# Patient Record
Sex: FEMALE | Race: WHITE | Employment: OTHER | URBAN - METROPOLITAN AREA
[De-identification: names, ages, dates, MRNs, and addresses within clinical notes are randomized per-mention and may not be internally consistent; named-entity substitution may affect disease eponyms.]

---

## 2017-02-14 ENCOUNTER — OFFICE VISIT (OUTPATIENT)
Dept: INTERNAL MEDICINE CLINIC | Facility: CLINIC | Age: 42
End: 2017-02-14

## 2017-02-14 VITALS — WEIGHT: 198 LBS | BODY MASS INDEX: 33.8 KG/M2 | HEIGHT: 64 IN

## 2017-02-14 DIAGNOSIS — M54.50 MIDLINE LOW BACK PAIN WITHOUT SCIATICA, UNSPECIFIED CHRONICITY: ICD-10-CM

## 2017-02-14 DIAGNOSIS — Z00.00 PHYSICAL EXAM, ANNUAL: Primary | ICD-10-CM

## 2017-02-14 DIAGNOSIS — R06.83 SNORING: ICD-10-CM

## 2017-02-14 PROCEDURE — 99396 PREV VISIT EST AGE 40-64: CPT | Performed by: INTERNAL MEDICINE

## 2017-02-14 RX ORDER — TRAMADOL HYDROCHLORIDE 50 MG/1
50 TABLET ORAL EVERY 6 HOURS PRN
Qty: 30 TABLET | Refills: 1 | Status: SHIPPED | OUTPATIENT
Start: 2017-02-14 | End: 2018-07-18

## 2017-02-14 NOTE — PROGRESS NOTES
HPI:   Herlinda Sage is a 42year old female who presents for a complete physical exam. Symptoms: periods are regular. Patient complains of back pain. There is no immunization history on file for this patient.   Wt Readings from Last 6 Encounters:  02 History          Comment  X 3, , ?, 2009    CHOLECYSTECTOMY      Comment laser cholecystectomy    TONSILLECTOMY         &     OTHER SURGICAL HISTORY      Comment gallbladder removed      Family History   Problem Re lesions  HEENT: atraumatic, normocephalic,ears and throat are clear  EYES:PERRLA, EOMI, normal optic disk,conjunctiva are clear  NECK: supple,no adenopathy,no bruits  CHEST: no chest tenderness  BREAST: no dominant or suspicious mass  LUNGS: clear to auscu office if the back pain persists or gets worse. The patient indicates understanding of these issues and agrees to the plan. The patient is asked to return for CPX in 1 year.

## 2017-02-25 ENCOUNTER — TELEPHONE (OUTPATIENT)
Dept: INTERNAL MEDICINE CLINIC | Facility: CLINIC | Age: 42
End: 2017-02-25

## 2017-02-25 DIAGNOSIS — M54.50 ACUTE MIDLINE LOW BACK PAIN WITHOUT SCIATICA: Primary | ICD-10-CM

## 2017-07-06 ENCOUNTER — OFFICE VISIT (OUTPATIENT)
Dept: NEUROLOGY | Facility: CLINIC | Age: 42
End: 2017-07-06

## 2017-07-06 VITALS
HEART RATE: 86 BPM | SYSTOLIC BLOOD PRESSURE: 110 MMHG | OXYGEN SATURATION: 97 % | BODY MASS INDEX: 30.73 KG/M2 | DIASTOLIC BLOOD PRESSURE: 62 MMHG | WEIGHT: 180 LBS | HEIGHT: 64 IN

## 2017-07-06 DIAGNOSIS — M54.16 LUMBAR RADICULOPATHY: ICD-10-CM

## 2017-07-06 DIAGNOSIS — M51.9 LUMBAR DISC DISEASE: ICD-10-CM

## 2017-07-06 DIAGNOSIS — G89.29 CHRONIC MIDLINE LOW BACK PAIN WITHOUT SCIATICA: Primary | ICD-10-CM

## 2017-07-06 DIAGNOSIS — M48.061 LUMBAR FORAMINAL STENOSIS: ICD-10-CM

## 2017-07-06 DIAGNOSIS — M54.50 CHRONIC MIDLINE LOW BACK PAIN WITHOUT SCIATICA: Primary | ICD-10-CM

## 2017-07-06 DIAGNOSIS — M43.10 RETROLISTHESIS: ICD-10-CM

## 2017-07-06 PROCEDURE — 99244 OFF/OP CNSLTJ NEW/EST MOD 40: CPT | Performed by: PHYSICAL MEDICINE & REHABILITATION

## 2017-07-06 NOTE — PROGRESS NOTES
Low Back Pain H & P    Chief Complaint:  Patient presents with:  Low Back Pain: Dr. Abigail Duong, pt c/o chronic lower back pain, states she has severe sharp pains that go into buttocks and happens with sudden movement and happens on and off, pain is constant Diagnosis Date   • Artificial insemination    • Cholecystitis 2011    Laser cholecystectomy 2011   • Decorative tattoo    • Degenerative lumbar disc 2005    Degenerative L2 disc, Therapy   • H/O pregnancy 1988, 1989, 2003, 2009    EAB x 2-1988, 1989;  Vasile History of tanning Yes    Breast feeding No    Reaction to local anesthetic No     Social History Narrative   None on file       Review of Systems  Constitutional:   Negative for chills, fatigue, fever, but positive night sweats.   No weight gain or weight degrees Painless     Lumbar Spine Palpation:    Spinous Processes: Tender at L5 and S1   Z-joints: Tender at  left L5-S1   SIJ: Non-tender for bilateral SIJ   Piriformis Muscle: Non-tender bilateral Piriformis muscles   Greater Trochanteric Bursa: Non-tend foraminal bulging disc    3. L5-S1 grade 1 Retrolisthesis with S1 lumbarized    4. L5-S1 left mild foraminal stenosis    5. left L5-S1 radiculopathy      Plan  She will start PT on her lumbar spine at [facility].     The patient does not need any injections at [facility]

## 2017-07-06 NOTE — PATIENT INSTRUCTIONS
As of October 6th 2014, the Drug Enforcement Agency [facility]) is reclassifying all hydrocodone combination medications from Schedule III to Schedule II. This includes medications such as Norco, Vicodin, Lortab, Zohydro, and Vicoprofen.     What this means for y chart.      Plan  She will start PT on her lumbar spine at [facility]. The patient does not need any injections at this time. She will take Advil 800 mg 2 times a day for the pain. She will call me after she has done one month of the PT.   If she is not d

## 2017-10-11 ENCOUNTER — PATIENT MESSAGE (OUTPATIENT)
Dept: INTERNAL MEDICINE CLINIC | Facility: CLINIC | Age: 42
End: 2017-10-11

## 2017-10-11 NOTE — TELEPHONE ENCOUNTER
From: Blaire Solorio  To: Tamie Bourgeois MD  Sent: 10/11/2017 8:52 AM CDT  Subject: Other    Good morning Dr. Khalida Chapin,    We received a letter that you are no longer accepting our insurance :( Is there anyone you can refer within the group that patel

## 2018-03-20 ENCOUNTER — NURSE TRIAGE (OUTPATIENT)
Dept: OTHER | Age: 43
End: 2018-03-20

## 2018-03-20 ENCOUNTER — OFFICE VISIT (OUTPATIENT)
Dept: INTERNAL MEDICINE CLINIC | Facility: CLINIC | Age: 43
End: 2018-03-20

## 2018-03-20 VITALS
HEART RATE: 76 BPM | RESPIRATION RATE: 16 BRPM | TEMPERATURE: 99 F | DIASTOLIC BLOOD PRESSURE: 74 MMHG | SYSTOLIC BLOOD PRESSURE: 134 MMHG

## 2018-03-20 DIAGNOSIS — E78.5 HYPERLIPIDEMIA, UNSPECIFIED HYPERLIPIDEMIA TYPE: ICD-10-CM

## 2018-03-20 DIAGNOSIS — R05.9 COUGH: Primary | ICD-10-CM

## 2018-03-20 PROCEDURE — 99212 OFFICE O/P EST SF 10 MIN: CPT | Performed by: INTERNAL MEDICINE

## 2018-03-20 PROCEDURE — 99214 OFFICE O/P EST MOD 30 MIN: CPT | Performed by: INTERNAL MEDICINE

## 2018-03-20 RX ORDER — METHYLPREDNISOLONE 4 MG/1
TABLET ORAL
Qty: 1 KIT | Refills: 0 | Status: SHIPPED | OUTPATIENT
Start: 2018-03-20 | End: 2018-07-18

## 2018-03-20 RX ORDER — AZITHROMYCIN 250 MG/1
TABLET, FILM COATED ORAL
Qty: 6 TABLET | Refills: 0 | Status: SHIPPED | OUTPATIENT
Start: 2018-03-20 | End: 2018-07-18

## 2018-03-20 NOTE — PROGRESS NOTES
Dillon Marks is a 36year old female. HPI:   1. Cough    Has a cough the last few days that is getting worse. Cough keeps her up at night. Low grade temp with chills but no headache.     2. Pure hypercholesterolemia    Patient has been following low chol exertion  CARDIOVASCULAR: denies chest pain on exertion  GI: denies abdominal pain and denies heartburn  NEURO: denies headaches    EXAM:   /74 (BP Location: Right arm, Patient Position: Sitting, Cuff Size: adult)   Pulse 76   Temp 99 °F (37.2 °C)

## 2018-03-20 NOTE — TELEPHONE ENCOUNTER
Action Requested: Summary for Provider     []  Critical Lab, Recommendations Needed  [] Need Additional Advice  [x]   FYI    []   Need Orders  [] Need Medications Sent to Pharmacy  []  Other     SUMMARY: Pt advised to go to IC or ER.  Advised to call insura

## 2018-03-22 ENCOUNTER — NURSE TRIAGE (OUTPATIENT)
Dept: OTHER | Age: 43
End: 2018-03-22

## 2018-03-22 RX ORDER — ALBUTEROL SULFATE 90 UG/1
2 AEROSOL, METERED RESPIRATORY (INHALATION) EVERY 6 HOURS PRN
Qty: 1 INHALER | Refills: 0 | Status: SHIPPED | OUTPATIENT
Start: 2018-03-22 | End: 2018-07-18

## 2018-03-22 NOTE — TELEPHONE ENCOUNTER
Action Requested: Summary for Provider     []  Critical Lab, Recommendations Needed  [x] Need Additional Advice  []   FYI    []   Need Orders  [x] Need Medications Sent to Pharmacy  []  Other     SUMMARY: Patient calling with complaint of worsened cough, c

## 2018-04-05 ENCOUNTER — TELEPHONE (OUTPATIENT)
Dept: INTERNAL MEDICINE CLINIC | Facility: CLINIC | Age: 43
End: 2018-04-05

## 2018-04-05 NOTE — TELEPHONE ENCOUNTER
Pt was scheduled on 5/7 for a px. Per pt she was coming in for a px and 2 mo chol check-up. She states DR advised her that he would do the f/u and px in that visit. Pt wants to confirm if that's ok? If so, next availability for a px would be in July. Pt wants to be seen sooner. pls advise.  Pt can only come in on Monday's after 2:30 pm and any other day after 4 pm. Thank you    Please reply to pool: ASHISH Vazquez

## 2018-04-19 RX ORDER — ATORVASTATIN CALCIUM 40 MG/1
20 TABLET, FILM COATED ORAL DAILY
Qty: 30 TABLET | Refills: 2 | Status: SHIPPED | OUTPATIENT
Start: 2018-04-19 | End: 2018-08-20

## 2018-07-18 ENCOUNTER — OFFICE VISIT (OUTPATIENT)
Dept: OBGYN CLINIC | Facility: CLINIC | Age: 43
End: 2018-07-18

## 2018-07-18 VITALS
BODY MASS INDEX: 34 KG/M2 | DIASTOLIC BLOOD PRESSURE: 86 MMHG | HEART RATE: 82 BPM | WEIGHT: 195.19 LBS | SYSTOLIC BLOOD PRESSURE: 122 MMHG

## 2018-07-18 DIAGNOSIS — Z01.419 ENCOUNTER FOR GYNECOLOGICAL EXAMINATION WITHOUT ABNORMAL FINDING: Primary | ICD-10-CM

## 2018-07-18 DIAGNOSIS — Z12.4 SCREENING FOR MALIGNANT NEOPLASM OF CERVIX: ICD-10-CM

## 2018-07-18 DIAGNOSIS — N89.8 VAGINAL DISCHARGE: ICD-10-CM

## 2018-07-18 DIAGNOSIS — Z12.31 SCREENING MAMMOGRAM, ENCOUNTER FOR: ICD-10-CM

## 2018-07-18 PROCEDURE — 99396 PREV VISIT EST AGE 40-64: CPT | Performed by: OBSTETRICS & GYNECOLOGY

## 2018-07-18 NOTE — PROGRESS NOTES
HPI:    Patient ID: Keely Ortiz is a 39year old female. HPI    with menses q 1-2 months prior to 2018. She has no menses since April. She is experiencing hot flashes. [de-identified] year old is in [employer].  Family history significant f guarding. Genitourinary: Uterus normal. No breast discharge. There is no rash or lesion on the right labia. There is no rash or lesion on the left labia. Uterus is not enlarged and not tender. Cervix exhibits no motion tenderness and no discharge.  Right

## 2018-07-19 LAB — HPV I/H RISK 1 DNA SPEC QL NAA+PROBE: NEGATIVE

## 2018-07-20 LAB
GENITAL VAGINOSIS SCREEN: NEGATIVE
TRICHOMONAS SCREEN: NEGATIVE

## 2018-08-17 ENCOUNTER — TELEPHONE (OUTPATIENT)
Dept: INTERNAL MEDICINE CLINIC | Facility: CLINIC | Age: 43
End: 2018-08-17

## 2018-08-17 NOTE — TELEPHONE ENCOUNTER
Received fax from [facility] stating pt does not want to cut Atorvastatin 40mg tabs in 1/2. She wants Atorvastatin 20mg tabs instead of 40mg tabs.     Current Outpatient Prescriptions:   •  ATORVASTATIN 40 MG Oral Tab, TAKE 0.5 TABLETS (20 MG TOTAL) BY MOUTH IKE

## 2018-08-20 ENCOUNTER — HOSPITAL ENCOUNTER (OUTPATIENT)
Dept: MAMMOGRAPHY | Age: 43
Discharge: HOME | End: 2018-08-20
Attending: OBSTETRICS & GYNECOLOGY
Payer: COMMERCIAL

## 2018-08-20 DIAGNOSIS — Z12.31 SCREENING MAMMOGRAM, ENCOUNTER FOR: ICD-10-CM

## 2018-08-20 PROCEDURE — 77067 SCR MAMMO BI INCL CAD: CPT | Performed by: OBSTETRICS & GYNECOLOGY

## 2018-08-20 RX ORDER — ATORVASTATIN CALCIUM 20 MG/1
20 TABLET, FILM COATED ORAL NIGHTLY
Qty: 90 TABLET | Refills: 0 | Status: SHIPPED | OUTPATIENT
Start: 2018-08-20 | End: 2020-01-31

## 2018-12-07 ENCOUNTER — OFFICE VISIT (OUTPATIENT)
Dept: INTERNAL MEDICINE CLINIC | Facility: CLINIC | Age: 43
End: 2018-12-07

## 2018-12-07 VITALS
HEIGHT: 64 IN | SYSTOLIC BLOOD PRESSURE: 127 MMHG | HEART RATE: 100 BPM | WEIGHT: 193 LBS | TEMPERATURE: 101 F | DIASTOLIC BLOOD PRESSURE: 87 MMHG | RESPIRATION RATE: 16 BRPM | BODY MASS INDEX: 32.95 KG/M2

## 2018-12-07 DIAGNOSIS — J06.9 UPPER RESPIRATORY INFECTION, ACUTE: Primary | ICD-10-CM

## 2018-12-07 DIAGNOSIS — E78.00 PURE HYPERCHOLESTEROLEMIA: ICD-10-CM

## 2018-12-07 PROCEDURE — 99214 OFFICE O/P EST MOD 30 MIN: CPT | Performed by: INTERNAL MEDICINE

## 2018-12-07 PROCEDURE — 99212 OFFICE O/P EST SF 10 MIN: CPT | Performed by: INTERNAL MEDICINE

## 2018-12-07 RX ORDER — AMOXICILLIN 400 MG/5ML
400 POWDER, FOR SUSPENSION ORAL 2 TIMES DAILY
Qty: 100 ML | Refills: 0 | Status: SHIPPED | OUTPATIENT
Start: 2018-12-07 | End: 2018-12-17

## 2018-12-12 NOTE — PROGRESS NOTES
Chana Dubon is a 39year old female. HPI:   1. Upper Respirory Infection    Has a cough the last few days that is getting worse. Cough keeps her up at night. Low grade temp with chills but no headache.  Had 2 children diagnosed with strept throat 10 day headaches    EXAM:   /87   Pulse 100   Temp (!) 100.9 °F (38.3 °C)   Resp 16   Ht 5' 4\" (1.626 m)   Wt 193 lb (87.5 kg)   BMI 33.13 kg/m²   GENERAL: well developed, obese, in no apparent distress  SKIN: no rashes,no suspicious lesions  HEENT: atraum

## 2019-03-06 ENCOUNTER — LAB ENCOUNTER (OUTPATIENT)
Dept: LAB | Facility: HOSPITAL | Age: 44
End: 2019-03-06
Attending: INTERNAL MEDICINE
Payer: COMMERCIAL

## 2019-03-06 ENCOUNTER — OFFICE VISIT (OUTPATIENT)
Dept: INTERNAL MEDICINE CLINIC | Facility: CLINIC | Age: 44
End: 2019-03-06

## 2019-03-06 VITALS
BODY MASS INDEX: 33 KG/M2 | RESPIRATION RATE: 16 BRPM | HEART RATE: 100 BPM | DIASTOLIC BLOOD PRESSURE: 85 MMHG | WEIGHT: 195 LBS | SYSTOLIC BLOOD PRESSURE: 135 MMHG

## 2019-03-06 DIAGNOSIS — R10.30 LOWER ABDOMINAL PAIN: Primary | ICD-10-CM

## 2019-03-06 DIAGNOSIS — R10.30 LOWER ABDOMINAL PAIN: ICD-10-CM

## 2019-03-06 LAB
ALBUMIN SERPL-MCNC: 3.8 G/DL (ref 3.4–5)
ALBUMIN/GLOB SERPL: 1.1 {RATIO} (ref 1–2)
ALP LIVER SERPL-CCNC: 60 U/L (ref 37–98)
ALT SERPL-CCNC: 34 U/L (ref 13–56)
ANION GAP SERPL CALC-SCNC: 6 MMOL/L (ref 0–18)
AST SERPL-CCNC: 19 U/L (ref 15–37)
BASOPHILS # BLD AUTO: 0.05 X10(3) UL (ref 0–0.2)
BASOPHILS NFR BLD AUTO: 0.6 %
BILIRUB SERPL-MCNC: 0.6 MG/DL (ref 0.1–2)
BILIRUB UR QL: NEGATIVE
BUN BLD-MCNC: 11 MG/DL (ref 7–18)
BUN/CREAT SERPL: 17.5 (ref 10–20)
CALCIUM BLD-MCNC: 8.5 MG/DL (ref 8.5–10.1)
CHLORIDE SERPL-SCNC: 108 MMOL/L (ref 98–107)
CLARITY UR: CLEAR
CO2 SERPL-SCNC: 26 MMOL/L (ref 21–32)
COLOR UR: YELLOW
CREAT BLD-MCNC: 0.63 MG/DL (ref 0.55–1.02)
DEPRECATED RDW RBC AUTO: 40 FL (ref 35.1–46.3)
EOSINOPHIL # BLD AUTO: 0.12 X10(3) UL (ref 0–0.7)
EOSINOPHIL NFR BLD AUTO: 1.5 %
ERYTHROCYTE [DISTWIDTH] IN BLOOD BY AUTOMATED COUNT: 12.9 % (ref 11–15)
GLOBULIN PLAS-MCNC: 3.6 G/DL (ref 2.8–4.4)
GLUCOSE BLD-MCNC: 111 MG/DL (ref 70–99)
GLUCOSE UR-MCNC: NEGATIVE MG/DL
HCT VFR BLD AUTO: 41.8 % (ref 35–48)
HGB BLD-MCNC: 13.3 G/DL (ref 12–16)
HGB UR QL STRIP.AUTO: NEGATIVE
IMM GRANULOCYTES # BLD AUTO: 0.01 X10(3) UL (ref 0–1)
IMM GRANULOCYTES NFR BLD: 0.1 %
KETONES UR-MCNC: NEGATIVE MG/DL
LEUKOCYTE ESTERASE UR QL STRIP.AUTO: NEGATIVE
LYMPHOCYTES # BLD AUTO: 3.48 X10(3) UL (ref 1–4)
LYMPHOCYTES NFR BLD AUTO: 42.7 %
M PROTEIN MFR SERPL ELPH: 7.4 G/DL (ref 6.4–8.2)
MCH RBC QN AUTO: 27.4 PG (ref 26–34)
MCHC RBC AUTO-ENTMCNC: 31.8 G/DL (ref 31–37)
MCV RBC AUTO: 86.2 FL (ref 80–100)
MONOCYTES # BLD AUTO: 0.81 X10(3) UL (ref 0.1–1)
MONOCYTES NFR BLD AUTO: 9.9 %
NEUTROPHILS # BLD AUTO: 3.68 X10 (3) UL (ref 1.5–7.7)
NEUTROPHILS # BLD AUTO: 3.68 X10(3) UL (ref 1.5–7.7)
NEUTROPHILS NFR BLD AUTO: 45.2 %
NITRITE UR QL STRIP.AUTO: NEGATIVE
OSMOLALITY SERPL CALC.SUM OF ELEC: 290 MOSM/KG (ref 275–295)
PH UR: 6 [PH] (ref 5–8)
PLATELET # BLD AUTO: 352 10(3)UL (ref 150–450)
POTASSIUM SERPL-SCNC: 4.2 MMOL/L (ref 3.5–5.1)
PROT UR-MCNC: NEGATIVE MG/DL
RBC # BLD AUTO: 4.85 X10(6)UL (ref 3.8–5.3)
SODIUM SERPL-SCNC: 140 MMOL/L (ref 136–145)
SP GR UR STRIP: 1.01 (ref 1–1.03)
UROBILINOGEN UR STRIP-ACNC: <2
VIT C UR-MCNC: NEGATIVE MG/DL
WBC # BLD AUTO: 8.2 X10(3) UL (ref 4–11)

## 2019-03-06 PROCEDURE — 99212 OFFICE O/P EST SF 10 MIN: CPT | Performed by: INTERNAL MEDICINE

## 2019-03-06 PROCEDURE — 80053 COMPREHEN METABOLIC PANEL: CPT

## 2019-03-06 PROCEDURE — 85025 COMPLETE CBC W/AUTO DIFF WBC: CPT

## 2019-03-06 PROCEDURE — 36415 COLL VENOUS BLD VENIPUNCTURE: CPT

## 2019-03-06 PROCEDURE — 99214 OFFICE O/P EST MOD 30 MIN: CPT | Performed by: INTERNAL MEDICINE

## 2019-03-06 PROCEDURE — 81003 URINALYSIS AUTO W/O SCOPE: CPT | Performed by: INTERNAL MEDICINE

## 2019-03-07 ENCOUNTER — TELEPHONE (OUTPATIENT)
Dept: GASTROENTEROLOGY | Facility: CLINIC | Age: 44
End: 2019-03-07

## 2019-03-07 NOTE — TELEPHONE ENCOUNTER
Spoke to pt. Per pt seen by Dr. Marcio Hurtado yesterday for lower abd pain and rectal bleeding x 4days. States has not had any rectal bleeding today. Appt offered and scheduled for 3/21/19 at 1000 at [facility]. Pt agreed to appt scheduled.  Mychart mess

## 2019-03-07 NOTE — TELEPHONE ENCOUNTER
Pt states that she has been having lower abdominal pain and had some rectal bleeding that started on Monday morning. Pt saw Dr Carmel Rodriguez yesterday and was told to call to schedule w/ GI. Pt saw Dr. Devora Mayes in 2016.   She is requesting an appt sooner than fi

## 2019-03-14 NOTE — PROGRESS NOTES
Larry Das is a 39year old female. HPI:   1. Abdominal pain    Had sex on Sunday and has had some abdominal pain since then with some rectal blood coming out in rectal area. Comes with even urinating and not moving bowel.        Current Outpatient Med edema    ASSESSMENT AND PLAN:     1. Lower abdominal pain    Having some lower abdominal pain with some rectal blood seen. Not sure if related to having sex a few days ago. Appetite seems ok and having regular urination and BMs.  Will check blood work and U

## 2019-03-21 ENCOUNTER — OFFICE VISIT (OUTPATIENT)
Dept: GASTROENTEROLOGY | Facility: CLINIC | Age: 44
End: 2019-03-21

## 2019-03-21 ENCOUNTER — TELEPHONE (OUTPATIENT)
Dept: GASTROENTEROLOGY | Facility: CLINIC | Age: 44
End: 2019-03-21

## 2019-03-21 VITALS
DIASTOLIC BLOOD PRESSURE: 85 MMHG | HEIGHT: 64 IN | SYSTOLIC BLOOD PRESSURE: 122 MMHG | BODY MASS INDEX: 32.61 KG/M2 | WEIGHT: 191 LBS | HEART RATE: 78 BPM

## 2019-03-21 DIAGNOSIS — L29.0 ANAL PRURITUS: ICD-10-CM

## 2019-03-21 DIAGNOSIS — K62.5 RECTAL BLEEDING: Primary | ICD-10-CM

## 2019-03-21 DIAGNOSIS — R10.9 ABDOMINAL PAIN, UNSPECIFIED ABDOMINAL LOCATION: ICD-10-CM

## 2019-03-21 DIAGNOSIS — R10.30 LOWER ABDOMINAL PAIN: ICD-10-CM

## 2019-03-21 PROBLEM — R10.10 PAIN OF UPPER ABDOMEN: Status: RESOLVED | Noted: 2019-03-21 | Resolved: 2019-03-21

## 2019-03-21 PROBLEM — R10.10 PAIN OF UPPER ABDOMEN: Status: ACTIVE | Noted: 2019-03-21

## 2019-03-21 PROCEDURE — 99212 OFFICE O/P EST SF 10 MIN: CPT | Performed by: INTERNAL MEDICINE

## 2019-03-21 PROCEDURE — 99215 OFFICE O/P EST HI 40 MIN: CPT | Performed by: INTERNAL MEDICINE

## 2019-03-21 RX ORDER — POLYETHYLENE GLYCOL 3350, SODIUM CHLORIDE, SODIUM BICARBONATE, POTASSIUM CHLORIDE 420; 11.2; 5.72; 1.48 G/4L; G/4L; G/4L; G/4L
POWDER, FOR SOLUTION ORAL
Qty: 1 BOTTLE | Refills: 0 | Status: SHIPPED | OUTPATIENT
Start: 2019-03-21 | End: 2021-05-17

## 2019-03-21 NOTE — TELEPHONE ENCOUNTER
Scheduled for:  Colonoscopy 97529  Provider Name: Dr Cardenas Room  Date:   Mon 4/01/19  Location:  [facility]  Sedation:  MAC  Time:  1:45 pm, pt aware [facility] will call with arrival time Fri before procedure  Prep: split dose suprep/trilyte  Meds/Allergies Reconciled?:  la

## 2019-03-21 NOTE — PATIENT INSTRUCTIONS
1. Schedule colonoscopy with [facility] [Diagnosis: rectal bleeding, abdominal pain]    2.  bowel prep from pharmacy (split suprep vs trilyte)    3. Continue all medications for procedure    4. Read all bowel prep instructions carefully    5.  AVOID se

## 2019-03-21 NOTE — PROGRESS NOTES
[facility] Follow-up Visit    Jamia the last 2 weeks symptoms have been getting better and yesterday she says that she no longer has the symptoms. She still gets occasional burping and epigastric pressure that resolves with burping.   But overall she feels much better than she did when she h Grandmother         Cancer-lung, age 43 (cause of death)   • Diabetes Maternal Grandfather    • Other (No hyperlipidemia) Other       Social History: Social History    Tobacco Use      Smoking status: Former Smoker        Quit date: 4/7/1993        Years s guarding, non-distended, no abnormal bowel sounds noted, no masses are palpated  Skin- No jaundice  Ext: no cyanosis, clubbing or edema is evident.    Neuro- Alert and oriented x4, and patient is having movement of all 4 extremities     Labs/Imaging:     Pa Prescriptions     Signed Prescriptions Disp Refills   • Na Sulfate-K Sulfate-Mg Sulf (SUPREP BOWEL PREP KIT) 17.5-3.13-1.6 GM/177ML Oral Solution 1 Bottle 0     Sig: Take as directed   • PEG 3350-KCl-Na Bicarb-NaCl (TRILYTE) 420 g Oral Recon Soln 1 Bottle

## 2019-03-29 ENCOUNTER — TELEPHONE (OUTPATIENT)
Dept: GASTROENTEROLOGY | Facility: CLINIC | Age: 44
End: 2019-03-29

## 2019-03-29 NOTE — TELEPHONE ENCOUNTER
Cele, out pt surgery ctr calling regarding referral which list the wrong loc and open status. Pt is iliana for cln 4/1/19, pls call at:[phone],thanks.

## 2019-04-01 ENCOUNTER — LAB REQUISITION (OUTPATIENT)
Dept: LAB | Facility: HOSPITAL | Age: 44
End: 2019-04-01
Payer: COMMERCIAL

## 2019-04-01 DIAGNOSIS — Z01.89 ENCOUNTER FOR OTHER SPECIFIED SPECIAL EXAMINATIONS: ICD-10-CM

## 2019-04-01 PROCEDURE — 88305 TISSUE EXAM BY PATHOLOGIST: CPT | Performed by: INTERNAL MEDICINE

## 2019-04-11 ENCOUNTER — TELEPHONE (OUTPATIENT)
Dept: GASTROENTEROLOGY | Facility: CLINIC | Age: 44
End: 2019-04-11

## 2019-04-11 NOTE — TELEPHONE ENCOUNTER
Entered into Epic:Recall colon in 10 years per Dr. Wang Kelley. Last Colon done 4/1/19, next due 4/1/29. Snapshot updated.

## 2019-07-31 ENCOUNTER — HOSPITAL ENCOUNTER (OUTPATIENT)
Age: 44
Discharge: HOME | End: 2019-07-31
Attending: EMERGENCY MEDICINE
Payer: COMMERCIAL

## 2019-07-31 VITALS
OXYGEN SATURATION: 100 % | HEART RATE: 87 BPM | RESPIRATION RATE: 18 BRPM | BODY MASS INDEX: 30.12 KG/M2 | DIASTOLIC BLOOD PRESSURE: 88 MMHG | SYSTOLIC BLOOD PRESSURE: 130 MMHG | HEIGHT: 63 IN | TEMPERATURE: 99 F | WEIGHT: 170 LBS

## 2019-07-31 DIAGNOSIS — J02.9 ACUTE VIRAL PHARYNGITIS: Primary | ICD-10-CM

## 2019-07-31 LAB — S PYO AG THROAT QL: NEGATIVE

## 2019-07-31 PROCEDURE — 99203 OFFICE O/P NEW LOW 30 MIN: CPT

## 2019-07-31 PROCEDURE — 87430 STREP A AG IA: CPT

## 2019-07-31 PROCEDURE — 99212 OFFICE O/P EST SF 10 MIN: CPT

## 2019-07-31 RX ORDER — NAPROXEN 500 MG/1
500 TABLET ORAL 2 TIMES DAILY WITH MEALS
Qty: 20 TABLET | Refills: 0 | Status: SHIPPED | OUTPATIENT
Start: 2019-07-31 | End: 2019-08-07

## 2019-07-31 RX ORDER — FLUTICASONE PROPIONATE 50 MCG
2 SPRAY, SUSPENSION (ML) NASAL DAILY
Qty: 16 G | Refills: 0 | Status: SHIPPED | OUTPATIENT
Start: 2019-07-31 | End: 2019-08-30

## 2019-07-31 NOTE — ED INITIAL ASSESSMENT (HPI)
Patient reports she woke up this morning and her neck and throat was sore. Patient reports she is achy and it's hard to swallow.

## 2019-07-31 NOTE — ED PROVIDER NOTES
Patient Seen in: [facility] In [place]    History   Patient presents with:  Sore Throat    Stated Complaint: sore throat    HPI    Patient here complaining of sore throat for 1 days.   Notes pain is described as SORE and rates it as Relation Age of Onset   • Diabetes Father 72   • Obesity Father    • Hypertension Mother    • High Cholesterol Mother    • Diabetes Mother         Bord DM   • Cancer Mother 54        Cancer-breast, Mother, Emily Spann, diagnosed 2010   • Heart Disorder Mo Regular without murmur  EXTREMITIES: no cyanosis, clubbing or edema  GI: soft, non-tender, normal bowel sounds    DDX: pharyngitis viral vs. Strep vs. laryngitis    ED Course     Labs Reviewed   EMH POCT RAPID STREP - Normal       MDM         Disposition a

## 2019-11-07 ENCOUNTER — TELEPHONE (OUTPATIENT)
Dept: OBGYN CLINIC | Facility: CLINIC | Age: 44
End: 2019-11-07

## 2019-11-07 NOTE — TELEPHONE ENCOUNTER
PER PATIENT STATE SHE HAS A BUMP IN HER VAGINAL AREA / PAINFUL / PT WANT TO GET IN SOONER THE THE 18TH OF NOV TO SEE A DR. / PLEASE ADVISE

## 2019-11-07 NOTE — TELEPHONE ENCOUNTER
Pt reports lump on labia size of a pea. Pt states the lump is tender and \"sensitive and feels raw\". Pt rates pain 4-5/10. Pt requesting appt. offered pt first available appt for today with MAF, pt declined due to schedule.  Assisted pt with scheduling bo

## 2019-11-11 ENCOUNTER — OFFICE VISIT (OUTPATIENT)
Dept: OBGYN CLINIC | Facility: CLINIC | Age: 44
End: 2019-11-11

## 2019-11-11 VITALS
HEART RATE: 80 BPM | BODY MASS INDEX: 36 KG/M2 | WEIGHT: 200.63 LBS | DIASTOLIC BLOOD PRESSURE: 82 MMHG | SYSTOLIC BLOOD PRESSURE: 123 MMHG

## 2019-11-11 DIAGNOSIS — N89.8 VAGINAL IRRITATION: ICD-10-CM

## 2019-11-11 DIAGNOSIS — N90.7 LABIAL CYST: Primary | ICD-10-CM

## 2019-11-11 PROCEDURE — 99213 OFFICE O/P EST LOW 20 MIN: CPT | Performed by: CLINICAL NURSE SPECIALIST

## 2019-11-11 NOTE — PROGRESS NOTES
Xiang Yoo is a 38year old female [id number] Patient's last menstrual period was 10/14/2019. Patient presents with:  Gyn Problem: vaginal lump was painful on Friday no pain today per pt  Here with .  Noticed a bump on her inner vaginal area on [employer] service: Not on file        Active member of club or organization: Not on file        Attends meetings of clubs or organizations: Not on file        Relationship status: Not on file      Intimate partner violence:        Fear of current or ex par incontinence, abnormal vaginal discharge, + vaginal itching, + resolving vaginal bump        PHYSICAL EXAM:   Constitutional: well developed, well nourished  Head/Face: normocephalic  Psychiatric:  Oriented to time, place, person and situation.  Appropriate

## 2020-01-31 ENCOUNTER — OFFICE VISIT (OUTPATIENT)
Dept: INTERNAL MEDICINE CLINIC | Facility: CLINIC | Age: 45
End: 2020-01-31

## 2020-01-31 ENCOUNTER — LAB ENCOUNTER (OUTPATIENT)
Dept: LAB | Age: 45
End: 2020-01-31
Attending: INTERNAL MEDICINE
Payer: COMMERCIAL

## 2020-01-31 VITALS
SYSTOLIC BLOOD PRESSURE: 128 MMHG | BODY MASS INDEX: 35.61 KG/M2 | OXYGEN SATURATION: 98 % | HEART RATE: 83 BPM | TEMPERATURE: 98 F | WEIGHT: 201 LBS | HEIGHT: 63 IN | DIASTOLIC BLOOD PRESSURE: 84 MMHG | RESPIRATION RATE: 18 BRPM

## 2020-01-31 DIAGNOSIS — Z00.00 PHYSICAL EXAM, ANNUAL: ICD-10-CM

## 2020-01-31 DIAGNOSIS — Z00.00 PHYSICAL EXAM, ANNUAL: Primary | ICD-10-CM

## 2020-01-31 DIAGNOSIS — E78.2 MIXED HYPERLIPIDEMIA: ICD-10-CM

## 2020-01-31 LAB
25(OH)D3 SERPL-MCNC: 25.4 NG/ML (ref 30–100)
ALBUMIN SERPL-MCNC: 4.1 G/DL (ref 3.4–5)
ALBUMIN/GLOB SERPL: 1.1 {RATIO} (ref 1–2)
ALP LIVER SERPL-CCNC: 64 U/L (ref 37–98)
ALT SERPL-CCNC: 58 U/L (ref 13–56)
ANION GAP SERPL CALC-SCNC: 1 MMOL/L (ref 0–18)
AST SERPL-CCNC: 37 U/L (ref 15–37)
BASOPHILS # BLD AUTO: 0.05 X10(3) UL (ref 0–0.2)
BASOPHILS NFR BLD AUTO: 0.7 %
BILIRUB SERPL-MCNC: 0.9 MG/DL (ref 0.1–2)
BILIRUB UR QL: NEGATIVE
BUN BLD-MCNC: 13 MG/DL (ref 7–18)
BUN/CREAT SERPL: 15.5 (ref 10–20)
CALCIUM BLD-MCNC: 9.1 MG/DL (ref 8.5–10.1)
CHLORIDE SERPL-SCNC: 109 MMOL/L (ref 98–112)
CHOLEST SMN-MCNC: 276 MG/DL (ref ?–200)
CO2 SERPL-SCNC: 30 MMOL/L (ref 21–32)
COLOR UR: YELLOW
CREAT BLD-MCNC: 0.84 MG/DL (ref 0.55–1.02)
DEPRECATED RDW RBC AUTO: 40.8 FL (ref 35.1–46.3)
EOSINOPHIL # BLD AUTO: 0.16 X10(3) UL (ref 0–0.7)
EOSINOPHIL NFR BLD AUTO: 2.1 %
ERYTHROCYTE [DISTWIDTH] IN BLOOD BY AUTOMATED COUNT: 13.1 % (ref 11–15)
GLOBULIN PLAS-MCNC: 3.8 G/DL (ref 2.8–4.4)
GLUCOSE BLD-MCNC: 95 MG/DL (ref 70–99)
GLUCOSE UR-MCNC: NEGATIVE MG/DL
HCT VFR BLD AUTO: 45.8 % (ref 35–48)
HDLC SERPL-MCNC: 52 MG/DL (ref 40–59)
HGB BLD-MCNC: 14.8 G/DL (ref 12–16)
HGB UR QL STRIP.AUTO: NEGATIVE
IMM GRANULOCYTES # BLD AUTO: 0.01 X10(3) UL (ref 0–1)
IMM GRANULOCYTES NFR BLD: 0.1 %
KETONES UR-MCNC: NEGATIVE MG/DL
LDLC SERPL CALC-MCNC: 195 MG/DL (ref ?–100)
LEUKOCYTE ESTERASE UR QL STRIP.AUTO: NEGATIVE
LYMPHOCYTES # BLD AUTO: 2.88 X10(3) UL (ref 1–4)
LYMPHOCYTES NFR BLD AUTO: 37.8 %
M PROTEIN MFR SERPL ELPH: 7.9 G/DL (ref 6.4–8.2)
MCH RBC QN AUTO: 27.6 PG (ref 26–34)
MCHC RBC AUTO-ENTMCNC: 32.3 G/DL (ref 31–37)
MCV RBC AUTO: 85.4 FL (ref 80–100)
MONOCYTES # BLD AUTO: 0.7 X10(3) UL (ref 0.1–1)
MONOCYTES NFR BLD AUTO: 9.2 %
NEUTROPHILS # BLD AUTO: 3.81 X10 (3) UL (ref 1.5–7.7)
NEUTROPHILS # BLD AUTO: 3.81 X10(3) UL (ref 1.5–7.7)
NEUTROPHILS NFR BLD AUTO: 50.1 %
NITRITE UR QL STRIP.AUTO: NEGATIVE
NONHDLC SERPL-MCNC: 224 MG/DL (ref ?–130)
OSMOLALITY SERPL CALC.SUM OF ELEC: 290 MOSM/KG (ref 275–295)
PATIENT FASTING Y/N/NP: YES
PATIENT FASTING Y/N/NP: YES
PH UR: 5 [PH] (ref 5–8)
PLATELET # BLD AUTO: 365 10(3)UL (ref 150–450)
POTASSIUM SERPL-SCNC: 4.5 MMOL/L (ref 3.5–5.1)
PROT UR-MCNC: NEGATIVE MG/DL
RBC # BLD AUTO: 5.36 X10(6)UL (ref 3.8–5.3)
RBC #/AREA URNS AUTO: 1 /HPF
SODIUM SERPL-SCNC: 140 MMOL/L (ref 136–145)
SP GR UR STRIP: 1.02 (ref 1–1.03)
TRIGL SERPL-MCNC: 144 MG/DL (ref 30–149)
TSI SER-ACNC: 2.23 MIU/ML (ref 0.36–3.74)
UROBILINOGEN UR STRIP-ACNC: <2
VLDLC SERPL CALC-MCNC: 29 MG/DL (ref 0–30)
WBC # BLD AUTO: 7.6 X10(3) UL (ref 4–11)
WBC #/AREA URNS AUTO: 2 /HPF

## 2020-01-31 PROCEDURE — G0438 PPPS, INITIAL VISIT: HCPCS | Performed by: INTERNAL MEDICINE

## 2020-01-31 PROCEDURE — 36415 COLL VENOUS BLD VENIPUNCTURE: CPT

## 2020-01-31 PROCEDURE — 84443 ASSAY THYROID STIM HORMONE: CPT

## 2020-01-31 PROCEDURE — 99396 PREV VISIT EST AGE 40-64: CPT | Performed by: INTERNAL MEDICINE

## 2020-01-31 PROCEDURE — 85025 COMPLETE CBC W/AUTO DIFF WBC: CPT

## 2020-01-31 PROCEDURE — 82306 VITAMIN D 25 HYDROXY: CPT

## 2020-01-31 PROCEDURE — 80061 LIPID PANEL: CPT

## 2020-01-31 PROCEDURE — 80053 COMPREHEN METABOLIC PANEL: CPT

## 2020-01-31 PROCEDURE — 81003 URINALYSIS AUTO W/O SCOPE: CPT

## 2020-01-31 NOTE — PROGRESS NOTES
REASON FOR VISIT:    Florencia Haq is a 38year old female who presents for an [address]. Patient Active Problem List:     Polycystic ovaries     Skin lesion of back     Obesity (BMI 30.0-34. 9)     Family history of breast cancer in moth 1965 No results found for: HCVAB   Tuberculosis Screen If high risk No components found for: PPDINDURAT       SPECIFIC DISEASE MONITORING Internal Lab or Procedure   No disease specific diagnoses    ALLERGIES:     Latex                       Comment:Other Hypertension Mother    • High Cholesterol Mother    • Diabetes Mother         Bord DM   • Cancer Mother 54        Cancer-breast, Mother, Nora Shepherd, diagnosed 2010   • Heart Disorder Mother         coronary stent   • Breast Cancer Mother 64   • Alcohol a clear  EYES:PERRLA, EOMI, conjunctiva are clear.  normal optic disc  NECK: supple, no adenopathy, no bruits  CHEST: no chest tenderness  BREAST: deferred   LUNGS: clear to auscultation  CARDIO: RRR without murmur  GI: good BS's, no masses, HSM or tenderness patient. HPV: There are no preventive care reminders to display for this patient. Tdap: There are no preventive care reminders to display for this patient.   Shingles:     Influenza Annually   Pneumococcal if high risk   Td/Tdap once then every 10 years

## 2020-02-05 ENCOUNTER — TELEPHONE (OUTPATIENT)
Dept: INTERNAL MEDICINE CLINIC | Facility: CLINIC | Age: 45
End: 2020-02-05

## 2020-02-05 DIAGNOSIS — E55.9 VITAMIN D DEFICIENCY: ICD-10-CM

## 2020-02-05 DIAGNOSIS — E78.2 MIXED HYPERLIPIDEMIA: Primary | ICD-10-CM

## 2020-02-05 RX ORDER — ROSUVASTATIN CALCIUM 20 MG/1
20 TABLET, COATED ORAL NIGHTLY
Qty: 90 TABLET | Refills: 1 | Status: SHIPPED | OUTPATIENT
Start: 2020-02-05 | End: 2020-08-05

## 2020-02-05 RX ORDER — ERGOCALCIFEROL 1.25 MG/1
CAPSULE ORAL
Qty: 12 CAPSULE | Refills: 0 | Status: SHIPPED | OUTPATIENT
Start: 2020-02-05 | End: 2021-05-17

## 2020-02-05 NOTE — TELEPHONE ENCOUNTER
Patient is calling back and was advised of notes below however she asked if the Crestor was sent to the pharmacy. After checking, it was not. Please advise what dose. Per your office visit note copied/ pasted here: Currently NOT taking atorvastatin.  Will s

## 2020-02-05 NOTE — TELEPHONE ENCOUNTER
Patient confirms has reviewed result notes, wanting to confirm if will be started on cholesterol med, had previously discussed starting Crestor, please advise. Order for Vit D weekly dose and labs already in system.     Viewed by Dwayne Jasmine on 2/5/2020

## 2020-02-05 NOTE — TELEPHONE ENCOUNTER
Advised patient of Dr Jaime Lunch note. Patient verbalized understanding and had no further questions.

## 2020-03-20 ENCOUNTER — TELEPHONE (OUTPATIENT)
Dept: OBGYN CLINIC | Facility: CLINIC | Age: 45
End: 2020-03-20

## 2020-04-20 ENCOUNTER — TELEPHONE (OUTPATIENT)
Dept: INTERNAL MEDICINE CLINIC | Facility: CLINIC | Age: 45
End: 2020-04-20

## 2020-04-20 NOTE — TELEPHONE ENCOUNTER
Patient states she just finished taking her vitamin D as well as cholesterol medication. Patient wants to know next plan of action since she finished taking medications. Please advise.

## 2020-04-20 NOTE — TELEPHONE ENCOUNTER
Spoke with the patient and informed her that there are pending lab orders for her to have her vitamin D level and lipids drawn and the expected date was for 5/5/20.  Patient is requesting to know if Dr. Casimiro Stapleton recommends she wait until the pandemic subsi

## 2020-04-21 NOTE — TELEPHONE ENCOUNTER
Dr. Juan R Chapman please advise if patient should take OTC vitamin D, if so what strength? Or will you be sending rx to pharmacy?

## 2020-04-21 NOTE — TELEPHONE ENCOUNTER
Definitely wait until this pandemic is over to draw those labs there is nothing urgent there just continue to take the vitamin D.

## 2020-04-21 NOTE — TELEPHONE ENCOUNTER
Spoke with patient and informed of instructions as noted below, verbalized understanding. No further action required.

## 2020-04-24 ENCOUNTER — NURSE TRIAGE (OUTPATIENT)
Dept: DERMATOLOGY CLINIC | Facility: CLINIC | Age: 45
End: 2020-04-24

## 2020-04-24 ENCOUNTER — TELEPHONE (OUTPATIENT)
Dept: INTERNAL MEDICINE CLINIC | Facility: CLINIC | Age: 45
End: 2020-04-24

## 2020-04-24 DIAGNOSIS — H57.11 ACUTE RIGHT EYE PAIN: Primary | ICD-10-CM

## 2020-04-24 NOTE — TELEPHONE ENCOUNTER
Please reply to pool: EM RN TRIAGE    Action Requested: Summary for Provider     []  Critical Lab, Recommendations Needed  [] Need Additional Advice  [x]   FYI    []   Need Orders  [] Need Medications Sent to Pharmacy  []  Other     SUMMARY: Warm transfe

## 2020-04-24 NOTE — TELEPHONE ENCOUNTER
Please reply to pool: EM RN TRIAGE    Action Requested: Summary for Provider     []  Critical Lab, Recommendations Needed  [] Need Additional Advice  [x]   FYI    []   Need Orders  [] Need Medications Sent to Pharmacy  []  Other     SUMMARY: Warm tra

## 2020-04-24 NOTE — TELEPHONE ENCOUNTER
Pt called; has appt with ophthalmology at 12:30 pm today (see other encounter from today).  Needs referral; referral pended for review/approval

## 2020-04-24 NOTE — TELEPHONE ENCOUNTER
Patient complains of tearing and some soreness in her right eye since taking her CL out on 4/23/20.   Appointment scheduled with Dr. Shanell Boyle today at 12:30pm

## 2020-06-09 ENCOUNTER — APPOINTMENT (OUTPATIENT)
Dept: LAB | Age: 45
End: 2020-06-09
Attending: INTERNAL MEDICINE
Payer: COMMERCIAL

## 2020-06-09 ENCOUNTER — OFFICE VISIT (OUTPATIENT)
Dept: OBGYN CLINIC | Facility: CLINIC | Age: 45
End: 2020-06-09

## 2020-06-09 VITALS
WEIGHT: 195 LBS | HEART RATE: 80 BPM | BODY MASS INDEX: 35 KG/M2 | DIASTOLIC BLOOD PRESSURE: 75 MMHG | SYSTOLIC BLOOD PRESSURE: 107 MMHG

## 2020-06-09 DIAGNOSIS — Z12.31 SCREENING MAMMOGRAM, ENCOUNTER FOR: ICD-10-CM

## 2020-06-09 DIAGNOSIS — E55.9 VITAMIN D DEFICIENCY: ICD-10-CM

## 2020-06-09 DIAGNOSIS — E78.2 MIXED HYPERLIPIDEMIA: ICD-10-CM

## 2020-06-09 DIAGNOSIS — Z01.419 ENCOUNTER FOR GYNECOLOGICAL EXAMINATION WITHOUT ABNORMAL FINDING: Primary | ICD-10-CM

## 2020-06-09 PROCEDURE — 36415 COLL VENOUS BLD VENIPUNCTURE: CPT

## 2020-06-09 PROCEDURE — 99396 PREV VISIT EST AGE 40-64: CPT | Performed by: OBSTETRICS & GYNECOLOGY

## 2020-06-09 PROCEDURE — 80061 LIPID PANEL: CPT

## 2020-06-09 PROCEDURE — 82306 VITAMIN D 25 HYDROXY: CPT

## 2020-06-09 RX ORDER — BIOTIN 1 MG
2 TABLET ORAL DAILY
Status: ON HOLD | COMMUNITY
End: 2021-12-10

## 2020-06-16 NOTE — PROGRESS NOTES
HPI:    Patient ID: Larry Das is a 38year old female. HPI  G4 [id number] with menses q 28d / 4d / normal and Vasectomy for [facility]. Francie Miller will be a senior and Rahat Roper is 6. Both are well.  No new family medical issues but she was placed on a statin and rash   PHYSICAL EXAM:   Physical Exam   Constitutional: She appears well-developed and well-nourished. No distress. Neck: Neck supple. No thyromegaly present. Cardiovascular: Normal rate, regular rhythm and normal heart sounds. No murmur heard.   Pulm

## 2020-08-05 RX ORDER — ROSUVASTATIN CALCIUM 20 MG/1
TABLET, COATED ORAL
Qty: 90 TABLET | Refills: 1 | Status: SHIPPED | OUTPATIENT
Start: 2020-08-05 | End: 2021-03-22

## 2021-03-22 RX ORDER — ROSUVASTATIN CALCIUM 20 MG/1
TABLET, COATED ORAL
Qty: 90 TABLET | Refills: 1 | Status: SHIPPED | OUTPATIENT
Start: 2021-03-22 | End: 2021-11-05

## 2021-05-17 ENCOUNTER — OFFICE VISIT (OUTPATIENT)
Dept: INTERNAL MEDICINE CLINIC | Facility: CLINIC | Age: 46
End: 2021-05-17

## 2021-05-17 VITALS
WEIGHT: 207 LBS | DIASTOLIC BLOOD PRESSURE: 78 MMHG | BODY MASS INDEX: 35.34 KG/M2 | HEART RATE: 83 BPM | SYSTOLIC BLOOD PRESSURE: 121 MMHG | HEIGHT: 64 IN | RESPIRATION RATE: 16 BRPM

## 2021-05-17 DIAGNOSIS — Z00.00 PHYSICAL EXAM, ANNUAL: Primary | ICD-10-CM

## 2021-05-17 PROCEDURE — 3008F BODY MASS INDEX DOCD: CPT | Performed by: INTERNAL MEDICINE

## 2021-05-17 PROCEDURE — G0438 PPPS, INITIAL VISIT: HCPCS | Performed by: INTERNAL MEDICINE

## 2021-05-17 PROCEDURE — 99396 PREV VISIT EST AGE 40-64: CPT | Performed by: INTERNAL MEDICINE

## 2021-05-17 PROCEDURE — 3078F DIAST BP <80 MM HG: CPT | Performed by: INTERNAL MEDICINE

## 2021-05-17 PROCEDURE — 3074F SYST BP LT 130 MM HG: CPT | Performed by: INTERNAL MEDICINE

## 2021-05-17 RX ORDER — BIOTIN 1 MG
1 TABLET ORAL DAILY
Status: ON HOLD | COMMUNITY
End: 2021-12-10 | Stop reason: ALTCHOICE

## 2021-05-17 NOTE — PROGRESS NOTES
REASON FOR VISIT:    Stella Collet is a 54year old female who presents for an [address]. Patient Active Problem List:     Mixed hyperlipidemia     Polycystic ovaries     Skin lesion of back     Obesity (BMI 30.0-34. 9)     Family history Syphilis Screening Screen if pregnant or high risk No results found for: RPR   Hepatitis C Screening Screen pts at high risk plus screen one time for adults born [address]  No results found for: HCVAB   Tuberculosis Screen If high risk No components fou Father 72   • Obesity Father    • Hypertension Mother    • High Cholesterol Mother    • Diabetes Mother         Bord DM   • Cancer Mother 54        Cancer-breast, Mother, Sagrario Wills, diagnosed 2010   • Heart Disorder Mother         coronary stent   • Alc supple, no adenopathy, no bruits  CHEST: no chest tenderness  BREAST: deferred   LUNGS: clear to auscultation  CARDIO: RRR without murmur  GI: good BS's, no masses, HSM or tenderness  :deferred  RECTAL: deferred  MUSCULOSKELETAL: back is not tender, FROM Varicella 2 doses if not immune   MMR 1-2 doses if born after 1957 and not immune

## 2021-06-07 ENCOUNTER — PATIENT MESSAGE (OUTPATIENT)
Dept: INTERNAL MEDICINE CLINIC | Facility: CLINIC | Age: 46
End: 2021-06-07

## 2021-06-07 DIAGNOSIS — R14.0 ABDOMINAL BLOATING: ICD-10-CM

## 2021-06-07 DIAGNOSIS — N95.1 MENOPAUSAL AND FEMALE CLIMACTERIC STATES: Primary | ICD-10-CM

## 2021-06-07 DIAGNOSIS — N91.2 AMENORRHEA: ICD-10-CM

## 2021-06-09 NOTE — TELEPHONE ENCOUNTER
LMP in February and just had scant spot just today. Regular menses prior to February. Feeling bloating. I recommended FSH/ E2 and US. Orders sent.

## 2021-11-05 RX ORDER — ROSUVASTATIN CALCIUM 20 MG/1
20 TABLET, COATED ORAL NIGHTLY
Qty: 30 TABLET | Refills: 0 | Status: SHIPPED | OUTPATIENT
Start: 2021-11-05 | End: 2022-01-04

## 2021-11-05 NOTE — TELEPHONE ENCOUNTER
Received fax for a RX refill or new RX        ROSUVASTATIN CALCIUM 20 MG Oral Tab, TAKE 1 TABLET BY MOUTH EVERY DAY AT NIGHT, Disp: 90 tablet, Rfl: 1

## 2021-11-05 NOTE — TELEPHONE ENCOUNTER
Spoke, with the patient and advised her of the message below. Pt did make an appt to see Dr. Marybeth Altamirano on 11-18-21 to establish care/refills. Abel Egnel

## 2021-12-10 ENCOUNTER — NURSE TRIAGE (OUTPATIENT)
Dept: INTERNAL MEDICINE CLINIC | Facility: CLINIC | Age: 46
End: 2021-12-10

## 2021-12-10 ENCOUNTER — APPOINTMENT (OUTPATIENT)
Dept: CT IMAGING | Facility: HOSPITAL | Age: 46
DRG: 842 | End: 2021-12-10
Attending: EMERGENCY MEDICINE
Payer: COMMERCIAL

## 2021-12-10 ENCOUNTER — HOSPITAL ENCOUNTER (INPATIENT)
Facility: HOSPITAL | Age: 46
LOS: 4 days | Discharge: HOME | DRG: 842 | End: 2021-12-16
Attending: EMERGENCY MEDICINE | Admitting: HOSPITALIST
Payer: COMMERCIAL

## 2021-12-10 DIAGNOSIS — R10.9 ABDOMINAL PAIN, ACUTE: ICD-10-CM

## 2021-12-10 DIAGNOSIS — K63.89 MESENTERIC MASS: Primary | ICD-10-CM

## 2021-12-10 PROCEDURE — 99223 1ST HOSP IP/OBS HIGH 75: CPT | Performed by: INTERNAL MEDICINE

## 2021-12-10 PROCEDURE — 74177 CT ABD & PELVIS W/CONTRAST: CPT | Performed by: EMERGENCY MEDICINE

## 2021-12-10 PROCEDURE — 99222 1ST HOSP IP/OBS MODERATE 55: CPT | Performed by: HOSPITALIST

## 2021-12-10 RX ORDER — PROCHLORPERAZINE EDISYLATE 5 MG/ML
5 INJECTION INTRAMUSCULAR; INTRAVENOUS EVERY 8 HOURS PRN
Status: DISCONTINUED | OUTPATIENT
Start: 2021-12-10 | End: 2021-12-16

## 2021-12-10 RX ORDER — SODIUM CHLORIDE 9 MG/ML
INJECTION, SOLUTION INTRAVENOUS ONCE
Status: COMPLETED | OUTPATIENT
Start: 2021-12-10 | End: 2021-12-12

## 2021-12-10 RX ORDER — MORPHINE SULFATE 4 MG/ML
4 INJECTION, SOLUTION INTRAMUSCULAR; INTRAVENOUS ONCE
Status: COMPLETED | OUTPATIENT
Start: 2021-12-10 | End: 2021-12-10

## 2021-12-10 RX ORDER — SODIUM CHLORIDE 9 MG/ML
INJECTION, SOLUTION INTRAVENOUS CONTINUOUS
Status: DISCONTINUED | OUTPATIENT
Start: 2021-12-10 | End: 2021-12-15

## 2021-12-10 RX ORDER — ONDANSETRON 2 MG/ML
4 INJECTION INTRAMUSCULAR; INTRAVENOUS ONCE
Status: COMPLETED | OUTPATIENT
Start: 2021-12-10 | End: 2021-12-10

## 2021-12-10 RX ORDER — MORPHINE SULFATE 4 MG/ML
4 INJECTION, SOLUTION INTRAMUSCULAR; INTRAVENOUS EVERY 2 HOUR PRN
Status: DISCONTINUED | OUTPATIENT
Start: 2021-12-10 | End: 2021-12-11

## 2021-12-10 RX ORDER — MORPHINE SULFATE 2 MG/ML
1 INJECTION, SOLUTION INTRAMUSCULAR; INTRAVENOUS EVERY 2 HOUR PRN
Status: DISCONTINUED | OUTPATIENT
Start: 2021-12-10 | End: 2021-12-16

## 2021-12-10 RX ORDER — ACETAMINOPHEN 325 MG/1
650 TABLET ORAL EVERY 6 HOURS PRN
Status: DISCONTINUED | OUTPATIENT
Start: 2021-12-10 | End: 2021-12-11

## 2021-12-10 RX ORDER — MORPHINE SULFATE 2 MG/ML
2 INJECTION, SOLUTION INTRAMUSCULAR; INTRAVENOUS EVERY 2 HOUR PRN
Status: DISCONTINUED | OUTPATIENT
Start: 2021-12-10 | End: 2021-12-11

## 2021-12-10 RX ORDER — TEMAZEPAM 15 MG/1
15 CAPSULE ORAL NIGHTLY PRN
Status: DISCONTINUED | OUTPATIENT
Start: 2021-12-10 | End: 2021-12-16

## 2021-12-10 RX ORDER — MORPHINE SULFATE 4 MG/ML
INJECTION, SOLUTION INTRAMUSCULAR; INTRAVENOUS
Status: COMPLETED
Start: 2021-12-10 | End: 2021-12-10

## 2021-12-10 RX ORDER — ONDANSETRON 2 MG/ML
4 INJECTION INTRAMUSCULAR; INTRAVENOUS EVERY 6 HOURS PRN
Status: DISCONTINUED | OUTPATIENT
Start: 2021-12-10 | End: 2021-12-16

## 2021-12-10 NOTE — ED PROVIDER NOTES
Patient Seen in: [facility] Emergency Department    History   Patient presents with:  Abdomen/Flank Pain    Stated Complaint: abdominal pain and nausea    HPI    Patient presents to the emergency department with epigastric discomfort just above th (two) times daily. Patient taking differently: Apply 1 Application topically daily as needed.          Review of Systems  Constitutional: no fever  Cardiovascular: no chest pain  Respiratory: no shortness of breath  Genitourinary: no dysuria  Musculoskelet who accepted patient for admission also requested GI consult I did speak with Dr. Marguerite Jolly. I did update the patient with CT report.     ED Course     Labs Reviewed   HEPATIC FUNCTION PANEL (7) - Abnormal; Notable for the following components:       Result Va oximetry       Disposition and Plan     We recommend that you schedule follow up care with a primary care provider within the next three months to obtain basic health screening including reassessment of your blood pressure.       Clinical Impression:  Abdom

## 2021-12-10 NOTE — ED INITIAL ASSESSMENT (HPI)
Patient was having abdominal pain and distention starting on Wednesday. Patient then began noticing pain around her belly button starting today. Patient having intermittent nausea. Last BM this morning.

## 2021-12-10 NOTE — TELEPHONE ENCOUNTER
Action Requested: Summary for Provider     []  Critical Lab, Recommendations Needed  [] Need Additional Advice  [x]   FYI    []   Need Orders  [] Need Medications Sent to Pharmacy  []  Other     SUMMARY: Verified name and .   Patient states that she has

## 2021-12-10 NOTE — ED QUICK NOTES
Orders for admission, patient is aware of plan and ready to go upstairs.  Any questions, please call ED RN NICK RN  at extension [phone]  Type of COVID test sent: RAPID  COVID Suspicion level:NEGATIVE    Titratable drug(s) infusing:  Rate: NS 83ml/hr    LOC at

## 2021-12-11 PROCEDURE — 99232 SBSQ HOSP IP/OBS MODERATE 35: CPT | Performed by: INTERNAL MEDICINE

## 2021-12-11 PROCEDURE — 99233 SBSQ HOSP IP/OBS HIGH 50: CPT | Performed by: HOSPITALIST

## 2021-12-11 RX ORDER — HYDROMORPHONE HYDROCHLORIDE 1 MG/ML
1 INJECTION, SOLUTION INTRAMUSCULAR; INTRAVENOUS; SUBCUTANEOUS EVERY 4 HOURS PRN
Status: DISCONTINUED | OUTPATIENT
Start: 2021-12-11 | End: 2021-12-16

## 2021-12-11 RX ORDER — HYDROMORPHONE HYDROCHLORIDE 1 MG/ML
0.5 INJECTION, SOLUTION INTRAMUSCULAR; INTRAVENOUS; SUBCUTANEOUS EVERY 4 HOURS PRN
Status: DISCONTINUED | OUTPATIENT
Start: 2021-12-11 | End: 2021-12-16

## 2021-12-11 RX ORDER — ACETAMINOPHEN 325 MG/1
650 TABLET ORAL EVERY 6 HOURS PRN
Status: DISCONTINUED | OUTPATIENT
Start: 2021-12-11 | End: 2021-12-16

## 2021-12-11 NOTE — PROGRESS NOTES
[facility]     Gastroenterology Progress Note    Larry Das Patient Status:  Observation    1975 MRN [id number]   Location [facility] 5SW/SE Attending Mitra Webb Day # 0 PCP Haley Tinajero 83 104   CA 9.4 8.3*   ALB 3.9  --     140   K 4.2 4.1    107   CO2 25.0 27.0   ALKPHO 77  --    AST 32  --    ALT 64*  --    BILT 1.0  --    TP 7.9  --        No results found for: PT, INR    CT ABDOMEN PELVIS IV CONTRAST, NO ORAL (ER)    Resu

## 2021-12-11 NOTE — TELEPHONE ENCOUNTER
Patient went to [address] [place] [address] ER yesterday and was admitted. ASSESSMENT AND PLAN:    1. Epigastric pain with history of NSAID use. Rule out gastritis versus peptic ulcer disease.     2.       Left upper quadrant mesenteric lesion with surrounding fat strand

## 2021-12-11 NOTE — H&P
[facility]    PATIENT'S NAME: JAILENE Marion   ATTENDING PHYSICIAN: aNrcisa Downs.  Alyssa Kidd MD   PATIENT ACCOUNT#:   [id number]    LOCATION:  [place] [address]  MEDICAL RECORD #:   [id number]       YOB: 1975  ADMISSION DATE:       12/10/202 GENERAL:  Alert and oriented to time, place and person. Mild to moderate distress. Anxious. VITAL SIGNS:  Temperature 97.5, pulse 82, respiratory rate 18, blood pressure 125/74, pulse ox 96% on room air. HEENT:  Atraumatic. Oropharynx clear.   Dry mu

## 2021-12-11 NOTE — PLAN OF CARE
Patient alert and oriented x4. No acute signs during shift. Patient admitted 12/10 due to abdominal pain. Patient had one episode of vomiting and requested ice chips. Zofran, Tylenol, and Morphine given at end of shift. Patient updated with plan of care.  C pain and request assistance  - Assess pain using appropriate pain scale  - Administer analgesics based on type and severity of pain and evaluate response  - Implement non-pharmacological measures as appropriate and evaluate response  - Consider cultural an redness and/or skin breakdown  - Initiate interventions, skin care algorithm/standards of care as needed  Outcome: Progressing

## 2021-12-11 NOTE — PROGRESS NOTES
[facility]    Progress Note    Beebe Filler Patient Status:  Observation    1975 MRN [id number]   Location [facility] 5SW/SE Attending Jere Webb   [facility] Day # 0 PCP Elba Ibrahim MD     Subjective: 12/11/2021    ALB 3.9 12/10/2021    ALKPHO 77 12/10/2021    BILT 1.0 12/10/2021    TP 7.9 12/10/2021    AST 32 12/10/2021    ALT 64 (H) 12/10/2021    T4F 0.62 03/14/2013    TSH 2.230 01/31/2020     12/10/2021    ESRML 12 12/11/2021    CRP 2.92 (H) 1

## 2021-12-11 NOTE — CONSULTS
[facility]  Report of Consultation    Harshal Amaro Patient Status:  Observation    1975 MRN [id number]   Location [facility] 5SW/SE Attending Paula Webb [place] Day # 0 PCP Alla Cheatham MD     Reason for [facility] [place] [facility]     EAB x 2-, ;  X 3- , , 2009   • High cholesterol    • Hormone disorder    • Hyperlipidemia    • Infertility     Art insemination   • Lipid screening 3/10/2014    per NG   • Obesity, unspecified mg, 4 mg, Intravenous, Q6H PRN  •  prochlorperazine (COMPAZINE) injection 5 mg, 5 mg, Intravenous, Q8H PRN  •  temazepam (RESTORIL) cap 15 mg, 15 mg, Oral, Nightly PRN    Physical Exam:  Blood pressure 115/72, pulse 85, temperature 98.2 °F (36.8 °C), tem[place] Monday. Continue with IV fluids, diet as tolerated, pain control. Marlo Ken.  Dinora Fair MD  12/11/2021  12:55 PM

## 2021-12-11 NOTE — PLAN OF CARE
Problem: Patient Centered Care  Goal: Patient preferences are identified and integrated in the patient's plan of care  Description: Interventions:  - What would you like us to know as we care for you?   - Provide timely, complete, and accurate informatio and physical deficits and behaviors that affect risk of falls.   - [place] fall precautions as indicated by assessment.  - Educate pt/family on patient safety including physical limitations  - Instruct pt to call for assistance with activity based on asse

## 2021-12-12 ENCOUNTER — PATIENT MESSAGE (OUTPATIENT)
Dept: OBGYN CLINIC | Facility: CLINIC | Age: 46
End: 2021-12-12

## 2021-12-12 DIAGNOSIS — Z12.31 SCREENING MAMMOGRAM FOR BREAST CANCER: Primary | ICD-10-CM

## 2021-12-12 PROCEDURE — 99232 SBSQ HOSP IP/OBS MODERATE 35: CPT | Performed by: INTERNAL MEDICINE

## 2021-12-12 PROCEDURE — 99233 SBSQ HOSP IP/OBS HIGH 50: CPT | Performed by: HOSPITALIST

## 2021-12-12 NOTE — PROGRESS NOTES
[place] [facility] - [facility]    Progress Note    Ollie Britt Patient Status:  Observation    1975 MRN [id number]   Location [facility] 5SW/SE Attending Candido Webb   [facility] Day # 0 PCP Lake Vasquez MD     Subjective: 12/12/2021    ALB 3.0 (L) 12/12/2021    ALKPHO 68 12/12/2021    BILT 1.0 12/12/2021    TP 6.1 (L) 12/12/2021    AST 28 12/12/2021    ALT 48 12/12/2021    T4F 0.62 03/14/2013    TSH 2.230 01/31/2020     12/10/2021    ESRML 12 12/11/2021    CRP 2.92 (

## 2021-12-12 NOTE — PLAN OF CARE
Problem: Patient Centered Care  Goal: Patient preferences are identified and integrated in the patient's plan of care  Description: Interventions:  - What would you like us to know as we care for you?  I live with my family  - Provide timely, complete, an patient safety including physical limitations  - Instruct pt to call for assistance with activity based on assessment  - Modify environment to reduce risk of injury  - Provide assistive devices as appropriate  - Consider OT/PT consult to assist with streng

## 2021-12-12 NOTE — PROGRESS NOTES
Martha Momin [facility]     Gastroenterology Progress Note    Harshal Amaro Patient Status:  Observation    1975 MRN [id number]   Location [facility] 5SW/SE Attending Paula Webb Day # 0 PCP Sana Franklin 88.8   MCH 28.7 28.3 28.1   MCHC 33.1 32.3 31.7   RDW 12.2 12.2 12.4   NEPRELIM 5.55 6.05 5.35   WBC 9.3 9.9 8.5   .0 340.0 312.0         Recent Labs   Lab 12/10/21  1344 12/11/21  0547 12/12/21  0533   GLU 87 103* 101*   BUN 13 10 7   CREATSERUM 0.

## 2021-12-12 NOTE — PLAN OF CARE
Problem: PAIN - ADULT  Goal: Verbalizes/displays adequate comfort level or patient's stated pain goal  Description: INTERVENTIONS:  - Encourage pt to monitor pain and request assistance  - Assess pain using appropriate pain scale  - Administer analgesics Evaluate effectiveness of ordered antiemetic medications  - Provide nonpharmacologic comfort measures as appropriate  - Advance diet as tolerated, if ordered  - Obtain nutritional consult as needed  - Evaluate fluid balance  Outcome: Progressing

## 2021-12-13 ENCOUNTER — APPOINTMENT (OUTPATIENT)
Dept: CT IMAGING | Facility: HOSPITAL | Age: 46
DRG: 842 | End: 2021-12-13
Attending: HOSPITALIST
Payer: COMMERCIAL

## 2021-12-13 PROCEDURE — 0DBV3ZX EXCISION OF MESENTERY, PERCUTANEOUS APPROACH, DIAGNOSTIC: ICD-10-PCS | Performed by: RADIOLOGY

## 2021-12-13 PROCEDURE — 99153 MOD SED SAME PHYS/QHP EA: CPT | Performed by: HOSPITALIST

## 2021-12-13 PROCEDURE — 38505 NEEDLE BIOPSY LYMPH NODES: CPT | Performed by: HOSPITALIST

## 2021-12-13 PROCEDURE — 77012 CT SCAN FOR NEEDLE BIOPSY: CPT | Performed by: HOSPITALIST

## 2021-12-13 PROCEDURE — 99233 SBSQ HOSP IP/OBS HIGH 50: CPT | Performed by: HOSPITALIST

## 2021-12-13 PROCEDURE — 99152 MOD SED SAME PHYS/QHP 5/>YRS: CPT | Performed by: HOSPITALIST

## 2021-12-13 PROCEDURE — 99233 SBSQ HOSP IP/OBS HIGH 50: CPT | Performed by: PHYSICIAN ASSISTANT

## 2021-12-13 RX ORDER — MIDAZOLAM HYDROCHLORIDE 1 MG/ML
INJECTION INTRAMUSCULAR; INTRAVENOUS
Status: COMPLETED
Start: 2021-12-13 | End: 2021-12-13

## 2021-12-13 RX ORDER — SODIUM CHLORIDE 9 MG/ML
INJECTION, SOLUTION INTRAVENOUS CONTINUOUS
Status: DISCONTINUED | OUTPATIENT
Start: 2021-12-13 | End: 2021-12-13

## 2021-12-13 RX ORDER — NALOXONE HYDROCHLORIDE 0.4 MG/ML
80 INJECTION, SOLUTION INTRAMUSCULAR; INTRAVENOUS; SUBCUTANEOUS AS NEEDED
Status: DISCONTINUED | OUTPATIENT
Start: 2021-12-13 | End: 2021-12-15 | Stop reason: HOSPADM

## 2021-12-13 RX ORDER — MIDAZOLAM HYDROCHLORIDE 1 MG/ML
1 INJECTION INTRAMUSCULAR; INTRAVENOUS EVERY 5 MIN PRN
Status: DISCONTINUED | OUTPATIENT
Start: 2021-12-13 | End: 2021-12-13

## 2021-12-13 RX ORDER — FLUMAZENIL 0.1 MG/ML
0.2 INJECTION, SOLUTION INTRAVENOUS AS NEEDED
Status: DISCONTINUED | OUTPATIENT
Start: 2021-12-13 | End: 2021-12-15 | Stop reason: HOSPADM

## 2021-12-13 NOTE — PROGRESS NOTES
[facility]    Progress Note    Doyle Varner Patient Status:  Observation    1975 MRN [id number]   Location [facility] 5SW/SE Attending Paula Webb Day # 1 PCP Amauri Hines MD     Subjective: ALB 3.0 (L) 12/13/2021    ALKPHO 73 12/13/2021    BILT 1.0 12/13/2021    TP 6.4 12/13/2021    AST 30 12/13/2021    ALT 49 12/13/2021    T4F 0.62 03/14/2013    TSH 2.230 01/31/2020     12/10/2021    ESRML 12 12/11/2021    CRP 2.92 (H) 12/11/2021

## 2021-12-13 NOTE — TELEPHONE ENCOUNTER
From: [facility]  To: Mela Stovall. DO Kiran  Sent: 12/12/2021 12:02 PM CST  Subject: Ct scan    Hello, I was admitted into the hospital on Friday for abdominal pain. Unrelated to my issue, they found something within my cervix.  Could Dr. Mauri Scales look at

## 2021-12-13 NOTE — PLAN OF CARE
Problem: Patient/Family Goals  Goal: Patient/Family Short Term Goal  Description: Patient's Short Term Goal: pain management    Interventions:   - pain medications as needed  -follow md orders  -oral intake as tolerated  - See additional Care Plan goals effectiveness of ordered antiemetic medications  - Provide nonpharmacologic comfort measures as appropriate  - Advance diet as tolerated, if ordered  - Obtain nutritional consult as needed  - Evaluate fluid balance  Outcome: Progressing      Patient is amos

## 2021-12-13 NOTE — PROGRESS NOTES
Martha Momin [facility]     Gastroenterology Progress Note    Harshal Bermudez Patient Status:  Inpatient    1975 MRN [id number]   Location [facility] 5SW/SE Attending Paula Webb Day # 1 PCP Ava Dinero clear liquids after biopsy. Recommend ADAT and await PATH from bx. Could consider EGD if more focal UGI symptoms however none today. Recommend:  -await PATH  -PPI daily  -ADAT  -avoid NSAIDs  -see above    Case d/w Dr. Oneida Rojas and LACY Delaney.  > 35minute f/u wit

## 2021-12-13 NOTE — IMAGING NOTE
3192 PATIENT TO HAVE CT GUIDED BIOPSY OF LUQ LYMPH NODE- MESENTERIC MASS. POSITION TO BE SUPINE. LABS , ALLERGY TO LATEX.     4233 ARRIVED TO CT #2 VIA BED. BROUGHT TO CT #2, SUPINE POSITION.  MONITOR CONNECTED, IV FLUIDS INFUSING.    1003 DR CHRISTIANSON HE

## 2021-12-13 NOTE — PLAN OF CARE
Problem: PAIN - ADULT  Goal: Verbalizes/displays adequate comfort level or patient's stated pain goal  Description: INTERVENTIONS:  - Encourage pt to monitor pain and request assistance  - Assess pain using appropriate pain scale  - Administer analgesics b measures as appropriate  - Advance diet as tolerated, if ordered  - Obtain nutritional consult as needed  - Evaluate fluid balance  Outcome: Progressing  Assessed for N/V, discussed NPO status for possible biopsy today.  Pt with active bowel sounds in all q

## 2021-12-14 PROCEDURE — 99233 SBSQ HOSP IP/OBS HIGH 50: CPT | Performed by: INTERNAL MEDICINE

## 2021-12-14 PROCEDURE — 99233 SBSQ HOSP IP/OBS HIGH 50: CPT | Performed by: HOSPITALIST

## 2021-12-14 NOTE — PROGRESS NOTES
[place] [facility] - [facility]    Progress Note    Ollie Britt Patient Status:  Observation    1975 MRN [id number]   Location [facility] 5SW/SE Attending Paula Webb Day # 2 PCP Lake Vasquez MD     Subjective: ALB 3.3 (L) 12/13/2021    ALKPHO 81 12/13/2021    BILT 1.0 12/13/2021    TP 6.7 12/13/2021    AST 41 (H) 12/13/2021    ALT 60 (H) 12/13/2021    T4F 0.62 03/14/2013    TSH 2.230 01/31/2020     12/10/2021    ESRML 12 12/11/2021    CRP 2.92 (H) 12/1

## 2021-12-14 NOTE — PLAN OF CARE
Problem: Patient Centered Care  Goal: Patient preferences are identified and integrated in the patient's plan of care  Description: Interventions:  - What would you like us to know as we care for you?  I do live at home with my family  - Provide timely, c Progressing     Problem: SAFETY ADULT - FALL  Goal: Free from fall injury  Description: INTERVENTIONS:  - Assess pt frequently for physical needs  - Identify cognitive and physical deficits and behaviors that affect risk of falls.   - [place] fall precaut

## 2021-12-14 NOTE — PLAN OF CARE
Problem: Patient Centered Care  Goal: Patient preferences are identified and integrated in the patient's plan of care  Description: Interventions:  - What would you like us to know as we care for you?   - Provide timely, complete, and accurate informatio INTERVENTIONS:  - Assess pt frequently for physical needs  - Identify cognitive and physical deficits and behaviors that affect risk of falls.   - [place] fall precautions as indicated by assessment.  - Educate pt/family on patient safety including physic

## 2021-12-15 ENCOUNTER — ANESTHESIA EVENT (OUTPATIENT)
Dept: ENDOSCOPY | Facility: HOSPITAL | Age: 46
DRG: 842 | End: 2021-12-15
Payer: COMMERCIAL

## 2021-12-15 ENCOUNTER — ANESTHESIA (OUTPATIENT)
Dept: ENDOSCOPY | Facility: HOSPITAL | Age: 46
DRG: 842 | End: 2021-12-15
Payer: COMMERCIAL

## 2021-12-15 PROCEDURE — 0DB78ZX EXCISION OF STOMACH, PYLORUS, VIA NATURAL OR ARTIFICIAL OPENING ENDOSCOPIC, DIAGNOSTIC: ICD-10-PCS | Performed by: INTERNAL MEDICINE

## 2021-12-15 PROCEDURE — 43239 EGD BIOPSY SINGLE/MULTIPLE: CPT | Performed by: INTERNAL MEDICINE

## 2021-12-15 PROCEDURE — 99253 IP/OBS CNSLTJ NEW/EST LOW 45: CPT | Performed by: STUDENT IN AN ORGANIZED HEALTH CARE EDUCATION/TRAINING PROGRAM

## 2021-12-15 PROCEDURE — 99233 SBSQ HOSP IP/OBS HIGH 50: CPT | Performed by: HOSPITALIST

## 2021-12-15 PROCEDURE — 0DB68ZX EXCISION OF STOMACH, VIA NATURAL OR ARTIFICIAL OPENING ENDOSCOPIC, DIAGNOSTIC: ICD-10-PCS | Performed by: INTERNAL MEDICINE

## 2021-12-15 RX ORDER — NALOXONE HYDROCHLORIDE 0.4 MG/ML
80 INJECTION, SOLUTION INTRAMUSCULAR; INTRAVENOUS; SUBCUTANEOUS AS NEEDED
Status: DISCONTINUED | OUTPATIENT
Start: 2021-12-15 | End: 2021-12-15 | Stop reason: HOSPADM

## 2021-12-15 RX ORDER — SODIUM CHLORIDE, SODIUM LACTATE, POTASSIUM CHLORIDE, CALCIUM CHLORIDE 600; 310; 30; 20 MG/100ML; MG/100ML; MG/100ML; MG/100ML
INJECTION, SOLUTION INTRAVENOUS CONTINUOUS
Status: DISCONTINUED | OUTPATIENT
Start: 2021-12-15 | End: 2021-12-15

## 2021-12-15 RX ORDER — ENOXAPARIN SODIUM 100 MG/ML
40 INJECTION SUBCUTANEOUS DAILY
Status: DISCONTINUED | OUTPATIENT
Start: 2021-12-15 | End: 2021-12-16

## 2021-12-15 RX ORDER — SODIUM CHLORIDE 9 MG/ML
INJECTION, SOLUTION INTRAVENOUS CONTINUOUS PRN
Status: DISCONTINUED | OUTPATIENT
Start: 2021-12-15 | End: 2021-12-15 | Stop reason: SURG

## 2021-12-15 RX ORDER — ROSUVASTATIN CALCIUM 20 MG/1
TABLET, COATED ORAL
Qty: 30 TABLET | Refills: 0 | OUTPATIENT
Start: 2021-12-15

## 2021-12-15 RX ORDER — LIDOCAINE HYDROCHLORIDE 10 MG/ML
INJECTION, SOLUTION EPIDURAL; INFILTRATION; INTRACAUDAL; PERINEURAL AS NEEDED
Status: DISCONTINUED | OUTPATIENT
Start: 2021-12-15 | End: 2021-12-15 | Stop reason: SURG

## 2021-12-15 RX ADMIN — SODIUM CHLORIDE: 9 INJECTION, SOLUTION INTRAVENOUS at 11:19:00

## 2021-12-15 RX ADMIN — SODIUM CHLORIDE: 9 INJECTION, SOLUTION INTRAVENOUS at 11:08:00

## 2021-12-15 RX ADMIN — LIDOCAINE HYDROCHLORIDE 50 MG: 10 INJECTION, SOLUTION EPIDURAL; INFILTRATION; INTRACAUDAL; PERINEURAL at 11:10:00

## 2021-12-15 NOTE — PROGRESS NOTES
Martha Momin 98     Gastroenterology Progress Note    Walter Metcalf Patient Status:  Inpatient    1975 MRN [id number]   Location [facility] 5SW/SE Attending Paula Webb Day # 2 PCP Manuel Morin demonstrated understanding], including but not limited to the risks of bleeding, infection, pain, death, as well as the risks of anesthesia and perforation all leading to prolonged hospitalization, surgical intervention, or even death.  The patient has agre

## 2021-12-15 NOTE — PROGRESS NOTES
[place] [facility] - [facility]    Progress Note    [place] Filler Patient Status:  Observation    1975 MRN [id number]   Location [facility] 5SW/SE Attending Kelly Arevalo MD   Hosp Day # 3 PCP Elba Ibrahim MD     Subjective:     Constitu (L) 12/13/2021    ALKPHO 81 12/13/2021    BILT 1.0 12/13/2021    TP 6.7 12/13/2021    AST 41 (H) 12/13/2021    ALT 60 (H) 12/13/2021    T4F 0.62 03/14/2013    TSH 2.230 01/31/2020     12/10/2021    ESRML 12 12/11/2021    CRP 2.92 (H) 12/11/2021    M

## 2021-12-15 NOTE — ANESTHESIA PREPROCEDURE EVALUATION
Anesthesia PreOp Note    HPI:     Herrera Hong is a 51year old female who presents for preoperative consultation requested by:  Marcelina Gerardo MD    Date of Surgery: 12/10/2021 - 12/15/2021    Procedure(s):  ESOPHAGOGASTRODUODENOSCOPY (EGD)  Indication: 2009    EAB x 2-, ;  X 3- , ?, 2009   • High cholesterol    • Hormone disorder    • Hyperlipidemia    • Infertility     Art insemination   • Lipid screening 3/10/2014    per NG   • Obesity, unspecified    • Polycystic ovaries     Ar injection 5 mg, 5 mg, Intravenous, Q8H PRN, Pedro Guerrero MD  temazepam (RESTORIL) cap 15 mg, 15 mg, Oral, Nightly PRN, Pedro Guerrero MD, 15 mg at 12/14/21 2250    No current [facility]-ordered outpatient medications on file.         Latex Exercise: Yes          walking daily        Bike Helmet: Not Asked        Seat Belt: Not Asked        Self-Exams: Not Asked        Grew up on a farm: Not Asked        History of tanning: Yes        Outdoor occupation: Not Asked        Pt has a pacemaker: N negative ROS and normal exam    Neuro/Psych      GI/Hepatic/Renal - negative ROS     Endo/Other - negative ROS   Abdominal                Anesthesia Plan:   ASA:  2  Plan:   MAC  Plan Comments: I have discussed the anesthetic plan, major risks and alternat

## 2021-12-15 NOTE — CONSULTS
[facility] AT [facility]ematology/Oncology [facility]  Initial Inpatient Consult Note    Geradine Severe Patient Status:  Inpatient    1975 MRN [id number]   Location [facility] 5SW/SE Attending Van Chandler MD   Hosp Day # 3 PCP Shanice Jenkins MD denies illicit drug use. Her mother had early stage breast cancer treated with a mastectomy in the patient has regular mammograms and is up-to-date. No other family history of cancer.     Review of Systems:  Hematology/Oncology ROS performed and negative Intravenous, Once  [COMPLETED] Pantoprazole Sodium (PROTONIX) 40 mg in sodium chloride (PF) 0.9 % 10 mL IV push, 40 mg, Intravenous, Once  morphINE sulfate (PF) 2 MG/ML injection 1 mg, 1 mg, Intravenous, Q2H PRN  ondansetron (ZOFRAN) injection 4 mg, 4 mg, control/protection: Vasectomy    Other Topics      Concerns:         Service: Not Asked        Blood Transfusions: Not Asked        Caffeine Concern: Yes          Coffee, 1 cup daily        Occupational Exposure: Not Asked        Hobby Hazards: Not AM    .0 12/15/2021 05:37 AM       Lab Results   Component Value Date/Time    GLU 86 12/15/2021 05:37 AM    BUN 6 (L) 12/15/2021 05:37 AM    CREATSERUM 0.74 12/15/2021 05:37 AM    GFRNAA 97 12/15/2021 05:37 AM    CA 8.5 12/15/2021 05:37 AM    ALB 3. negative. Flow cytometry performed on the biopsy demonstrates demonstrates a monoclonal B-lymphocyte population that expresses CD10, CD19, CD20 and CD45, and bright surface lambda immunoglobulin light chain restriction.    The morphological, immunohistoche with active surveillance and the patient has no B symptoms or cytopenias and appears to have limited disease, her left upper quadrant pain and likely compressive symptoms from her mass deem her appropriate for treatment initiation as she meets GELF criteri

## 2021-12-15 NOTE — H&P
History & Physical Examination    Patient Name: Kyle Barcenas  MRN: [id number]  CSN: [id number]  YOB: 1975    Diagnosis: luq pain    Kyle Barcenas is a a(n) 51year old female who has a history of polycystic ovary, hyperlipidemia, prior C-s Comment:Other reaction(s): rash    Past Medical History:   Diagnosis Date   • Artificial insemination    • Cholecystitis 2011    Laser cholecystectomy 2011   • Decorative tattoo    • Degenerative lumbar disc 2005    Degenerative L2 disc, T [ Rizwan Pimentel Allison.Righter ] [ Coral Robertson Allison.Righter ] [ Jason Galaviz Allison.Righter ] [ Abhinav Miller        I have discussed the risks and benefits and alternatives of the procedure with the patient/family. They understand and agree to proceed with plan of care.    I have revie

## 2021-12-15 NOTE — ANESTHESIA POSTPROCEDURE EVALUATION
Patient: Larry Das    Procedure Summary     Date: 12/15/21 Room / Location: [address] [place] [address] ENDOSCOPY 05 / [address] [place] [address] ENDOSCOPY    Anesthesia Start: 7425 Anesthesia Stop:     Procedure: ESOPHAGOGASTRODUODENOSCOPY (EGD) (N/A ) Diagnosis: (gastric erythema)    Surgeons: Ivana Sharma

## 2021-12-15 NOTE — PLAN OF CARE
Problem: Patient Centered Care  Goal: Patient preferences are identified and integrated in the patient's plan of care  Description: Interventions:  - What would you like us to know as we care for you? I'm living at home with my  and 2 sons.   - Pro Encourage toileting schedule  Outcome: Progressing     Problem: GASTROINTESTINAL - ADULT  Goal: Minimal or absence of nausea and vomiting  Description: INTERVENTIONS:  - Maintain adequate hydration with IV or PO as ordered and tolerated  - Nasogastric tube

## 2021-12-15 NOTE — TELEPHONE ENCOUNTER
Patient messaging for updated.      JALEN-please advise on incidental finding on CT    PELVIC ORGANS: The cervix has a nonspecific hypoenhancing appearance with large cystic structures, perhaps indicative of nabothian cysts; these features are not well charac

## 2021-12-15 NOTE — PLAN OF CARE
Problem: Patient Centered Care  Goal: Patient preferences are identified and integrated in the patient's plan of care  Description: Interventions:  - What would you like us to know as we care for you?   - Provide timely, complete, and accurate informatio Progressing     Problem: GASTROINTESTINAL - ADULT  Goal: Minimal or absence of nausea and vomiting  Description: INTERVENTIONS:  - Maintain adequate hydration with IV or PO as ordered and tolerated  - Nasogastric tube to low intermittent suction as ordered

## 2021-12-15 NOTE — OPERATIVE REPORT
ESOPHAGOGASTRODUODENOSCOPY (EGD) [facility]     1975 Age 51year old   PCP Waqar Rossi MD Endoscopist Dipti Mcgowan MD     Date of procedure: 12/15/21    Procedure: EGD w/biopsies    Pre-operative diagnosis: luq pain    Post-oper 3. Duodenum: The duodenal mucosa appeared normal in the 1st and 2nd portion of the duodenum. We then withdrew the instrument from the patient who tolerated the procedure well. Impression:   1. Diffuse gastric erythema, biopsied.   2. Otherwise n

## 2021-12-16 ENCOUNTER — TELEPHONE (OUTPATIENT)
Dept: GASTROENTEROLOGY | Facility: CLINIC | Age: 46
End: 2021-12-16

## 2021-12-16 VITALS
WEIGHT: 206 LBS | RESPIRATION RATE: 18 BRPM | HEART RATE: 72 BPM | TEMPERATURE: 98 F | DIASTOLIC BLOOD PRESSURE: 84 MMHG | OXYGEN SATURATION: 98 % | BODY MASS INDEX: 36.5 KG/M2 | HEIGHT: 63 IN | SYSTOLIC BLOOD PRESSURE: 122 MMHG

## 2021-12-16 DIAGNOSIS — C82.80 OTHER TYPE OF FOLLICULAR LYMPHOMA, UNSPECIFIED BODY REGION (HCC): Primary | ICD-10-CM

## 2021-12-16 DIAGNOSIS — K63.89 MESENTERIC MASS: Primary | ICD-10-CM

## 2021-12-16 PROBLEM — R10.9 ABDOMINAL PAIN, ACUTE: Status: RESOLVED | Noted: 2021-12-10 | Resolved: 2021-12-16

## 2021-12-16 PROBLEM — C83.03 SMALL CELL B-CELL LYMPHOMA OF INTRA-ABDOMINAL LYMPH NODES (HCC): Status: ACTIVE | Noted: 2021-12-16

## 2021-12-16 PROCEDURE — 99239 HOSP IP/OBS DSCHRG MGMT >30: CPT | Performed by: HOSPITALIST

## 2021-12-16 RX ORDER — HYDROCODONE BITARTRATE AND ACETAMINOPHEN 5; 325 MG/1; MG/1
1 TABLET ORAL EVERY 6 HOURS PRN
Status: DISCONTINUED | OUTPATIENT
Start: 2021-12-16 | End: 2021-12-16

## 2021-12-16 RX ORDER — HYDROCODONE BITARTRATE AND ACETAMINOPHEN 5; 325 MG/1; MG/1
1 TABLET ORAL EVERY 6 HOURS PRN
Qty: 20 TABLET | Refills: 0 | Status: SHIPPED | OUTPATIENT
Start: 2021-12-16 | End: 2021-12-30

## 2021-12-16 NOTE — TELEPHONE ENCOUNTER
Pt states that she was referred to a oncologist by Dr. New Estimable but she needs a referral for this.  Please follow up

## 2021-12-16 NOTE — PLAN OF CARE
Problem: Patient Centered Care  Goal: Patient preferences are identified and integrated in the patient's plan of care  Description: Interventions:  - What would you like us to know as we care for you?  From home with   - Provide timely, complete, a Adequate for Discharge     Problem: SAFETY ADULT - FALL  Goal: Free from fall injury  Description: INTERVENTIONS:  - Assess pt frequently for physical needs  - Identify cognitive and physical deficits and behaviors that affect risk of falls.   - [place] f

## 2021-12-16 NOTE — TELEPHONE ENCOUNTER
I placed the referral for Ocology.     I left a detailed message for the pt and told her to call back with questions or concerns    Phone number to Hem/Onc provided to Hem/Onc provided [phone]

## 2021-12-16 NOTE — DISCHARGE SUMMARY
[facility]    Discharge Summary    Adolphusdevi Barcenas Patient Status:  Inpatient    1975 MRN [id number]   Location [facility] 5SW/SE Attending Sachin Chavez MD   Hosp Day # 4 PCP Christian Mcdonald MD     Date of Admission:  of the cervix  - outpatient OB/GYN follow up; patient sees Dr. Tiny Banegas     #  Obesity   - BMI 36.49    Dispo: home      Physical Examination:  Vital Signs:  Blood pressure 122/84, pulse 72, temperature 97.8 °F (36.6 °C), temperature source Oral, resp.  rate at 4:39 PM     Finalized by (CST): Freddy Matamoros MD on 12/10/2021 at 4:50 PM            LABS :     Lab Results   Component Value Date    WBC 7.9 12/15/2021    HGB 13.0 12/15/2021    HCT 39.1 12/15/2021    .0 12/15/2021    CREATSERUM 0.74 12/15/202 how you take these medications      Instructions Prescription details   triamcinolone 0.1 % Oint  Commonly known as: KENALOG  What changed:   · when to take this  · reasons to take this      Apply 1 Application topically 2 (two) times daily.    Quantity: 30

## 2021-12-17 ENCOUNTER — TELEPHONE (OUTPATIENT)
Dept: HEMATOLOGY/ONCOLOGY | Facility: HOSPITAL | Age: 46
End: 2021-12-17

## 2021-12-17 NOTE — TELEPHONE ENCOUNTER
I spoke with [facility] and discussed results. There are no worrisome findings in pelvis on CT. Nabothian cysts and small ovarian cysts are normal findings. She is seeing Dr Asif Molina on the 30th. PET scan on the 28th.  I ordered screen mammo but I want her to discus

## 2021-12-17 NOTE — TELEPHONE ENCOUNTER
[facility], she indeed requesting to be seen by Dr Cruz Calderón post PET. It told her we will call her on Monday with a time.

## 2021-12-17 NOTE — TELEPHONE ENCOUNTER
See pt's second mychart msg. Pt advised still awaiting for JALEN's recs and apologized for the wait. [facility] has PET scan scheduled on 12/28 and has to be there at 6:15am and wondering if JALEN wants to do additional testing.  Also \[facility] [place] [facility]\"" has appt with onc on 12

## 2021-12-17 NOTE — TELEPHONE ENCOUNTER
Patient is calling to schedule an appointment with Dr. Ke Nassar after her PET SCAN on 12/28/21, referred by DR. Travis. Please advise

## 2021-12-20 ENCOUNTER — MED REC SCAN ONLY (OUTPATIENT)
Dept: GASTROENTEROLOGY | Facility: CLINIC | Age: 46
End: 2021-12-20

## 2021-12-22 ENCOUNTER — TELEPHONE (OUTPATIENT)
Dept: GASTROENTEROLOGY | Facility: CLINIC | Age: 46
End: 2021-12-22

## 2021-12-22 RX ORDER — OMEPRAZOLE 20 MG/1
20 CAPSULE, DELAYED RELEASE ORAL
Qty: 28 CAPSULE | Refills: 0 | Status: SHIPPED | OUTPATIENT
Start: 2021-12-22 | End: 2022-01-05

## 2021-12-22 RX ORDER — BISMUTH SUBSALICYLATE 262 MG/1
2 TABLET, CHEWABLE ORAL 4 TIMES DAILY
Qty: 112 TABLET | Refills: 0 | Status: SHIPPED | OUTPATIENT
Start: 2021-12-22 | End: 2021-12-30

## 2021-12-22 RX ORDER — TETRACYCLINE HYDROCHLORIDE 500 MG/1
500 CAPSULE ORAL 4 TIMES DAILY
Qty: 56 CAPSULE | Refills: 0 | Status: SHIPPED | OUTPATIENT
Start: 2021-12-22 | End: 2021-12-22

## 2021-12-22 RX ORDER — METRONIDAZOLE 250 MG/1
250 TABLET ORAL 4 TIMES DAILY
Qty: 56 TABLET | Refills: 0 | Status: SHIPPED | OUTPATIENT
Start: 2021-12-22 | End: 2021-12-22

## 2021-12-22 RX ORDER — AMOXICILLIN 500 MG/1
1000 TABLET, FILM COATED ORAL 2 TIMES DAILY
Qty: 56 TABLET | Refills: 0 | Status: SHIPPED | OUTPATIENT
Start: 2021-12-22 | End: 2022-01-04

## 2021-12-22 RX ORDER — CLARITHROMYCIN 500 MG/1
500 TABLET, COATED ORAL 2 TIMES DAILY
Qty: 28 TABLET | Refills: 0 | Status: SHIPPED | OUTPATIENT
Start: 2021-12-22 | End: 2021-12-28

## 2021-12-22 NOTE — TELEPHONE ENCOUNTER
Spoke with [place]. Verified glenn newell    Reviewed MD response below as well as discussed h pylori education and plan of care going forward  treatment.      States she will begin treatment today and verified pharmacy in which prescriptions were sent e-scr

## 2021-12-22 NOTE — TELEPHONE ENCOUNTER
Pt called in stating she is having symptoms and would like to speak to RN about this. She is having stomach pain did not go into details.  Please call

## 2021-12-22 NOTE — TELEPHONE ENCOUNTER
Returned [facility]. Needed to verify medication instructions and ensure won't interfere with PET scan scheduled later this week.      Directed her to reach out to heme/onc to confirm no special medication holds but from GI standpoint should not inte

## 2021-12-22 NOTE — TELEPHONE ENCOUNTER
I spoke with patient. She states she continues to have RUQ pain/uncomfortable feeling after she eats,bloating and body aches. Pain level 3. Patient was recently discharged from the hospital and diagnosed with non-Hodgkin's lymphoma.   Taking gas-x and Norc but wants to follow up with Dr. Danitza Roach outpatient, because patient's mother used to see Dr. Danitza Roach.  Patient states she will call Dr. Simona Shea office.      # Epigastric pain  - EGD 12/15 per GI: diffuse gastric erythema, otherwise normal EGD. Upper GI symptoms likel

## 2021-12-22 NOTE — TELEPHONE ENCOUNTER
Dr. Mauricio Mccann--    Patient is at the pharmacy now and was informed the tetracycline will cost > $300. Would like an alternative due to too expensive to pay copay.      Thank you Introduction Text (Please End With A Colon): The following procedure was deferred: excision Procedure To Be Performed At Next Visit: Excision Detail Level: Detailed

## 2021-12-22 NOTE — TELEPHONE ENCOUNTER
Appears was sent to [facility] but treatment not sent  Would order treatment and for eradication in 8 weeks after completion  Avoid milk products as possible   Eat bland diet  Reassess after treatment for Hpylori    · We saw gastritis and inflammation in your s

## 2021-12-22 NOTE — TELEPHONE ENCOUNTER
Contacted [facility] pharmacy in [address] [place] [address] with Denise. Verified patient name and .      Currently in filling new prescriptions and provided below out of pocket expenses--     Omeprazole: $7.23  Amoxicillin: $15.01   Clarithromycin: $46.78     Spoke wi

## 2021-12-23 ENCOUNTER — TELEPHONE (OUTPATIENT)
Dept: GASTROENTEROLOGY | Facility: CLINIC | Age: 46
End: 2021-12-23

## 2021-12-23 PROCEDURE — 96374 THER/PROPH/DIAG INJ IV PUSH: CPT

## 2021-12-23 PROCEDURE — 99284 EMERGENCY DEPT VISIT MOD MDM: CPT

## 2021-12-23 PROCEDURE — 96375 TX/PRO/DX INJ NEW DRUG ADDON: CPT

## 2021-12-23 NOTE — TELEPHONE ENCOUNTER
Patient states she is feeling Nauseated with lower back pain from antibiotics. Please call - ok to leave detailed message. Thank you.

## 2021-12-23 NOTE — TELEPHONE ENCOUNTER
Patient states she took 2 doses of her H.Pylori medication and feels terrible. States has abdominal cramping /pain, severe back pain and nausea. Denies diarrhea,vomiting or fever.   I advised patient not to take anymore medication till she hears back fro

## 2021-12-23 NOTE — TELEPHONE ENCOUNTER
No refill.  He has not been seen in over a year.   Pt. Is returning the nurses call. I tried to reach RN but no answer.  Phone rings and then it goes to busy signal.

## 2021-12-23 NOTE — TELEPHONE ENCOUNTER
Patient contacted and message from Dr. Valeria [place] given. Patient voiced understanding. Patient states she will call back on Monday with a f/u.

## 2021-12-23 NOTE — TELEPHONE ENCOUNTER
Unsure if related to medication  If ongoing pain go to ER  Hold off for a few days and just continue the omeprazole in case there is an ulcer.    Call back Monday and can consider changing therapy if improvement in symptoms to quadruple therapy if covered b 06-Oct-2017 13:34

## 2021-12-24 ENCOUNTER — HOSPITAL ENCOUNTER (EMERGENCY)
Facility: HOSPITAL | Age: 46
Discharge: HOME | End: 2021-12-24
Attending: EMERGENCY MEDICINE
Payer: COMMERCIAL

## 2021-12-24 ENCOUNTER — TELEPHONE (OUTPATIENT)
Dept: INTERNAL MEDICINE CLINIC | Facility: CLINIC | Age: 46
End: 2021-12-24

## 2021-12-24 VITALS
HEART RATE: 80 BPM | BODY MASS INDEX: 30.73 KG/M2 | SYSTOLIC BLOOD PRESSURE: 122 MMHG | TEMPERATURE: 98 F | OXYGEN SATURATION: 96 % | DIASTOLIC BLOOD PRESSURE: 72 MMHG | RESPIRATION RATE: 18 BRPM | HEIGHT: 64 IN | WEIGHT: 180 LBS

## 2021-12-24 DIAGNOSIS — R10.13 EPIGASTRIC PAIN: Primary | ICD-10-CM

## 2021-12-24 PROCEDURE — 85025 COMPLETE CBC W/AUTO DIFF WBC: CPT | Performed by: EMERGENCY MEDICINE

## 2021-12-24 PROCEDURE — 80076 HEPATIC FUNCTION PANEL: CPT | Performed by: EMERGENCY MEDICINE

## 2021-12-24 PROCEDURE — S0028 INJECTION, FAMOTIDINE, 20 MG: HCPCS | Performed by: EMERGENCY MEDICINE

## 2021-12-24 PROCEDURE — 83690 ASSAY OF LIPASE: CPT | Performed by: EMERGENCY MEDICINE

## 2021-12-24 PROCEDURE — 80048 BASIC METABOLIC PNL TOTAL CA: CPT | Performed by: EMERGENCY MEDICINE

## 2021-12-24 RX ORDER — OXYCODONE HYDROCHLORIDE AND ACETAMINOPHEN 5; 325 MG/1; MG/1
1 TABLET ORAL EVERY 6 HOURS PRN
Qty: 15 TABLET | Refills: 0 | Status: SHIPPED | OUTPATIENT
Start: 2021-12-24 | End: 2021-12-31

## 2021-12-24 RX ORDER — FAMOTIDINE 40 MG/1
40 TABLET, FILM COATED ORAL DAILY
Qty: 30 TABLET | Refills: 0 | Status: SHIPPED | OUTPATIENT
Start: 2021-12-24 | End: 2021-12-30

## 2021-12-24 RX ORDER — MAGNESIUM HYDROXIDE/ALUMINUM HYDROXICE/SIMETHICONE 120; 1200; 1200 MG/30ML; MG/30ML; MG/30ML
10 SUSPENSION ORAL 4 TIMES DAILY PRN
Qty: 355 ML | Refills: 0 | Status: SHIPPED | OUTPATIENT
Start: 2021-12-24 | End: 2021-12-30

## 2021-12-24 RX ORDER — MAGNESIUM HYDROXIDE/ALUMINUM HYDROXICE/SIMETHICONE 120; 1200; 1200 MG/30ML; MG/30ML; MG/30ML
30 SUSPENSION ORAL ONCE
Status: COMPLETED | OUTPATIENT
Start: 2021-12-24 | End: 2021-12-24

## 2021-12-24 RX ORDER — SUCRALFATE 1 G/1
1 TABLET ORAL
Qty: 40 TABLET | Refills: 0 | Status: SHIPPED | OUTPATIENT
Start: 2021-12-24 | End: 2021-12-30

## 2021-12-24 RX ORDER — FAMOTIDINE 10 MG/ML
20 INJECTION, SOLUTION INTRAVENOUS ONCE
Status: COMPLETED | OUTPATIENT
Start: 2021-12-24 | End: 2021-12-24

## 2021-12-24 RX ORDER — KETOROLAC TROMETHAMINE 15 MG/ML
15 INJECTION, SOLUTION INTRAMUSCULAR; INTRAVENOUS ONCE
Status: COMPLETED | OUTPATIENT
Start: 2021-12-24 | End: 2021-12-24

## 2021-12-24 NOTE — TELEPHONE ENCOUNTER
Dr. Vanessa Mcneal (office on call),    Patient called in to provide update. States she went to ED last night due to ongoing pain. She was prescribed oxycodone, sucralfate, and famotidine.  Patient questioned why medications weren't prescribed by GI doctor when she

## 2021-12-24 NOTE — TELEPHONE ENCOUNTER
Patient was admitted to the hospital 2 weeks ago and they did a CT/bx. She was diagnosed with non-hodgkins lymphoma. They also did an upper endosopy and she was diagnosed with H Pylori.  She was given antibiotics and took two doses and had severe abdominal

## 2021-12-24 NOTE — ED INITIAL ASSESSMENT (HPI)
Pt admitted 2 weeks ago with abd pain and had biopsy done and was diagnosed with non-hogkins lymphoma. Was also diagnosed with h-pylori that was only told of it yesterday when she called her MD c/o abd pain.  Which she was prescribed PO antibiotic    Pt

## 2021-12-24 NOTE — TELEPHONE ENCOUNTER
Pt. States that she continues to have symptoms, and that she was seen at [facility]. Pt. Is requesting to get a call back.

## 2021-12-25 NOTE — ED PROVIDER NOTES
Patient Seen in: [facility] AND [facility] Emergency Department    History   Patient presents with:  Abdomen/Flank Pain      HPI    The patient presents to the ED complaining of epigastric abdominal pain.   She states this significantly worsened after starting me alcoholism   • Cancer Maternal Grandmother         Cancer-lung, age 43 (cause of death)   • Diabetes Maternal Grandfather    • Other (No hyperlipidemia) Other        Smoking Status: Social History    Socioeconomic History      Marital status:     To She is not in acute distress. Appearance: She is well-developed. She is not ill-appearing. HENT:      Head: Normocephalic and atraumatic. Eyes:      General:         Right eye: No discharge. Left eye: No discharge.       Conjunctiva/sclera: Final result                                                 Please view results for these tests on the individual orders.          Imaging Results Available and Reviewed while in ED:   ED Medications Administered:   Medications   ketorolac (TORADOL) inj department: Stable    Disposition and Plan     Clinical Impression:  Epigastric pain  (primary encounter diagnosis)    Disposition:  Discharge    Follow-up:  Kevin Koch MD  [address] [place] [address] [phone]    Call in 1 day        M

## 2021-12-27 NOTE — TELEPHONE ENCOUNTER
Patient recently in hospital for ab pain--->dx with H.pylori gastritis (on EGD) and CT showed mesenteric mass/ lesion in upper abdomen. 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      She had

## 2021-12-28 RX ORDER — LEVOFLOXACIN 500 MG/1
500 TABLET, FILM COATED ORAL EVERY 24 HOURS
Qty: 14 TABLET | Refills: 0 | Status: SHIPPED | OUTPATIENT
Start: 2021-12-28 | End: 2022-01-04

## 2021-12-28 NOTE — TELEPHONE ENCOUNTER
Discussed with patient--->okay to start therapy for H.pylori. She is reluctant given amoxicillin/clarithromycin caused ab discomfort. Will switch to levofloxacin based therapy as below x14 days. She is OFF rosuvastatin while on abx.  She will message me i

## 2021-12-29 ENCOUNTER — HOSPITAL ENCOUNTER (OUTPATIENT)
Dept: NUCLEAR MEDICINE | Facility: HOSPITAL | Age: 46
Discharge: HOME | End: 2021-12-29
Attending: STUDENT IN AN ORGANIZED HEALTH CARE EDUCATION/TRAINING PROGRAM
Payer: COMMERCIAL

## 2021-12-29 ENCOUNTER — TELEMEDICINE (OUTPATIENT)
Dept: INTERNAL MEDICINE CLINIC | Facility: CLINIC | Age: 46
End: 2021-12-29

## 2021-12-29 DIAGNOSIS — A04.8 HELICOBACTER PYLORI (H. PYLORI) INFECTION: ICD-10-CM

## 2021-12-29 DIAGNOSIS — C85.93 NON-HODGKIN LYMPHOMA OF INTRA-ABDOMINAL LYMPH NODES, UNSPECIFIED NON-HODGKIN LYMPHOMA TYPE (HCC): Primary | ICD-10-CM

## 2021-12-29 DIAGNOSIS — C82.80 OTHER TYPE OF FOLLICULAR LYMPHOMA, UNSPECIFIED BODY REGION (HCC): ICD-10-CM

## 2021-12-29 LAB — GLUCOSE BLD-MCNC: 81 MG/DL (ref 70–99)

## 2021-12-29 PROCEDURE — 78815 PET IMAGE W/CT SKULL-THIGH: CPT | Performed by: STUDENT IN AN ORGANIZED HEALTH CARE EDUCATION/TRAINING PROGRAM

## 2021-12-29 PROCEDURE — 99214 OFFICE O/P EST MOD 30 MIN: CPT | Performed by: INTERNAL MEDICINE

## 2021-12-29 PROCEDURE — 82962 GLUCOSE BLOOD TEST: CPT

## 2021-12-29 NOTE — PROGRESS NOTES
Patient ID: Hattie Pierre is a 51year old female. Patient presents with:  Lymphoma         HISTORY OF PRESENT ILLNESS:   Patient presents for above. This visit is conducted using Telemedicine with live, interactive video and audio.   New patient, zoran Laser cholecystectomy    • Decorative tattoo    • Degenerative lumbar disc 2005    Degenerative L2 disc, Therapy   • H/O pregnancy , , , 2009    EAB x 2-, ;  X 3- 3149, ?, 5744   • Helicobacter positive gastritis 2021 tablets (1,000 mg total) by mouth 2 (two) times daily for 14 days. , Disp: 56 tablet, Rfl: 0  •  HYDROcodone-acetaminophen 5-325 MG Oral Tab, Take 1 tablet by mouth every 6 (six) hours as needed for Pain., Disp: 20 tablet, Rfl: 0  •  rosuvastatin 20 MG Oral Pt has a pacemaker: Not Asked        Pt has a defibrillator: Not Asked        Breast feeding: No        Reaction to local anesthetic: No    Social History Narrative      Not on file    Social Determinants of Health  Financial Resource Strain: Not on file aware of the limitations of the telehealth visit, including treatment limitations as no physical exam could be performed. The patient was advised to call 911 or to go to the ER in case there was an emergency.   The patient was also advised of the potential

## 2021-12-30 ENCOUNTER — OFFICE VISIT (OUTPATIENT)
Dept: HEMATOLOGY/ONCOLOGY | Facility: HOSPITAL | Age: 46
End: 2021-12-30
Attending: INTERNAL MEDICINE
Payer: COMMERCIAL

## 2021-12-30 VITALS
SYSTOLIC BLOOD PRESSURE: 117 MMHG | HEIGHT: 63 IN | DIASTOLIC BLOOD PRESSURE: 69 MMHG | WEIGHT: 198 LBS | HEART RATE: 88 BPM | RESPIRATION RATE: 16 BRPM | BODY MASS INDEX: 35.08 KG/M2 | TEMPERATURE: 98 F | OXYGEN SATURATION: 99 %

## 2021-12-30 DIAGNOSIS — C83.03 SMALL CELL B-CELL LYMPHOMA OF INTRA-ABDOMINAL LYMPH NODES (HCC): Primary | ICD-10-CM

## 2021-12-30 PROCEDURE — 99215 OFFICE O/P EST HI 40 MIN: CPT | Performed by: INTERNAL MEDICINE

## 2021-12-30 NOTE — PROGRESS NOTES
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     States c/o abdominal on the L MG Oral Tab Take 2 tablets (1,000 mg total) by mouth 2 (two) times daily for 14 days. 56 tablet 0   • triamcinolone acetonide 0.1 % External Ointment Apply 1 Application topically 2 (two) times daily.  (Patient taking differently: Apply 1 Application topica Hypertension Mother    • High Cholesterol Mother    • Diabetes Mother         Bord DM   • Cancer Mother 54        Cancer-breast, Mother, Yifan Moreno, diagnosed 2010   • Heart Disorder Mother         coronary stent   • Alcohol and Other Disorders Associate basins seen on the PET/CT. Discussed that the level of activity on PET/CT is more suggestive of a high-grade lymphoma.     Discussed that the therapies for low-grade and high-grade lymphoma are markedly different, as well as he prognosis is different for t Hematocrit      35.0 - 48.0 % 39.9    MCV      80.0 - 100.0 fL 86.2    MCH      26.0 - 34.0 pg 27.9    MCHC      31.0 - 37.0 g/dL 32.3    RDW-SD      35.1 - 46.3 fL 39.1    RDW      11.0 - 15.0 % 12.2    Platelet Count      370.2 - 450.0 10(3)uL 377.0 Case: [id number]                                   Authorizing Provider:  Mychal Joel,   Collected:           12/13/2021 10:35 AM                                  MD Daniela Brewster CD10+ B-cell non-Hodgkin lymphoma with small cell morphology. The differential includes a follicular lymphoma, grade 1-2, and a diffuse variant follicular lymphoma. There is no evidence seen of a large cell transformation.      For  purpose Final Diagnosis:      Random gastric biopsy:   · Multiple fragments of gastric mucosa with moderate chronic active H. Pylori-associated gastritis. · Diff-Quik stain (with appropriate control reactivity) is positive for H. pylori microorganisms. Narrative  PROCEDURE: CT ABDOMEN PELVIS IV CONTRAST NO ORAL (ER)      COMPARISON: MRI ABDOMEN WWO CONTRAST, 10/15/2011, 12:44 PM.  US GALL BLADDER, 10/15/2011, 8:18 AM.      INDICATIONS: Periumbilical abdominal pain.       TECHNIQUE: Multidetector CT im there is an infiltrative hypodensity measuring 2.4 x 2.3 x 2.9 cm. Multiple enlarged mesenteric lymph nodes are demonstrated in the left upper quadrant, increased in conspicuity since 2011. For instance, a   cluster of lymph nodes measures 1.6 x 1.4 cm, 2. Impression  CONCLUSION:   1. There is a nonspecific left upper quadrant mesenteric lesion with surrounding fat stranding. This could reflect neoplastic or reactive/inflammatory process. Further workup is recommended.       2. Indeterminate hypoenhancing a Overlying skin site on the anterior abdominal wall was prepped and draped in sterile fashion.   Utilizing CT guidance, a 19 gauge Hawkin's blunt tip needle   was advanced through the abdominal wall and into the peritoneal cavity, targeting the mass, careful pelvis. [place] SCORE:    5, uptake markedly increased compared to the liver.                        Impression  CONCLUSION:  Markedly elevated activity involving a grouping of lymph nodes left side of the mesentery, and a smaller grouping of lymph n

## 2021-12-31 RX ORDER — OXYCODONE HYDROCHLORIDE AND ACETAMINOPHEN 5; 325 MG/1; MG/1
1 TABLET ORAL EVERY 6 HOURS PRN
Qty: 30 TABLET | Refills: 0 | Status: SHIPPED | OUTPATIENT
Start: 2021-12-31 | End: 2022-01-15

## 2022-01-03 ENCOUNTER — TELEPHONE (OUTPATIENT)
Dept: CASE MANAGEMENT | Age: 47
End: 2022-01-03

## 2022-01-03 ENCOUNTER — TELEPHONE (OUTPATIENT)
Dept: SURGERY | Facility: CLINIC | Age: 47
End: 2022-01-03

## 2022-01-03 DIAGNOSIS — K63.89 MESENTERIC MASS: Primary | ICD-10-CM

## 2022-01-03 NOTE — TELEPHONE ENCOUNTER
Dr. Umair Tran,     Dr. Mehnaz Juarez has referred the patient to Dr. Emre Patricia for consult. Pended referral please review diagnosis and sign off if you agree. Thank you.   Isidro Zacarias

## 2022-01-04 ENCOUNTER — TELEPHONE (OUTPATIENT)
Dept: HEMATOLOGY/ONCOLOGY | Facility: HOSPITAL | Age: 47
End: 2022-01-04

## 2022-01-04 RX ORDER — OMEPRAZOLE 20 MG/1
20 CAPSULE, DELAYED RELEASE ORAL
Qty: 28 CAPSULE | Refills: 0 | Status: CANCELLED | OUTPATIENT
Start: 2022-01-04 | End: 2022-01-18

## 2022-01-04 RX ORDER — AMOXICILLIN 500 MG/1
1000 TABLET, FILM COATED ORAL 2 TIMES DAILY
Qty: 56 TABLET | Refills: 0 | Status: CANCELLED | OUTPATIENT
Start: 2022-01-04 | End: 2022-01-18

## 2022-01-04 RX ORDER — LEVOFLOXACIN 500 MG/1
500 TABLET, FILM COATED ORAL EVERY 24 HOURS
Qty: 14 TABLET | Refills: 0 | OUTPATIENT
Start: 2022-01-04

## 2022-01-04 RX ORDER — ROSUVASTATIN CALCIUM 20 MG/1
20 TABLET, COATED ORAL NIGHTLY
Qty: 30 TABLET | Refills: 0 | Status: SHIPPED | OUTPATIENT
Start: 2022-01-04 | End: 2022-02-03

## 2022-01-04 NOTE — TELEPHONE ENCOUNTER
Dr. Levon Connor--    Patient is running two days short on antibitotics (amoxicillin & levofloxacin) ordered for h pylori treatment. Needs an additional two days to complete the 14 day course due to beginning treatment and than stopping due to symptoms.      P

## 2022-01-04 NOTE — TELEPHONE ENCOUNTER
Levofloxacin, amoxicillin and omeprazole ordered only for 14 day treatment course. No refills indicated.

## 2022-01-04 NOTE — TELEPHONE ENCOUNTER
What is missing on referral? I completed the referral and do not see anything missing.     Thank you,  [facility]   Referral specialist

## 2022-01-04 NOTE — TELEPHONE ENCOUNTER
Patient called stating that a nurse from Dr. Lanie Watson office stated that they received the referral, but it is not complete.  Patient states that she has an appointment 1/05/2022 at 2:30

## 2022-01-04 NOTE — TELEPHONE ENCOUNTER
Missy Maldonado called stating she is having increased pain in her stomach, center above her belly button, worse when she coughs or bends or sits too long, constant,rates as 7-8/10, not controlled with Oxycodone.   Denies fevers, no sob or chest pain, denies nausea,

## 2022-01-05 ENCOUNTER — PATIENT MESSAGE (OUTPATIENT)
Dept: GASTROENTEROLOGY | Facility: CLINIC | Age: 47
End: 2022-01-05

## 2022-01-05 ENCOUNTER — OFFICE VISIT (OUTPATIENT)
Dept: SURGERY | Facility: CLINIC | Age: 47
End: 2022-01-05
Payer: COMMERCIAL

## 2022-01-05 VITALS
RESPIRATION RATE: 16 BRPM | BODY MASS INDEX: 35 KG/M2 | HEART RATE: 84 BPM | DIASTOLIC BLOOD PRESSURE: 80 MMHG | WEIGHT: 200 LBS | SYSTOLIC BLOOD PRESSURE: 125 MMHG | OXYGEN SATURATION: 97 %

## 2022-01-05 DIAGNOSIS — C83.03 SMALL CELL B-CELL LYMPHOMA OF INTRA-ABDOMINAL LYMPH NODES (HCC): Primary | ICD-10-CM

## 2022-01-05 PROCEDURE — 99245 OFF/OP CONSLTJ NEW/EST HI 55: CPT | Performed by: SURGERY

## 2022-01-05 PROCEDURE — 3079F DIAST BP 80-89 MM HG: CPT | Performed by: SURGERY

## 2022-01-05 PROCEDURE — 3074F SYST BP LT 130 MM HG: CPT | Performed by: SURGERY

## 2022-01-05 RX ORDER — LEVOFLOXACIN 500 MG/1
500 TABLET, FILM COATED ORAL EVERY 24 HOURS
Qty: 2 TABLET | Refills: 0 | Status: SHIPPED | OUTPATIENT
Start: 2022-01-05 | End: 2022-01-07

## 2022-01-05 RX ORDER — AMOXICILLIN 500 MG/1
1000 TABLET, FILM COATED ORAL 2 TIMES DAILY
Qty: 8 TABLET | Refills: 0 | Status: SHIPPED | OUTPATIENT
Start: 2022-01-05 | End: 2022-01-07

## 2022-01-05 NOTE — CONSULTS
[facility]    Patient Name:  Robyn Martinez   YOB: 1975   Gender:  Female   Appt Date:  1/5/2022   Provider:  Iggy Lorenzana MD   Insurance:  [facility]     PATIENT PROVIDERS  Referring underwent EGD on 12/15/2021 which was remarkable for diffuse erythema in the stomach. Biopsy was positive for H pylori.  She has been on treatment     Vital Signs:  /80 (BP Location: Left arm, Patient Position: Sitting, Cuff Size: adult)   Pulse 84 insemination/clomid   • Screen for colon cancer 2019    no adenomatous polyp; repeat CLN in 10 yrs   • Tonsillitis     Tonsillectomy      Reviewed:  Past Surgical History:   Procedure Laterality Date   •        X 3, , ?,    • C- Negative for congestion and trouble swallowing. Eyes: Negative for discharge and redness. Respiratory: Negative for cough, chest tightness and shortness of breath. Cardiovascular: Negative for chest pain and leg swelling.    Gastrointestinal: Positi into a extremity vein.  Low dose non-contrast CT scanning was performed using a dedicated integrated PET/CT scanner for attenuation correction and   anatomic localization only.         PATIENT BLOOD GLUCOSE:  10.9 mg/dL.     Injection 1155 hours, scanning 10/15/2011, 12:44 PM.  US GALL BLADDER, 10/15/2011, 8:18 AM.       INDICATIONS: Periumbilical abdominal pain.       TECHNIQUE: Multidetector CT images of the abdomen and pelvis were obtained with non-ionic intravenous contrast material. Automated exposure are demonstrated in the left upper quadrant, increased in conspicuity since 2011. For instance, a   cluster of lymph nodes measures 1.6 x 1.4 cm, 2.0 x 1.8 cm, and 1.6 x 1.5 cm (series 2, image 67).  An additional bilobed conglomeration of lymph nodes in th stranding. This could reflect neoplastic or reactive/inflammatory process. Further workup is recommended.       2. Indeterminate hypoenhancing appearance of the cervix. Correlation with gynecological examination is suggested.       3.  Probable functional l

## 2022-01-05 NOTE — TELEPHONE ENCOUNTER
Thanks [facility] Delio - she should continue either once a day pepcid or omeprazole for at least next 6 months to prevent ulcers.

## 2022-01-05 NOTE — TELEPHONE ENCOUNTER
Dr Ifrah Sears,    I contacted patient to inform her that medications she requested (amoxicillin 500 and levoFLOXacin 500 MG) were refilled to [facility] pharmacy and should be ready for pick,.   Mrs Jean Lino then asked how long is she to continue taking the pepcid, since

## 2022-01-05 NOTE — H&P (VIEW-ONLY)
[facility]    Patient Name:  Bridgett Ortiz   YOB: 1975   Gender:  Female   Appt Date:  1/5/2022   Provider:  Tara Moreira MD   Insurance:  [facility]     PATIENT PROVIDERS  Referring underwent EGD on 12/15/2021 which was remarkable for diffuse erythema in the stomach. Biopsy was positive for H pylori.  She has been on treatment     Vital Signs:  /80 (BP Location: Left arm, Patient Position: Sitting, Cuff Size: adult)   Pulse 84 insemination/clomid   • Screen for colon cancer 2019    no adenomatous polyp; repeat CLN in 10 yrs   • Tonsillitis     Tonsillectomy      Reviewed:  Past Surgical History:   Procedure Laterality Date   •        X 3, , ?,    • C- Negative for congestion and trouble swallowing. Eyes: Negative for discharge and redness. Respiratory: Negative for cough, chest tightness and shortness of breath. Cardiovascular: Negative for chest pain and leg swelling.    Gastrointestinal: Positi into a extremity vein.  Low dose non-contrast CT scanning was performed using a dedicated integrated PET/CT scanner for attenuation correction and   anatomic localization only.         PATIENT BLOOD GLUCOSE:  10.9 mg/dL.     Injection 1155 hours, scanning 10/15/2011, 12:44 PM.  US GALL BLADDER, 10/15/2011, 8:18 AM.       INDICATIONS: Periumbilical abdominal pain.       TECHNIQUE: Multidetector CT images of the abdomen and pelvis were obtained with non-ionic intravenous contrast material. Automated exposure are demonstrated in the left upper quadrant, increased in conspicuity since 2011. For instance, a   cluster of lymph nodes measures 1.6 x 1.4 cm, 2.0 x 1.8 cm, and 1.6 x 1.5 cm (series 2, image 67).  An additional bilobed conglomeration of lymph nodes in th stranding. This could reflect neoplastic or reactive/inflammatory process. Further workup is recommended.       2. Indeterminate hypoenhancing appearance of the cervix. Correlation with gynecological examination is suggested.       3.  Probable functional l

## 2022-01-05 NOTE — TELEPHONE ENCOUNTER
From: Clint Dickey  To: Susan Lundberg MD  Sent: 1/5/2022 12:01 PM CST  Subject: Follow up    So if he would like me to continue Pepcid. I would need a refill or do I just get it over the counter?

## 2022-01-05 NOTE — PATIENT INSTRUCTIONS
Surgery: XI Robot-assisted mesenteric lymph node biopsy, possible open     Date of Surgery: 1/11/2022    Hospital:    [address] [place] [address]   Phone: [phone]    · This is an outpatient procedure.   · Use the pro Marii Price nurse direct line:  [phone]  Monday through Friday 8:30 am to 4:30 pm    For Dr. Gt Price office: [phone]/ Fax: [phone]  After hours you will reach the answering service     [address] [facility]

## 2022-01-05 NOTE — TELEPHONE ENCOUNTER
To: Radu Figueroa      From: Ozzy Davison      Created: 1/5/2022 12:42 PM        Bryan Vazquez,     Pepcid can be bought over the counter at your pharmacy, no prescription is required.     Thank you,  [facility]  Staff  [phone]

## 2022-01-06 DIAGNOSIS — C83.03 SMALL CELL B-CELL LYMPHOMA OF INTRA-ABDOMINAL LYMPH NODES (HCC): Primary | ICD-10-CM

## 2022-01-07 ENCOUNTER — TELEPHONE (OUTPATIENT)
Dept: SURGERY | Facility: CLINIC | Age: 47
End: 2022-01-07

## 2022-01-07 DIAGNOSIS — C83.03 SMALL CELL B-CELL LYMPHOMA OF INTRA-ABDOMINAL LYMPH NODES (HCC): Primary | ICD-10-CM

## 2022-01-07 RX ORDER — OMEPRAZOLE 20 MG/1
20 CAPSULE, DELAYED RELEASE ORAL
COMMUNITY
End: 2022-01-13

## 2022-01-07 NOTE — TELEPHONE ENCOUNTER
Called pt to let her know that her surgery date changed to 1/10/2022. Also called  pre-admission testing to inform them as well.

## 2022-01-08 ENCOUNTER — LAB ENCOUNTER (OUTPATIENT)
Dept: LAB | Facility: HOSPITAL | Age: 47
End: 2022-01-08
Attending: SURGERY
Payer: COMMERCIAL

## 2022-01-08 DIAGNOSIS — Z01.818 PREOP TESTING: ICD-10-CM

## 2022-01-09 LAB — SARS-COV-2 RNA RESP QL NAA+PROBE: NOT DETECTED

## 2022-01-10 ENCOUNTER — ANESTHESIA (OUTPATIENT)
Dept: SURGERY | Facility: HOSPITAL | Age: 47
End: 2022-01-10
Payer: COMMERCIAL

## 2022-01-10 ENCOUNTER — HOSPITAL ENCOUNTER (OUTPATIENT)
Facility: HOSPITAL | Age: 47
Setting detail: HOSPITAL OUTPATIENT SURGERY
Discharge: HOME | End: 2022-01-10
Attending: SURGERY | Admitting: SURGERY
Payer: COMMERCIAL

## 2022-01-10 ENCOUNTER — ANESTHESIA EVENT (OUTPATIENT)
Dept: SURGERY | Facility: HOSPITAL | Age: 47
End: 2022-01-10
Payer: COMMERCIAL

## 2022-01-10 VITALS
HEART RATE: 94 BPM | SYSTOLIC BLOOD PRESSURE: 139 MMHG | RESPIRATION RATE: 18 BRPM | BODY MASS INDEX: 34.15 KG/M2 | WEIGHT: 200 LBS | OXYGEN SATURATION: 97 % | TEMPERATURE: 98 F | DIASTOLIC BLOOD PRESSURE: 63 MMHG | HEIGHT: 64 IN

## 2022-01-10 DIAGNOSIS — Z01.818 PREOP TESTING: ICD-10-CM

## 2022-01-10 DIAGNOSIS — E78.2 MIXED HYPERLIPIDEMIA: ICD-10-CM

## 2022-01-10 DIAGNOSIS — C83.03 SMALL CELL B-CELL LYMPHOMA OF INTRA-ABDOMINAL LYMPH NODES (HCC): Primary | ICD-10-CM

## 2022-01-10 LAB — B-HCG UR QL: NEGATIVE

## 2022-01-10 PROCEDURE — 88185 FLOWCYTOMETRY/TC ADD-ON: CPT | Performed by: PATHOLOGY

## 2022-01-10 PROCEDURE — 88184 FLOWCYTOMETRY/ TC 1 MARKER: CPT | Performed by: SURGERY

## 2022-01-10 PROCEDURE — 88334 PATH CONSLTJ SURG CYTO XM EA: CPT | Performed by: SURGERY

## 2022-01-10 PROCEDURE — 8E0W4CZ ROBOTIC ASSISTED PROCEDURE OF TRUNK REGION, PERCUTANEOUS ENDOSCOPIC APPROACH: ICD-10-PCS | Performed by: SURGERY

## 2022-01-10 PROCEDURE — 88307 TISSUE EXAM BY PATHOLOGIST: CPT | Performed by: SURGERY

## 2022-01-10 PROCEDURE — 88333 PATH CONSLTJ SURG CYTO XM 1: CPT | Performed by: SURGERY

## 2022-01-10 PROCEDURE — 88189 FLOWCYTOMETRY/READ 16 & >: CPT | Performed by: PATHOLOGY

## 2022-01-10 PROCEDURE — 88161 CYTOPATH SMEAR OTHER SOURCE: CPT | Performed by: SURGERY

## 2022-01-10 PROCEDURE — 88342 IMHCHEM/IMCYTCHM 1ST ANTB: CPT | Performed by: SURGERY

## 2022-01-10 PROCEDURE — 88184 FLOWCYTOMETRY/ TC 1 MARKER: CPT | Performed by: PATHOLOGY

## 2022-01-10 PROCEDURE — 81025 URINE PREGNANCY TEST: CPT

## 2022-01-10 PROCEDURE — 88341 IMHCHEM/IMCYTCHM EA ADD ANTB: CPT | Performed by: SURGERY

## 2022-01-10 PROCEDURE — 07BB4ZX EXCISION OF MESENTERIC LYMPHATIC, PERCUTANEOUS ENDOSCOPIC APPROACH, DIAGNOSTIC: ICD-10-PCS | Performed by: SURGERY

## 2022-01-10 RX ORDER — MORPHINE SULFATE 10 MG/ML
6 INJECTION, SOLUTION INTRAMUSCULAR; INTRAVENOUS EVERY 10 MIN PRN
Status: DISCONTINUED | OUTPATIENT
Start: 2022-01-10 | End: 2022-01-10

## 2022-01-10 RX ORDER — HYDROMORPHONE HYDROCHLORIDE 1 MG/ML
0.2 INJECTION, SOLUTION INTRAMUSCULAR; INTRAVENOUS; SUBCUTANEOUS EVERY 5 MIN PRN
Status: DISCONTINUED | OUTPATIENT
Start: 2022-01-10 | End: 2022-01-10

## 2022-01-10 RX ORDER — ACETAMINOPHEN 10 MG/ML
1000 INJECTION, SOLUTION INTRAVENOUS ONCE
Status: COMPLETED | OUTPATIENT
Start: 2022-01-10 | End: 2022-01-10

## 2022-01-10 RX ORDER — AMOXICILLIN 500 MG/1
500 CAPSULE ORAL 2 TIMES DAILY
COMMUNITY
End: 2022-01-21

## 2022-01-10 RX ORDER — FAMOTIDINE 20 MG/1
20 TABLET ORAL ONCE
Status: DISCONTINUED | OUTPATIENT
Start: 2022-01-10 | End: 2022-01-10 | Stop reason: HOSPADM

## 2022-01-10 RX ORDER — MORPHINE SULFATE 4 MG/ML
2 INJECTION, SOLUTION INTRAMUSCULAR; INTRAVENOUS EVERY 10 MIN PRN
Status: DISCONTINUED | OUTPATIENT
Start: 2022-01-10 | End: 2022-01-10

## 2022-01-10 RX ORDER — NALOXONE HYDROCHLORIDE 0.4 MG/ML
80 INJECTION, SOLUTION INTRAMUSCULAR; INTRAVENOUS; SUBCUTANEOUS AS NEEDED
Status: DISCONTINUED | OUTPATIENT
Start: 2022-01-10 | End: 2022-01-10

## 2022-01-10 RX ORDER — HYDROCODONE BITARTRATE AND ACETAMINOPHEN 5; 325 MG/1; MG/1
2 TABLET ORAL AS NEEDED
Status: DISCONTINUED | OUTPATIENT
Start: 2022-01-10 | End: 2022-01-10

## 2022-01-10 RX ORDER — CEFAZOLIN SODIUM/WATER 2 G/20 ML
2 SYRINGE (ML) INTRAVENOUS ONCE
Status: COMPLETED | OUTPATIENT
Start: 2022-01-10 | End: 2022-01-10

## 2022-01-10 RX ORDER — LEVOFLOXACIN 500 MG/1
500 TABLET, FILM COATED ORAL DAILY
COMMUNITY
End: 2022-01-21

## 2022-01-10 RX ORDER — ROCURONIUM BROMIDE 10 MG/ML
INJECTION, SOLUTION INTRAVENOUS AS NEEDED
Status: DISCONTINUED | OUTPATIENT
Start: 2022-01-10 | End: 2022-01-10 | Stop reason: SURG

## 2022-01-10 RX ORDER — MORPHINE SULFATE 4 MG/ML
4 INJECTION, SOLUTION INTRAMUSCULAR; INTRAVENOUS EVERY 10 MIN PRN
Status: DISCONTINUED | OUTPATIENT
Start: 2022-01-10 | End: 2022-01-10

## 2022-01-10 RX ORDER — GLYCOPYRROLATE 0.2 MG/ML
INJECTION, SOLUTION INTRAMUSCULAR; INTRAVENOUS AS NEEDED
Status: DISCONTINUED | OUTPATIENT
Start: 2022-01-10 | End: 2022-01-10 | Stop reason: SURG

## 2022-01-10 RX ORDER — LIDOCAINE HYDROCHLORIDE 10 MG/ML
INJECTION, SOLUTION EPIDURAL; INFILTRATION; INTRACAUDAL; PERINEURAL AS NEEDED
Status: DISCONTINUED | OUTPATIENT
Start: 2022-01-10 | End: 2022-01-10 | Stop reason: SURG

## 2022-01-10 RX ORDER — HYDROCODONE BITARTRATE AND ACETAMINOPHEN 5; 325 MG/1; MG/1
1 TABLET ORAL AS NEEDED
Status: DISCONTINUED | OUTPATIENT
Start: 2022-01-10 | End: 2022-01-10

## 2022-01-10 RX ORDER — PROCHLORPERAZINE EDISYLATE 5 MG/ML
5 INJECTION INTRAMUSCULAR; INTRAVENOUS ONCE AS NEEDED
Status: COMPLETED | OUTPATIENT
Start: 2022-01-10 | End: 2022-01-10

## 2022-01-10 RX ORDER — DEXAMETHASONE SODIUM PHOSPHATE 4 MG/ML
VIAL (ML) INJECTION AS NEEDED
Status: DISCONTINUED | OUTPATIENT
Start: 2022-01-10 | End: 2022-01-10 | Stop reason: SURG

## 2022-01-10 RX ORDER — ONDANSETRON 2 MG/ML
4 INJECTION INTRAMUSCULAR; INTRAVENOUS ONCE AS NEEDED
Status: DISCONTINUED | OUTPATIENT
Start: 2022-01-10 | End: 2022-01-10

## 2022-01-10 RX ORDER — ONDANSETRON 2 MG/ML
INJECTION INTRAMUSCULAR; INTRAVENOUS AS NEEDED
Status: DISCONTINUED | OUTPATIENT
Start: 2022-01-10 | End: 2022-01-10 | Stop reason: SURG

## 2022-01-10 RX ORDER — NEOSTIGMINE METHYLSULFATE 1 MG/ML
INJECTION INTRAVENOUS AS NEEDED
Status: DISCONTINUED | OUTPATIENT
Start: 2022-01-10 | End: 2022-01-10 | Stop reason: SURG

## 2022-01-10 RX ORDER — HYDROMORPHONE HYDROCHLORIDE 1 MG/ML
0.6 INJECTION, SOLUTION INTRAMUSCULAR; INTRAVENOUS; SUBCUTANEOUS EVERY 5 MIN PRN
Status: DISCONTINUED | OUTPATIENT
Start: 2022-01-10 | End: 2022-01-10

## 2022-01-10 RX ORDER — TRAMADOL HYDROCHLORIDE 50 MG/1
TABLET ORAL EVERY 4 HOURS PRN
Qty: 20 TABLET | Refills: 0 | Status: SHIPPED | OUTPATIENT
Start: 2022-01-10 | End: 2022-01-21

## 2022-01-10 RX ORDER — HEPARIN SODIUM 5000 [USP'U]/ML
5000 INJECTION, SOLUTION INTRAVENOUS; SUBCUTANEOUS ONCE
Status: COMPLETED | OUTPATIENT
Start: 2022-01-10 | End: 2022-01-10

## 2022-01-10 RX ORDER — ACETAMINOPHEN 500 MG
1000 TABLET ORAL ONCE
Status: COMPLETED | OUTPATIENT
Start: 2022-01-10 | End: 2022-01-10

## 2022-01-10 RX ORDER — SODIUM CHLORIDE, SODIUM LACTATE, POTASSIUM CHLORIDE, CALCIUM CHLORIDE 600; 310; 30; 20 MG/100ML; MG/100ML; MG/100ML; MG/100ML
INJECTION, SOLUTION INTRAVENOUS CONTINUOUS
Status: DISCONTINUED | OUTPATIENT
Start: 2022-01-10 | End: 2022-01-10

## 2022-01-10 RX ORDER — METOCLOPRAMIDE 10 MG/1
10 TABLET ORAL ONCE
Status: COMPLETED | OUTPATIENT
Start: 2022-01-10 | End: 2022-01-10

## 2022-01-10 RX ORDER — CEFOXITIN 2 G/1
2 INJECTION, POWDER, FOR SOLUTION INTRAVENOUS
Status: DISCONTINUED | OUTPATIENT
Start: 2022-01-10 | End: 2022-01-10 | Stop reason: HOSPADM

## 2022-01-10 RX ORDER — HYDROMORPHONE HYDROCHLORIDE 1 MG/ML
0.4 INJECTION, SOLUTION INTRAMUSCULAR; INTRAVENOUS; SUBCUTANEOUS EVERY 5 MIN PRN
Status: DISCONTINUED | OUTPATIENT
Start: 2022-01-10 | End: 2022-01-10

## 2022-01-10 RX ADMIN — SODIUM CHLORIDE, SODIUM LACTATE, POTASSIUM CHLORIDE, CALCIUM CHLORIDE: 600; 310; 30; 20 INJECTION, SOLUTION INTRAVENOUS at 16:19:00

## 2022-01-10 RX ADMIN — CEFAZOLIN SODIUM/WATER 2 G: 2 G/20 ML SYRINGE (ML) INTRAVENOUS at 14:35:00

## 2022-01-10 RX ADMIN — DEXAMETHASONE SODIUM PHOSPHATE 4 MG: 4 MG/ML VIAL (ML) INJECTION at 14:29:00

## 2022-01-10 RX ADMIN — ROCURONIUM BROMIDE 10 MG: 10 INJECTION, SOLUTION INTRAVENOUS at 16:05:00

## 2022-01-10 RX ADMIN — ROCURONIUM BROMIDE 10 MG: 10 INJECTION, SOLUTION INTRAVENOUS at 14:19:00

## 2022-01-10 RX ADMIN — GLYCOPYRROLATE 0.6 MG: 0.2 INJECTION, SOLUTION INTRAMUSCULAR; INTRAVENOUS at 16:13:00

## 2022-01-10 RX ADMIN — ROCURONIUM BROMIDE 10 MG: 10 INJECTION, SOLUTION INTRAVENOUS at 15:47:00

## 2022-01-10 RX ADMIN — ROCURONIUM BROMIDE 40 MG: 10 INJECTION, SOLUTION INTRAVENOUS at 14:29:00

## 2022-01-10 RX ADMIN — ONDANSETRON 4 MG: 2 INJECTION INTRAMUSCULAR; INTRAVENOUS at 14:29:00

## 2022-01-10 RX ADMIN — ONDANSETRON 4 MG: 2 INJECTION INTRAMUSCULAR; INTRAVENOUS at 16:35:00

## 2022-01-10 RX ADMIN — NEOSTIGMINE METHYLSULFATE 5 MG: 1 INJECTION INTRAVENOUS at 16:13:00

## 2022-01-10 RX ADMIN — LIDOCAINE HYDROCHLORIDE 50 MG: 10 INJECTION, SOLUTION EPIDURAL; INFILTRATION; INTRACAUDAL; PERINEURAL at 14:19:00

## 2022-01-10 NOTE — INTERVAL H&P NOTE
Patient seen and examined. No changes to the attached history and physical are noted. 51year old female presenting today for planned excision mesenteric node.     Bere Cervantes MD  [facility] General Surgical Oncology  [place] [id number]  Pager [phone]

## 2022-01-10 NOTE — BRIEF OP NOTE
Pre-Operative Diagnosis: 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 (HCC) [C83.03]     Post-Operative Diagnosis: 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 ([address] [C83.03]      Procedure Performed:   XI Robot-assisted mesenteri

## 2022-01-10 NOTE — ANESTHESIA PREPROCEDURE EVALUATION
Anesthesia PreOp Note    HPI:     Meryl Domínguez is a 51year old female who presents for preoperative consultation requested by: Santosh Henley MD    Date of Surgery: 1/10/2022    Procedure(s):  XI Robot-assisted mesenteric lymph node biopsy, po Cholecystitis     Laser cholecystectomy 2011   • Decorative tattoo    • Degenerative lumbar disc 2005    Degenerative L2 disc, Therapy   • H/O pregnancy , , , 2009    EAB x 2-, ;  X 3- 822, ?, 4013   • Helicobacter positive 2 g, Intravenous, Once, Marisela Hagan MD    No current [facility]-ordered outpatient medications on file.         Latex                   RASH, ITCHING    Comment:redness    Family History   Problem Relation Age of Onset   • Diabetes Father 72   • Obesity Fathe Grew up on a farm: Not Asked        History of tanning: Yes        Outdoor occupation: Not Asked        Pt has a pacemaker: Not Asked        Pt has a defibrillator: Not Asked        Breast feeding: No        Reaction to local anesthetic: No    Social Histo negative ROS     Endo/Other - negative ROS   Abdominal  - normal exam  (+) obese,                Anesthesia Plan:   ASA:  2  Plan:   General  Airway:  ETT  Post-op Pain Management: IV analgesics  Informed Consent Plan and Risks Discussed With:  Patient  Radhajusto Hernandezton

## 2022-01-10 NOTE — ANESTHESIA PROCEDURE NOTES
Peripheral IV  Date/Time: 1/10/2022 2:26 PM  Inserted by: Daria Harmon CRNA    Placement  Needle size: 18 G  Laterality: right  Location: hand  Local anesthetic: none  Site prep: alcohol  Technique: anatomical landmarks  Attempts: 1

## 2022-01-10 NOTE — ANESTHESIA PROCEDURE NOTES
Airway  Date/Time: 1/10/2022 2:23 PM  Urgency: Elective    Airway not difficult    General Information and Staff    Patient location during procedure: OR  Anesthesiologist: Mandy Montez MD  Resident/CRNA: Ty Dickson CRNA  Performed: CHEY Pearce

## 2022-01-11 ENCOUNTER — TELEPHONE (OUTPATIENT)
Dept: SURGERY | Facility: CLINIC | Age: 47
End: 2022-01-11

## 2022-01-11 NOTE — OPERATIVE REPORT
Date of operation 1/11/2022    Preoperative diagnosis:  1. Intra-abdominal lymphadenopathy, PET avid    Operation performed:  1.  Robotic assisted mesenteric lymph node excisional biopsy    Postoperative diagnosis:  1. Same    Operating Surgeon:  1.   Fad Dissection was then undertaken around the lymph nodes with hook cautery and bipolar vessel sealer as deemed appropriate. Dissection was carried circumferentially around the first lymph node. I then tackled the second lymph node.   This was very friable an

## 2022-01-11 NOTE — TELEPHONE ENCOUNTER
Called pt to check on her after her procedure with Dr. Devi, pt doing well over all. Pt's pain is controlled with Tylenol, pt not using Tramadol. Pt just c/o slight abdominal bloating and tightness which seems to be improving today.  No fevers, chills o

## 2022-01-12 ENCOUNTER — TELEPHONE (OUTPATIENT)
Dept: HEMATOLOGY/ONCOLOGY | Facility: HOSPITAL | Age: 47
End: 2022-01-12

## 2022-01-12 ENCOUNTER — TELEPHONE (OUTPATIENT)
Dept: SURGERY | Facility: CLINIC | Age: 47
End: 2022-01-12

## 2022-01-12 NOTE — TELEPHONE ENCOUNTER
Called and discussed path in general terms, will communicate with Dr Denver Beard. Doing well. Mary Duval MD  Complex General Surgical Oncology  [facility]  Pager [phone]  [email or web address]

## 2022-01-12 NOTE — TELEPHONE ENCOUNTER
Returned phone call. Patient rescheduled appointment to see Dr. Robert Mendez to discuss pathology results.

## 2022-01-13 ENCOUNTER — PATIENT MESSAGE (OUTPATIENT)
Dept: GASTROENTEROLOGY | Facility: CLINIC | Age: 47
End: 2022-01-13

## 2022-01-13 ENCOUNTER — OFFICE VISIT (OUTPATIENT)
Dept: HEMATOLOGY/ONCOLOGY | Facility: HOSPITAL | Age: 47
End: 2022-01-13
Attending: INTERNAL MEDICINE
Payer: COMMERCIAL

## 2022-01-13 VITALS
HEIGHT: 63 IN | SYSTOLIC BLOOD PRESSURE: 139 MMHG | RESPIRATION RATE: 16 BRPM | HEART RATE: 93 BPM | BODY MASS INDEX: 35.12 KG/M2 | OXYGEN SATURATION: 98 % | WEIGHT: 198.19 LBS | TEMPERATURE: 98 F | DIASTOLIC BLOOD PRESSURE: 88 MMHG

## 2022-01-13 DIAGNOSIS — C82.13 FOLLICULAR LYMPHOMA GRADE II OF INTRA-ABDOMINAL LYMPH NODES (HCC): Primary | ICD-10-CM

## 2022-01-13 PROBLEM — Z45.2 ENCOUNTER FOR CENTRAL LINE CARE: Status: ACTIVE | Noted: 2022-01-13

## 2022-01-13 LAB
CD10 CELLS NFR SPEC: 11 %
CD11C CELLS NFR SPEC: 6 %
CD14 CELLS NFR SPEC: 1 %
CD19 CELLS NFR SPEC: 25 %
CD19/CD10 CELLS: 10 %
CD20 CELLS NFR SPEC: 28 %
CD22 CELLS NFR SPEC: 26 %
CD23 CELLS NFR SPEC: <1 %
CD3 CELLS NFR SPEC: 70 %
CD3+CD4+ CELLS NFR SPEC: 46 %
CD3+CD4+ CELLS/CD3+CD8+ CLL SPEC: 2
CD3+CD8+ CELLS NFR SPEC: 23 %
CD45 CELLS NFR SPEC: 100 %
CD5 CELLS NFR SPEC: 71 %
CD5/CD19 CELLS: 2 %
CD56 CELLS NFR SPEC: 2 %
CD7 CELLS NFR SPEC: 55 %
CELL SURF KAPPA/LAMBDA RATIO: 0.4
CELL SURF LAMBDA LIGHT CHAIN: 16 %
CELL SURFACE KAPPA LIGHT CHAIN: 7 %
FMC7 CELLS NFR SPEC: 18 %

## 2022-01-13 PROCEDURE — 99215 OFFICE O/P EST HI 40 MIN: CPT | Performed by: INTERNAL MEDICINE

## 2022-01-13 RX ORDER — OMEPRAZOLE 20 MG/1
20 CAPSULE, DELAYED RELEASE ORAL
Qty: 180 CAPSULE | Refills: 0 | Status: SHIPPED | OUTPATIENT
Start: 2022-01-13 | End: 2022-04-13

## 2022-01-13 RX ORDER — SODIUM CHLORIDE 9 MG/ML
10 INJECTION INTRAVENOUS ONCE
OUTPATIENT
Start: 2022-01-13

## 2022-01-13 RX ORDER — HEPARIN SODIUM (PORCINE) LOCK FLUSH IV SOLN 100 UNIT/ML 100 UNIT/ML
5 SOLUTION INTRAVENOUS ONCE
OUTPATIENT
Start: 2022-01-13

## 2022-01-13 NOTE — PROGRESS NOTES
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  On abx   • High cholesterol    • Hormone disorder    • Hyperlipidemia    • Infertility     Art insemination   • Lipid screening 3/10/2014    per NG   • Obesity, unspecified    • Polycystic ovaries 2008 lb)    Physical Exam  Deferred.       ASSESSMENT/PLAN:   Follicular lymphoma grade II of intra-abdominal lymph nodes (HCC)  (primary encounter diagnosis)  Cancer Staging  Follicular lymphoma grade II of intra-abdominal lymph nodes ([facility] [address]  Staging form: Piuysh detail. These were briefly discussed with the patient. Written information provided for these 2 agents. Discussed with the patient that after completion of 3 cycles she will have a PET/CT, and again after cycle 6.   Pending on final response, she be a ca Operating Room                                                                Pathologist:           Chris Grissom MD                                                              Specimen:    Lymph node, 1. mesenteric lymph node ti and bright surface lambda immunoglobulin light chain restriction. The morphological, immunohistochemical and flow cytometric findings support the diagnosis of follicular lymphoma, Grade 1-2.  No evidence of a transformation to a large-cell lymphoma is

## 2022-01-13 NOTE — TELEPHONE ENCOUNTER
Dr. Halie Garcia,    Patient requesting prescription for Omeprazole 20 mg be sent to pharmacy. States she has been taking this and not Pepcid. Orders pended, please review and sign if appropriate, thank you!

## 2022-01-13 NOTE — PATIENT INSTRUCTIONS
Rituximab Injection 10 mg/mL  Uses  This medicine is used for the following purposes:  · arthritis  · inflammation of blood vessels  · skin disease  · cancer  Instructions  This is an IV medicine.  It is given through a sterile tube directly into the ve swelling, or severe dizziness. This medicine is associated with a rare but very serious medical condition. Please speak with your doctor about symptoms you should look out for while on this medicine.  Notify your doctor immediately if you develop those sym this medicine. This medicine can pass through breast milk to the baby. Do not use this medicine if you are pregnant. If you become pregnant while on this medicine, contact your doctor immediately. This medicine can hurt a new baby in the womb.  If you bec in the abdomen  · loss of balance  · bleeding that is severe or takes longer to stop  · blurry vision  · wheezing or difficulty breathing  · unusual bruising or discoloration on skin  · chest pain  · changes in memory, mood, or thinking  · confusion  · cou pharmacist may give you other documents about your medicine. Please talk to them if you have any questions. Always follow their advice. There is a more complete description of this medicine available in [place]. Scan this code on your smartphone or tablet or hours. Cautions  Tell your doctor and pharmacist if you ever had an allergic reaction to a medicine. Symptoms of an allergic reaction can include trouble breathing, skin rash, itching, swelling, or severe dizziness. May cause mouth sores.  Brush teeth gen medicine may reduce your body's ability to fight infections. Try to avoid contact with people with colds, flu or other infections. Contact your doctor if you develop any signs of a new infection such as fever, cough, sore throat, or chills.   Wash your casper prescribed this medicine. Side Effects  The following is a list of some common side effects from this medicine. Please speak with your doctor about what you should do if you experience these or other side effects.   · diarrhea  · dizziness  · lack of energ more complete description of this medicine available in [place]. Scan this code on your smartphone or tablet or use the web address below. You can also ask your pharmacist for a printout.  If you have any questions, please ask your pharmacist.     © 2021 Gil Contreras

## 2022-01-13 NOTE — TELEPHONE ENCOUNTER
To: Juarez Broderick      From: Rickey Watson      Created: 1/13/2022 1:10 PM        Dr. Levon Pereira refilled your prescription for omeprazole.  He would like for you to schedule an office visit with either him or his nurse practitioner Anaya Salmeron

## 2022-01-13 NOTE — TELEPHONE ENCOUNTER
From: [facility]  To: Deuce Negro MD  Sent: 1/13/2022 11:14 AM CST  Subject: Response    I was never taking Pepcid. ...just Omeprazole 2x/day.

## 2022-01-13 NOTE — TELEPHONE ENCOUNTER
GI staff:please have patient make appointment to see me or Sallyann Saint in clinic in the next 1 month. This medication has been refilled for now, but we cannot provide future refills on medications unless he is seen in clinic for follow-up and monitoring.  Than

## 2022-01-18 ENCOUNTER — HOSPITAL ENCOUNTER (OUTPATIENT)
Dept: MAMMOGRAPHY | Facility: HOSPITAL | Age: 47
Discharge: HOME | End: 2022-01-18
Attending: OBSTETRICS & GYNECOLOGY
Payer: COMMERCIAL

## 2022-01-18 DIAGNOSIS — Z12.31 SCREENING MAMMOGRAM FOR BREAST CANCER: ICD-10-CM

## 2022-01-18 PROCEDURE — 77067 SCR MAMMO BI INCL CAD: CPT | Performed by: OBSTETRICS & GYNECOLOGY

## 2022-01-18 PROCEDURE — 77063 BREAST TOMOSYNTHESIS BI: CPT | Performed by: OBSTETRICS & GYNECOLOGY

## 2022-01-19 DIAGNOSIS — C82.13 FOLLICULAR LYMPHOMA GRADE II OF INTRA-ABDOMINAL LYMPH NODES (HCC): Primary | ICD-10-CM

## 2022-01-19 DIAGNOSIS — Z51.81 MEDICATION MONITORING ENCOUNTER: ICD-10-CM

## 2022-01-20 NOTE — PATIENT INSTRUCTIONS
Medication Education Record: IV Therapy    Learner:  Patient    Barriers / Limitations:  None    Psychosocial Assessment:  patient psychosocial response appropriate    Diagnosis: Follicular lymphoma    IV Cancer Treatment Name(s): Bendamustine (Day 1 and 2 (Compazine) 10mg every 8 hours  Take as needed and Ondansetron (Zofran) 8 mg every 8 hours  Take as needed    Helpful hints during cancer treatment:    Diet:  o Avoid greasy or spicy foods on days surrounding chemotherapy  o Eat small frequent meals per [employer] to the sun. Re-apply every 2 hours if in the sun and after bathing or sweating.   o For dry skin, use an alcohol-free lotion twice per day, especially after baths.  o If scalp hair loss has occurred, protect the scalp from the sun by wearing a hat and use the clinic or at home, the following precautions must be taken to lessen any exposure to the medication. Handling Body Waste:   1. Caregivers must wear gloves if exposed to the patient’s blood, urine, stool or vomit.   Dispose of the used gloves after e addition, menstrual cycles can become irregular during and after treatment, so you may not know if you are at a time in your cycle when you could become pregnant or if you are actually pregnant.

## 2022-01-21 ENCOUNTER — OFFICE VISIT (OUTPATIENT)
Dept: HEMATOLOGY/ONCOLOGY | Facility: HOSPITAL | Age: 47
End: 2022-01-21
Attending: NURSE PRACTITIONER
Payer: COMMERCIAL

## 2022-01-21 DIAGNOSIS — C82.13 FOLLICULAR LYMPHOMA GRADE II OF INTRA-ABDOMINAL LYMPH NODES (HCC): ICD-10-CM

## 2022-01-21 DIAGNOSIS — C82.13 FOLLICULAR LYMPHOMA GRADE II OF INTRA-ABDOMINAL LYMPH NODES (HCC): Primary | ICD-10-CM

## 2022-01-21 DIAGNOSIS — Z45.2 ENCOUNTER FOR CENTRAL LINE CARE: ICD-10-CM

## 2022-01-21 DIAGNOSIS — Z51.81 MEDICATION MONITORING ENCOUNTER: ICD-10-CM

## 2022-01-21 LAB
ALBUMIN SERPL-MCNC: 3.5 G/DL (ref 3.4–5)
ALBUMIN/GLOB SERPL: 0.9 {RATIO} (ref 1–2)
ALP LIVER SERPL-CCNC: 90 U/L
ALT SERPL-CCNC: 39 U/L
ANION GAP SERPL CALC-SCNC: 4 MMOL/L (ref 0–18)
AST SERPL-CCNC: 20 U/L (ref 15–37)
BASOPHILS # BLD AUTO: 0.02 X10(3) UL (ref 0–0.2)
BASOPHILS NFR BLD AUTO: 0.3 %
BILIRUB SERPL-MCNC: 0.5 MG/DL (ref 0.1–2)
BUN BLD-MCNC: 13 MG/DL (ref 7–18)
BUN/CREAT SERPL: 16.5 (ref 10–20)
CALCIUM BLD-MCNC: 8.9 MG/DL (ref 8.5–10.1)
CHLORIDE SERPL-SCNC: 106 MMOL/L (ref 98–112)
CO2 SERPL-SCNC: 28 MMOL/L (ref 21–32)
CREAT BLD-MCNC: 0.79 MG/DL
DEPRECATED RDW RBC AUTO: 39.1 FL (ref 35.1–46.3)
EOSINOPHIL # BLD AUTO: 0.22 X10(3) UL (ref 0–0.7)
EOSINOPHIL NFR BLD AUTO: 3.2 %
ERYTHROCYTE [DISTWIDTH] IN BLOOD BY AUTOMATED COUNT: 12.2 % (ref 11–15)
FASTING STATUS PATIENT QL REPORTED: NO
GLOBULIN PLAS-MCNC: 4.1 G/DL (ref 2.8–4.4)
GLUCOSE BLD-MCNC: 131 MG/DL (ref 70–99)
HCT VFR BLD AUTO: 44.6 %
HGB BLD-MCNC: 13.9 G/DL
IMM GRANULOCYTES # BLD AUTO: 0.02 X10(3) UL (ref 0–1)
IMM GRANULOCYTES NFR BLD: 0.3 %
LYMPHOCYTES # BLD AUTO: 2.39 X10(3) UL (ref 1–4)
LYMPHOCYTES NFR BLD AUTO: 34.8 %
MCH RBC QN AUTO: 27.1 PG (ref 26–34)
MCHC RBC AUTO-ENTMCNC: 31.2 G/DL (ref 31–37)
MCV RBC AUTO: 87.1 FL
MONOCYTES # BLD AUTO: 0.66 X10(3) UL (ref 0.1–1)
MONOCYTES NFR BLD AUTO: 9.6 %
NEUTROPHILS # BLD AUTO: 3.56 X10 (3) UL (ref 1.5–7.7)
NEUTROPHILS # BLD AUTO: 3.56 X10(3) UL (ref 1.5–7.7)
NEUTROPHILS NFR BLD AUTO: 51.8 %
OSMOLALITY SERPL CALC.SUM OF ELEC: 288 MOSM/KG (ref 275–295)
PLATELET # BLD AUTO: 368 10(3)UL (ref 150–450)
POTASSIUM SERPL-SCNC: 4.2 MMOL/L (ref 3.5–5.1)
PROT SERPL-MCNC: 7.6 G/DL (ref 6.4–8.2)
RBC # BLD AUTO: 5.12 X10(6)UL
SODIUM SERPL-SCNC: 138 MMOL/L (ref 136–145)
WBC # BLD AUTO: 6.9 X10(3) UL (ref 4–11)

## 2022-01-21 PROCEDURE — 99215 OFFICE O/P EST HI 40 MIN: CPT | Performed by: NURSE PRACTITIONER

## 2022-01-21 PROCEDURE — 36415 COLL VENOUS BLD VENIPUNCTURE: CPT

## 2022-01-21 PROCEDURE — 85025 COMPLETE CBC W/AUTO DIFF WBC: CPT

## 2022-01-21 PROCEDURE — 80053 COMPREHEN METABOLIC PANEL: CPT

## 2022-01-21 RX ORDER — PROCHLORPERAZINE MALEATE 10 MG
10 TABLET ORAL EVERY 8 HOURS PRN
Qty: 30 TABLET | Refills: 3 | Status: SHIPPED | OUTPATIENT
Start: 2022-01-21

## 2022-01-21 RX ORDER — LIDOCAINE AND PRILOCAINE 25; 25 MG/G; MG/G
CREAM TOPICAL
Qty: 5 G | Refills: 1 | Status: SHIPPED | OUTPATIENT
Start: 2022-01-21

## 2022-01-21 RX ORDER — SODIUM CHLORIDE 9 MG/ML
INJECTION, SOLUTION INTRAVENOUS CONTINUOUS
Status: CANCELLED | OUTPATIENT
Start: 2022-01-21

## 2022-01-21 RX ORDER — ONDANSETRON HYDROCHLORIDE 8 MG/1
8 TABLET, FILM COATED ORAL EVERY 8 HOURS PRN
Qty: 30 TABLET | Refills: 3 | Status: SHIPPED | OUTPATIENT
Start: 2022-01-21

## 2022-01-21 NOTE — PROGRESS NOTES
Medication Education Record: IV Therapy    Learner:  Patient    Barriers / Limitations:  None    Psychosocial Assessment:  patient psychosocial response appropriate    Diagnosis: Follicular lymphoma    IV Cancer Treatment Name(s): Bendamustine (Day 1 and 2 (Compazine) 10mg every 8 hours  Take as needed and Ondansetron (Zofran) 8 mg every 8 hours  Take as needed    Helpful hints during cancer treatment:    Diet:  o Avoid greasy or spicy foods on days surrounding chemotherapy  o Eat small frequent meals per [employer] to the sun. Re-apply every 2 hours if in the sun and after bathing or sweating.   o For dry skin, use an alcohol-free lotion twice per day, especially after baths.  o If scalp hair loss has occurred, protect the scalp from the sun by wearing a hat and use the clinic or at home, the following precautions must be taken to lessen any exposure to the medication. Handling Body Waste:   1. Caregivers must wear gloves if exposed to the patient’s blood, urine, stool or vomit.   Dispose of the used gloves after e addition, menstrual cycles can become irregular during and after treatment, so you may not know if you are at a time in your cycle when you could become pregnant or if you are actually pregnant. Pt here today with  for chemotherapy education.  Benrardo Francois

## 2022-01-22 ENCOUNTER — LAB ENCOUNTER (OUTPATIENT)
Dept: LAB | Facility: HOSPITAL | Age: 47
End: 2022-01-22
Attending: RADIOLOGY
Payer: COMMERCIAL

## 2022-01-22 DIAGNOSIS — Z01.818 PRE-OP TESTING: ICD-10-CM

## 2022-01-23 LAB — SARS-COV-2 RNA RESP QL NAA+PROBE: DETECTED

## 2022-01-24 ENCOUNTER — TELEPHONE (OUTPATIENT)
Dept: HEMATOLOGY/ONCOLOGY | Facility: HOSPITAL | Age: 47
End: 2022-01-24

## 2022-01-24 NOTE — TELEPHONE ENCOUNTER
Patient calling to advise she has tested positive with Covid. Port placement has been deferred until  2/2/2022.   Asking if she can start her chemo a week later  2/10/2022    Plz advise

## 2022-01-25 ENCOUNTER — HOSPITAL ENCOUNTER (OUTPATIENT)
Dept: INTERVENTIONAL RADIOLOGY/VASCULAR | Facility: HOSPITAL | Age: 47
Discharge: HOME | End: 2022-01-25
Attending: INTERNAL MEDICINE
Payer: COMMERCIAL

## 2022-01-25 DIAGNOSIS — Z01.818 PRE-OP TESTING: Primary | ICD-10-CM

## 2022-01-29 RX ORDER — ROSUVASTATIN CALCIUM 20 MG/1
TABLET, COATED ORAL
Qty: 30 TABLET | Refills: 0 | OUTPATIENT
Start: 2022-01-29

## 2022-02-02 ENCOUNTER — HOSPITAL ENCOUNTER (OUTPATIENT)
Dept: INTERVENTIONAL RADIOLOGY/VASCULAR | Facility: HOSPITAL | Age: 47
Discharge: HOME | End: 2022-02-02
Attending: INTERNAL MEDICINE | Admitting: RADIOLOGY
Payer: COMMERCIAL

## 2022-02-02 VITALS
WEIGHT: 198 LBS | TEMPERATURE: 99 F | RESPIRATION RATE: 14 BRPM | BODY MASS INDEX: 35 KG/M2 | SYSTOLIC BLOOD PRESSURE: 112 MMHG | DIASTOLIC BLOOD PRESSURE: 70 MMHG | HEART RATE: 63 BPM | OXYGEN SATURATION: 94 %

## 2022-02-02 DIAGNOSIS — C82.13 FOLLICULAR LYMPHOMA GRADE II OF INTRA-ABDOMINAL LYMPH NODES (HCC): ICD-10-CM

## 2022-02-02 PROCEDURE — 36561 INSERT TUNNELED CV CATH: CPT

## 2022-02-02 PROCEDURE — 05HY33Z INSERTION OF INFUSION DEVICE INTO UPPER VEIN, PERCUTANEOUS APPROACH: ICD-10-PCS | Performed by: RADIOLOGY

## 2022-02-02 PROCEDURE — 0JH60WZ INSERTION OF TOTALLY IMPLANTABLE VASCULAR ACCESS DEVICE INTO CHEST SUBCUTANEOUS TISSUE AND FASCIA, OPEN APPROACH: ICD-10-PCS | Performed by: RADIOLOGY

## 2022-02-02 PROCEDURE — 77001 FLUOROGUIDE FOR VEIN DEVICE: CPT

## 2022-02-02 RX ORDER — SODIUM CHLORIDE 9 MG/ML
INJECTION, SOLUTION INTRAVENOUS CONTINUOUS
Status: DISCONTINUED | OUTPATIENT
Start: 2022-02-02 | End: 2022-02-02

## 2022-02-02 RX ORDER — CEFAZOLIN SODIUM/WATER 2 G/20 ML
SYRINGE (ML) INTRAVENOUS
Status: COMPLETED
Start: 2022-02-02 | End: 2022-02-02

## 2022-02-02 RX ORDER — LIDOCAINE HYDROCHLORIDE 20 MG/ML
INJECTION, SOLUTION EPIDURAL; INFILTRATION; INTRACAUDAL; PERINEURAL
Status: COMPLETED
Start: 2022-02-02 | End: 2022-02-02

## 2022-02-02 RX ORDER — MIDAZOLAM HYDROCHLORIDE 1 MG/ML
INJECTION INTRAMUSCULAR; INTRAVENOUS
Status: COMPLETED
Start: 2022-02-02 | End: 2022-02-02

## 2022-02-02 RX ORDER — HEPARIN SODIUM (PORCINE) LOCK FLUSH IV SOLN 100 UNIT/ML 100 UNIT/ML
SOLUTION INTRAVENOUS
Status: COMPLETED
Start: 2022-02-02 | End: 2022-02-02

## 2022-02-02 NOTE — PRE-SEDATION ASSESSMENT
[facility]  IR Pre-Procedure Sedation Assessment    History of snoring or sleep or apnea? No    History of previous problems with anesthesia or sedation  No    Physical Findings:  Neck: nl ROM  CV: RRR  PULM: normal respiratory rate/effort    Mallampati Score:  II (hard and soft palate, upper portion of tonsils anduvula visible)    ASA Classification:   2.  Patient with mild systemic disease    Plan:   -IV moderate sedation    Susan Lau MD

## 2022-02-02 NOTE — PROCEDURES
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 Power injectable chest port placed. Port flushed with heparin. Specimens: None    Blood Loss:  5mL      Complications:  None    Drains:  None    Plan:  OK to use port.     Klaus Gabriel MD  2022

## 2022-02-02 NOTE — IVS NOTE
Patient is alert and oriented. Vital signs are stable. IV removed. Band-aid applied. Patient and  verbalized understanding of discharge instructions. Skin glue on right chest looks clean, dry, and intact. Patient denies pain. Patient transported to [facility] via wheelchair.  is driving patient home.

## 2022-02-02 NOTE — INTERVAL H&P NOTE
The above referenced H&P was reviewed by Tere Ayala MD on 2/2/2022, the patient was examined and no significant changes have occurred in the patient's condition since the H&P was performed. Risks, benefits, alternative treatments and consequences of no treatment were discussed. We will proceed with procedure as planned.       Tere Ayala MD  2/2/2022  10:15 AM

## 2022-02-03 ENCOUNTER — TELEPHONE (OUTPATIENT)
Dept: SURGERY | Facility: CLINIC | Age: 47
End: 2022-02-03

## 2022-02-03 RX ORDER — ROSUVASTATIN CALCIUM 20 MG/1
20 TABLET, COATED ORAL NIGHTLY
Qty: 90 TABLET | Refills: 0 | Status: SHIPPED | OUTPATIENT
Start: 2022-02-03

## 2022-02-03 NOTE — TELEPHONE ENCOUNTER
Called pt after receiving an appt cancellation for Dr. Raquel Lindquist on 2/9. Pt was originally scheduled on 1/26 but got covid so she needed to cancel. Pt is starting chemo on 2/9 so she canceled the post op appt with Dr. Raquel Lindquist. I mentioned Dr. Raquel Lindquist would be able to see her while she is in infusion on 2/9. Pt did state that her surgical site is healed and she has no concerns at this time. Pt appreciated my follow up phone call.

## 2022-02-03 NOTE — TELEPHONE ENCOUNTER
Please review. Protocol Failed or has no Protocol. Last Lipid done 6/9/20     Requested Prescriptions   Pending Prescriptions Disp Refills    rosuvastatin 20 MG Oral Tab 30 tablet 0     Sig: Take 1 tablet (20 mg total) by mouth nightly.         Cardiology Cholesterol Meds Failed - 2/3/2022  9:31 AM        Failed - LDL < 130 U/L within past 12 months     LDL Cholesterol   Date Value Ref Range Status   06/09/2020 102 (H) <100 mg/dL Final     Comment:     Desirable <100 mg/dL   Borderline 100-129 mg/dL   High     >=130mg/dL                         Failed - Appt in Cardiology 12 mo, 3 mo       Recent Outpatient Visits              1 week ago Follicular lymphoma grade II of intra-abdominal lymph nodes [facility])    [facility] Hematology Oncology    Nurse Only    1 week ago Follicular lymphoma grade II of intra-abdominal lymph nodes [facility])    [facility] Hematology Oncology LIZ Cerrato    Office Visit    3 weeks ago Follicular lymphoma grade II of intra-abdominal lymph nodes [facility])    [facility] Hematology Oncology Deidre Perez MD    Office Visit    4 weeks ago 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 [facility])    [facility] Marbella Shannon MD    Office Visit    1 month ago 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 [facility])    [facility] Hematology Oncology Deidre Perez MD    Office Visit     Future Appointments         Provider Department Appt Notes    In 6 days EM CC INFRN 2 [address] [place]    In 1 week EM CC INFRN 2 [address] - Infusion C1 D2 [place]-PORT-SL    In 1 month EM [address] Infusion [id number]    In 1 month [address] Hematology Oncology PRE CHEMO VISIT    In 1 month EM CC INFRN 2 [address] - Infusion SD-C2 D1 RITUXAN [place]-[facility]    In 1 month EM CC INFRN [address] - Infusion C2 D2 [place]               Passed - AST < 80 U/L within past 12 months     Lab Results   Component Value Date    AST 20 01/21/2022                       Passed - ALT < 80 U/L within past 12 months     Lab Results   Component Value Date    ALT 39 01/21/2022                            Recent Outpatient Visits              1 week ago Follicular lymphoma grade II of intra-abdominal lymph nodes [facility])    [facility] Hematology Oncology    Nurse Only    1 week ago Follicular lymphoma grade II of intra-abdominal lymph nodes [facility])    [facility] Hematology Oncology LIZ Barlow    Office Visit    3 weeks ago Follicular lymphoma grade II of intra-abdominal lymph nodes [facility])    [facility] Hematology Oncology Nicole Holloway MD    Office Visit    4 weeks ago 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 [facility])    [facility] Wisam Sanchez MD    Office Visit    1 month ago 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

## 2022-02-08 RX ORDER — DIPHENHYDRAMINE HCL 50 MG
50 CAPSULE ORAL ONCE
Status: CANCELLED | OUTPATIENT
Start: 2022-02-08

## 2022-02-08 RX ORDER — ACETAMINOPHEN 325 MG/1
650 TABLET ORAL ONCE
Status: CANCELLED | OUTPATIENT
Start: 2022-02-08

## 2022-02-09 ENCOUNTER — OFFICE VISIT (OUTPATIENT)
Dept: SURGERY | Facility: CLINIC | Age: 47
End: 2022-02-09
Payer: COMMERCIAL

## 2022-02-09 ENCOUNTER — NURSE ONLY (OUTPATIENT)
Dept: HEMATOLOGY/ONCOLOGY | Facility: HOSPITAL | Age: 47
End: 2022-02-09
Attending: NURSE PRACTITIONER
Payer: COMMERCIAL

## 2022-02-09 VITALS
TEMPERATURE: 98 F | SYSTOLIC BLOOD PRESSURE: 104 MMHG | WEIGHT: 200.81 LBS | DIASTOLIC BLOOD PRESSURE: 72 MMHG | RESPIRATION RATE: 16 BRPM | BODY MASS INDEX: 36 KG/M2 | OXYGEN SATURATION: 98 % | HEART RATE: 89 BPM

## 2022-02-09 DIAGNOSIS — C82.13 FOLLICULAR LYMPHOMA GRADE II OF INTRA-ABDOMINAL LYMPH NODES (HCC): Primary | ICD-10-CM

## 2022-02-09 PROCEDURE — 96413 CHEMO IV INFUSION 1 HR: CPT

## 2022-02-09 PROCEDURE — 96415 CHEMO IV INFUSION ADDL HR: CPT

## 2022-02-09 PROCEDURE — 96411 CHEMO IV PUSH ADDL DRUG: CPT

## 2022-02-09 PROCEDURE — 96375 TX/PRO/DX INJ NEW DRUG ADDON: CPT

## 2022-02-09 PROCEDURE — 99024 POSTOP FOLLOW-UP VISIT: CPT | Performed by: PHYSICIAN ASSISTANT

## 2022-02-09 RX ORDER — ACETAMINOPHEN 325 MG/1
TABLET ORAL
Status: COMPLETED
Start: 2022-02-09 | End: 2022-02-09

## 2022-02-09 RX ORDER — DIPHENHYDRAMINE HCL 50 MG
CAPSULE ORAL
Status: COMPLETED
Start: 2022-02-09 | End: 2022-02-09

## 2022-02-09 RX ORDER — DIPHENHYDRAMINE HCL 50 MG
50 CAPSULE ORAL ONCE
Status: COMPLETED | OUTPATIENT
Start: 2022-02-09 | End: 2022-02-09

## 2022-02-09 RX ORDER — HEPARIN SODIUM (PORCINE) LOCK FLUSH IV SOLN 100 UNIT/ML 100 UNIT/ML
SOLUTION INTRAVENOUS
Status: DISCONTINUED
Start: 2022-02-09 | End: 2022-02-09

## 2022-02-09 RX ORDER — ACETAMINOPHEN 325 MG/1
650 TABLET ORAL ONCE
Status: COMPLETED | OUTPATIENT
Start: 2022-02-09 | End: 2022-02-09

## 2022-02-09 RX ADMIN — DIPHENHYDRAMINE HCL 50 MG: 50 MG CAPSULE ORAL at 09:17:00

## 2022-02-09 RX ADMIN — ACETAMINOPHEN 650 MG: 325 TABLET ORAL at 09:17:00

## 2022-02-09 NOTE — PROGRESS NOTES
Pt here for C  1  D 1   Rituxan/Bendeka      Arrives Ambulating independently, accompanied by spouse            Pregnancy screening: Denies possibility of pregnancy    Modifications in dose or schedule: No     Frequency of blood return and site check throughout administration: Prior to administration, Prior to each drug and At completion of therapy     Port remains access for Day 2  Per patient request  port saline and hep locked     Discharged to Home, Ambulating independently, accompanied by: spouse    Outpatient Oncology Care Plan  Problem list:  anemia  loss of appetite  neutropenia  fatigue  nausea and vomiting  nutrition  Problems related to:    chemotherapy  Interventions:  educate hygiene/handwashing  monitor for signs/symptoms of infection  chemotherapy teaching  encourage activity as tolerated  maintain skin integrity  Expected outcomes:  able to maintain oral integrity  adequate sleep/rest  family support/coping well  free of infection  maintains/gains weight  optimal lab values  patient supported/coping well  symptoms relieved/minimized  understands plan of care  verbalize how to care for self  Progress towards outcome:  unchanged    Education Record    Learner:  Patient  Barriers / Limitations:  None  Method:  Discussion, Printed material and Reinforcement  Outcome:    Comments:  discuss medication profile, possible SE , ways to prevent and manage them

## 2022-02-09 NOTE — PROGRESS NOTES
Surgical Oncology Progress Note    S: Patient was seen and examined in the [facility] center. She is status post robotic assisted mesenteric lymph node excisional biopsy on 1/10/2022. She tolerated the procedure well. No fevers, chills, abd pain, nausea, vomiting, diarrhea.  She recently began chemotherapy under the direction of Dr. Cori Renee: Patient alert oriented in no acute distress  Abdomen is soft, nontender incisions well-healed without drainage or erythema    A/P:   Status post robotic assisted mesenteric lymph node biopsy 8/47/5830  Follicular lymphoma    Patient is doing well from a post postoperative standpoint  Continue chemotherapy under the direction of Dr. Flores Woody  She may follow-up with us on an as-needed basis    Christen Christianson PA-C    [facility]  [phone]  Pager: [phone]

## 2022-02-10 ENCOUNTER — TELEPHONE (OUTPATIENT)
Dept: HEMATOLOGY/ONCOLOGY | Facility: HOSPITAL | Age: 47
End: 2022-02-10

## 2022-02-10 ENCOUNTER — OFFICE VISIT (OUTPATIENT)
Dept: HEMATOLOGY/ONCOLOGY | Facility: HOSPITAL | Age: 47
End: 2022-02-10
Attending: NURSE PRACTITIONER
Payer: COMMERCIAL

## 2022-02-10 VITALS
TEMPERATURE: 98 F | RESPIRATION RATE: 18 BRPM | DIASTOLIC BLOOD PRESSURE: 78 MMHG | SYSTOLIC BLOOD PRESSURE: 141 MMHG | OXYGEN SATURATION: 97 % | HEART RATE: 91 BPM

## 2022-02-10 DIAGNOSIS — Z45.2 ENCOUNTER FOR CENTRAL LINE CARE: ICD-10-CM

## 2022-02-10 DIAGNOSIS — Z51.81 MEDICATION MONITORING ENCOUNTER: ICD-10-CM

## 2022-02-10 DIAGNOSIS — C82.13 FOLLICULAR LYMPHOMA GRADE II OF INTRA-ABDOMINAL LYMPH NODES (HCC): Primary | ICD-10-CM

## 2022-02-10 PROCEDURE — 96409 CHEMO IV PUSH SNGL DRUG: CPT

## 2022-02-10 PROCEDURE — 96375 TX/PRO/DX INJ NEW DRUG ADDON: CPT

## 2022-02-10 RX ORDER — HEPARIN SODIUM (PORCINE) LOCK FLUSH IV SOLN 100 UNIT/ML 100 UNIT/ML
5 SOLUTION INTRAVENOUS ONCE
Status: COMPLETED | OUTPATIENT
Start: 2022-02-10 | End: 2022-02-10

## 2022-02-10 RX ORDER — HEPARIN SODIUM (PORCINE) LOCK FLUSH IV SOLN 100 UNIT/ML 100 UNIT/ML
SOLUTION INTRAVENOUS
Status: COMPLETED
Start: 2022-02-10 | End: 2022-02-10

## 2022-02-10 RX ORDER — HEPARIN SODIUM (PORCINE) LOCK FLUSH IV SOLN 100 UNIT/ML 100 UNIT/ML
5 SOLUTION INTRAVENOUS ONCE
Status: CANCELLED | OUTPATIENT
Start: 2022-02-10

## 2022-02-10 RX ORDER — SODIUM CHLORIDE 9 MG/ML
10 INJECTION INTRAVENOUS ONCE
Status: CANCELLED | OUTPATIENT
Start: 2022-02-10

## 2022-02-10 RX ADMIN — HEPARIN SODIUM (PORCINE) LOCK FLUSH IV SOLN 100 UNIT/ML 500 UNITS: 100 SOLUTION INTRAVENOUS at 16:00:00

## 2022-02-10 NOTE — TELEPHONE ENCOUNTER
Returned call to [place] and reviewed with her that Rituxan is only due on Day 1 of each cycle along with Bendamustine- day 2 is only Bendamustine. Patient stated understanding. Also reviewed premedications with patient.

## 2022-02-10 NOTE — PROGRESS NOTES
Pt here for C1D2 bendeka. Arrives Ambulating independently,     Modifications in dose or schedule: No. Had difficulty falling asleep and staying asleep last night. Noticing redness to cheeks. Reviewed with Rock- dose of decadron not changed to help decrease potential for nausea. This nurse reviewed strategies to help with insomnia.      Frequency of blood return and site check throughout administration: Prior to administration, Prior to each drug and At completion of therapy   Discharged to Other stable from infusion, Ambulating independently, accompanied by:Family member    Outpatient Oncology Care Plan  Problem list:  flushing to face   Problems related to:    chemotherapy  disease/disease progression  side effect of treatment  Interventions:  chemotherapy teaching  educate regarding self care  encourage activity as tolerated  maintain skin integrity  monitor effect of therapy  Expected outcomes:  able to maintain oral integrity  adequate sleep/rest  family support/coping well  free of infection  maintains/gains weight  no active bleeding  nutritional needs met  optimal lab values  oral membranes intact  patient supported/coping well  safe in environment  symptoms relieved/minimized  understands plan of care  verbalize how to care for self  Progress towards outcome:  making progress    Education Record    Learner:  Patient  Barriers / Limitations:  None  Method:  Discussion  Outcome:  Shows understanding  Comments: reviewed side effects of steroid

## 2022-02-12 ENCOUNTER — PATIENT MESSAGE (OUTPATIENT)
Dept: HEMATOLOGY/ONCOLOGY | Facility: HOSPITAL | Age: 47
End: 2022-02-12

## 2022-02-13 NOTE — TELEPHONE ENCOUNTER
Patient called due to having pain in her skill, jaw and neck. No fevers. She also states she is having constipation and finally had a bowel movement states there was blood. She has hemorrhoids and has bled in the past. She is taking colace. Recommend to switch to senna-s 1-2 tabs po bid and recommend sitz baths. Recommend to keep hydrated. Relied to her myChart and recommend no hot tubs and no manicures or pedicures during treatment.       Dr. Brenden Dooley

## 2022-03-02 PROCEDURE — 87338 HPYLORI STOOL AG IA: CPT

## 2022-03-03 ENCOUNTER — LAB ENCOUNTER (OUTPATIENT)
Dept: LAB | Age: 47
End: 2022-03-03
Attending: INTERNAL MEDICINE
Payer: COMMERCIAL

## 2022-03-03 DIAGNOSIS — A04.8 H. PYLORI INFECTION: ICD-10-CM

## 2022-03-08 ENCOUNTER — PATIENT MESSAGE (OUTPATIENT)
Dept: GASTROENTEROLOGY | Facility: CLINIC | Age: 47
End: 2022-03-08

## 2022-03-08 NOTE — TELEPHONE ENCOUNTER
From: [facility]  To: Girish Barraza MD  Sent: 3/8/2022 7:52 AM CST  Subject: Results    Any results yet on h pylori test?

## 2022-03-08 NOTE — TELEPHONE ENCOUNTER
Contacted lab and spoke to Karen for an update on H. Pylori test. States they send out the sample so may take longer, likely will result in the next day or two. Patient notified via [address] [place] [address] [address].

## 2022-03-09 LAB — HELICOBACTER PYLORI AG, FECAL: NEGATIVE

## 2022-03-10 ENCOUNTER — OFFICE VISIT (OUTPATIENT)
Dept: HEMATOLOGY/ONCOLOGY | Facility: HOSPITAL | Age: 47
End: 2022-03-10
Attending: INTERNAL MEDICINE
Payer: COMMERCIAL

## 2022-03-10 ENCOUNTER — NURSE ONLY (OUTPATIENT)
Dept: HEMATOLOGY/ONCOLOGY | Facility: HOSPITAL | Age: 47
End: 2022-03-10
Attending: NURSE PRACTITIONER
Payer: COMMERCIAL

## 2022-03-10 VITALS
HEART RATE: 87 BPM | BODY MASS INDEX: 36 KG/M2 | TEMPERATURE: 99 F | OXYGEN SATURATION: 96 % | WEIGHT: 201 LBS | DIASTOLIC BLOOD PRESSURE: 65 MMHG | RESPIRATION RATE: 18 BRPM | SYSTOLIC BLOOD PRESSURE: 123 MMHG

## 2022-03-10 VITALS
WEIGHT: 201 LBS | SYSTOLIC BLOOD PRESSURE: 128 MMHG | DIASTOLIC BLOOD PRESSURE: 76 MMHG | TEMPERATURE: 99 F | OXYGEN SATURATION: 98 % | HEART RATE: 77 BPM | BODY MASS INDEX: 35.61 KG/M2 | RESPIRATION RATE: 18 BRPM | HEIGHT: 63 IN

## 2022-03-10 DIAGNOSIS — Z51.81 MEDICATION MONITORING ENCOUNTER: ICD-10-CM

## 2022-03-10 DIAGNOSIS — Z09 CHEMOTHERAPY FOLLOW-UP EXAMINATION: ICD-10-CM

## 2022-03-10 DIAGNOSIS — C82.13 FOLLICULAR LYMPHOMA GRADE II OF INTRA-ABDOMINAL LYMPH NODES (HCC): Primary | ICD-10-CM

## 2022-03-10 DIAGNOSIS — C82.13 FOLLICULAR LYMPHOMA GRADE II OF INTRA-ABDOMINAL LYMPH NODES (HCC): ICD-10-CM

## 2022-03-10 DIAGNOSIS — Z51.81 MEDICATION MONITORING ENCOUNTER: Primary | ICD-10-CM

## 2022-03-10 DIAGNOSIS — Z45.2 ENCOUNTER FOR CENTRAL LINE CARE: ICD-10-CM

## 2022-03-10 LAB
ALBUMIN SERPL-MCNC: 3.6 G/DL (ref 3.4–5)
ALBUMIN/GLOB SERPL: 1 {RATIO} (ref 1–2)
ALP LIVER SERPL-CCNC: 69 U/L
ALT SERPL-CCNC: 47 U/L
ANION GAP SERPL CALC-SCNC: 6 MMOL/L (ref 0–18)
AST SERPL-CCNC: 28 U/L (ref 15–37)
BASOPHILS # BLD AUTO: 0.04 X10(3) UL (ref 0–0.2)
BASOPHILS NFR BLD AUTO: 0.9 %
BUN BLD-MCNC: 13 MG/DL (ref 7–18)
BUN/CREAT SERPL: 18.6 (ref 10–20)
CALCIUM BLD-MCNC: 8.4 MG/DL (ref 8.5–10.1)
CHLORIDE SERPL-SCNC: 113 MMOL/L (ref 98–112)
CO2 SERPL-SCNC: 23 MMOL/L (ref 21–32)
CREAT BLD-MCNC: 0.7 MG/DL
DEPRECATED RDW RBC AUTO: 40.8 FL (ref 35.1–46.3)
EOSINOPHIL # BLD AUTO: 0.17 X10(3) UL (ref 0–0.7)
EOSINOPHIL NFR BLD AUTO: 4 %
ERYTHROCYTE [DISTWIDTH] IN BLOOD BY AUTOMATED COUNT: 13.2 % (ref 11–15)
GLOBULIN PLAS-MCNC: 3.5 G/DL (ref 2.8–4.4)
GLUCOSE BLD-MCNC: 99 MG/DL (ref 70–99)
HCT VFR BLD AUTO: 39.7 %
HGB BLD-MCNC: 12.8 G/DL
IMM GRANULOCYTES # BLD AUTO: 0.02 X10(3) UL (ref 0–1)
IMM GRANULOCYTES NFR BLD: 0.5 %
LYMPHOCYTES # BLD AUTO: 0.66 X10(3) UL (ref 1–4)
LYMPHOCYTES NFR BLD AUTO: 15.5 %
MCH RBC QN AUTO: 27.5 PG (ref 26–34)
MCHC RBC AUTO-ENTMCNC: 32.2 G/DL (ref 31–37)
MCV RBC AUTO: 85.2 FL
MONOCYTES # BLD AUTO: 0.75 X10(3) UL (ref 0.1–1)
MONOCYTES NFR BLD AUTO: 17.6 %
NEUTROPHILS # BLD AUTO: 2.61 X10 (3) UL (ref 1.5–7.7)
NEUTROPHILS # BLD AUTO: 2.61 X10(3) UL (ref 1.5–7.7)
NEUTROPHILS NFR BLD AUTO: 61.5 %
OSMOLALITY SERPL CALC.SUM OF ELEC: 294 MOSM/KG (ref 275–295)
PLATELET # BLD AUTO: 305 10(3)UL (ref 150–450)
POTASSIUM SERPL-SCNC: 3.9 MMOL/L (ref 3.5–5.1)
PROT SERPL-MCNC: 7.1 G/DL (ref 6.4–8.2)
RBC # BLD AUTO: 4.66 X10(6)UL
SODIUM SERPL-SCNC: 142 MMOL/L (ref 136–145)
WBC # BLD AUTO: 4.3 X10(3) UL (ref 4–11)

## 2022-03-10 PROCEDURE — 96411 CHEMO IV PUSH ADDL DRUG: CPT

## 2022-03-10 PROCEDURE — 96415 CHEMO IV INFUSION ADDL HR: CPT

## 2022-03-10 PROCEDURE — 99215 OFFICE O/P EST HI 40 MIN: CPT | Performed by: INTERNAL MEDICINE

## 2022-03-10 PROCEDURE — 96413 CHEMO IV INFUSION 1 HR: CPT

## 2022-03-10 PROCEDURE — 80053 COMPREHEN METABOLIC PANEL: CPT

## 2022-03-10 PROCEDURE — 96367 TX/PROPH/DG ADDL SEQ IV INF: CPT

## 2022-03-10 PROCEDURE — 85025 COMPLETE CBC W/AUTO DIFF WBC: CPT

## 2022-03-10 RX ORDER — ACETAMINOPHEN 325 MG/1
TABLET ORAL
Status: COMPLETED
Start: 2022-03-10 | End: 2022-03-10

## 2022-03-10 RX ORDER — SODIUM CHLORIDE 9 MG/ML
10 INJECTION INTRAVENOUS ONCE
OUTPATIENT
Start: 2022-03-10

## 2022-03-10 RX ORDER — DIPHENHYDRAMINE HCL 50 MG
50 CAPSULE ORAL ONCE
Status: CANCELLED | OUTPATIENT
Start: 2022-03-10

## 2022-03-10 RX ORDER — DIPHENHYDRAMINE HCL 50 MG
CAPSULE ORAL
Status: COMPLETED
Start: 2022-03-10 | End: 2022-03-10

## 2022-03-10 RX ORDER — HEPARIN SODIUM (PORCINE) LOCK FLUSH IV SOLN 100 UNIT/ML 100 UNIT/ML
SOLUTION INTRAVENOUS
Status: COMPLETED
Start: 2022-03-10 | End: 2022-03-10

## 2022-03-10 RX ORDER — ACETAMINOPHEN 325 MG/1
650 TABLET ORAL ONCE
Status: CANCELLED | OUTPATIENT
Start: 2022-03-10

## 2022-03-10 RX ORDER — HEPARIN SODIUM (PORCINE) LOCK FLUSH IV SOLN 100 UNIT/ML 100 UNIT/ML
5 SOLUTION INTRAVENOUS ONCE
Status: CANCELLED | OUTPATIENT
Start: 2022-03-10

## 2022-03-10 RX ORDER — DIPHENHYDRAMINE HCL 50 MG
50 CAPSULE ORAL ONCE
Status: COMPLETED | OUTPATIENT
Start: 2022-03-10 | End: 2022-03-10

## 2022-03-10 RX ORDER — ACETAMINOPHEN 325 MG/1
650 TABLET ORAL ONCE
Status: COMPLETED | OUTPATIENT
Start: 2022-03-10 | End: 2022-03-10

## 2022-03-10 RX ORDER — HEPARIN SODIUM (PORCINE) LOCK FLUSH IV SOLN 100 UNIT/ML 100 UNIT/ML
5 SOLUTION INTRAVENOUS ONCE
Status: COMPLETED | OUTPATIENT
Start: 2022-03-10 | End: 2022-03-10

## 2022-03-10 RX ADMIN — ACETAMINOPHEN 650 MG: 325 TABLET ORAL at 10:03:00

## 2022-03-10 RX ADMIN — DIPHENHYDRAMINE HCL 50 MG: 50 MG CAPSULE ORAL at 10:03:00

## 2022-03-10 RX ADMIN — HEPARIN SODIUM (PORCINE) LOCK FLUSH IV SOLN 100 UNIT/ML 500 UNITS: 100 SOLUTION INTRAVENOUS at 12:52:00

## 2022-03-11 ENCOUNTER — OFFICE VISIT (OUTPATIENT)
Dept: HEMATOLOGY/ONCOLOGY | Facility: HOSPITAL | Age: 47
End: 2022-03-11
Attending: INTERNAL MEDICINE
Payer: COMMERCIAL

## 2022-03-11 ENCOUNTER — OFFICE VISIT (OUTPATIENT)
Dept: HEMATOLOGY/ONCOLOGY | Facility: HOSPITAL | Age: 47
End: 2022-03-11
Attending: NURSE PRACTITIONER
Payer: COMMERCIAL

## 2022-03-11 VITALS
SYSTOLIC BLOOD PRESSURE: 128 MMHG | RESPIRATION RATE: 18 BRPM | OXYGEN SATURATION: 98 % | TEMPERATURE: 98 F | HEART RATE: 99 BPM | DIASTOLIC BLOOD PRESSURE: 73 MMHG

## 2022-03-11 VITALS
TEMPERATURE: 98 F | RESPIRATION RATE: 18 BRPM | DIASTOLIC BLOOD PRESSURE: 73 MMHG | SYSTOLIC BLOOD PRESSURE: 128 MMHG | OXYGEN SATURATION: 98 % | HEART RATE: 99 BPM

## 2022-03-11 DIAGNOSIS — C82.13 FOLLICULAR LYMPHOMA GRADE II OF INTRA-ABDOMINAL LYMPH NODES (HCC): Primary | ICD-10-CM

## 2022-03-11 DIAGNOSIS — T80.90XA INFUSION REACTION, INITIAL ENCOUNTER: ICD-10-CM

## 2022-03-11 DIAGNOSIS — Z51.81 MEDICATION MONITORING ENCOUNTER: ICD-10-CM

## 2022-03-11 DIAGNOSIS — Z45.2 ENCOUNTER FOR CENTRAL LINE CARE: ICD-10-CM

## 2022-03-11 PROCEDURE — 96409 CHEMO IV PUSH SNGL DRUG: CPT

## 2022-03-11 PROCEDURE — 96375 TX/PRO/DX INJ NEW DRUG ADDON: CPT

## 2022-03-11 PROCEDURE — 99214 OFFICE O/P EST MOD 30 MIN: CPT | Performed by: PHYSICIAN ASSISTANT

## 2022-03-11 PROCEDURE — S0028 INJECTION, FAMOTIDINE, 20 MG: HCPCS

## 2022-03-11 RX ORDER — DIPHENHYDRAMINE HCL 50 MG
CAPSULE ORAL
Status: COMPLETED
Start: 2022-03-11 | End: 2022-03-11

## 2022-03-11 RX ORDER — MONTELUKAST SODIUM 10 MG/1
10 TABLET ORAL ONCE
Status: COMPLETED | OUTPATIENT
Start: 2022-03-11 | End: 2022-03-11

## 2022-03-11 RX ORDER — MONTELUKAST SODIUM 10 MG/1
10 TABLET ORAL DAILY
Qty: 30 TABLET | Refills: 0 | Status: SHIPPED | OUTPATIENT
Start: 2022-03-11 | End: 2022-03-17 | Stop reason: ALTCHOICE

## 2022-03-11 RX ORDER — MONTELUKAST SODIUM 10 MG/1
10 TABLET ORAL ONCE
Status: CANCELLED
Start: 2022-03-11 | End: 2022-03-11

## 2022-03-11 RX ORDER — FAMOTIDINE 10 MG/ML
INJECTION, SOLUTION INTRAVENOUS
Status: COMPLETED
Start: 2022-03-11 | End: 2022-03-11

## 2022-03-11 RX ORDER — HEPARIN SODIUM (PORCINE) LOCK FLUSH IV SOLN 100 UNIT/ML 100 UNIT/ML
5 SOLUTION INTRAVENOUS ONCE
Status: COMPLETED | OUTPATIENT
Start: 2022-03-11 | End: 2022-03-11

## 2022-03-11 RX ORDER — HEPARIN SODIUM (PORCINE) LOCK FLUSH IV SOLN 100 UNIT/ML 100 UNIT/ML
SOLUTION INTRAVENOUS
Status: COMPLETED
Start: 2022-03-11 | End: 2022-03-11

## 2022-03-11 RX ORDER — DIPHENHYDRAMINE HCL 50 MG
50 CAPSULE ORAL ONCE
Status: CANCELLED
Start: 2022-03-11 | End: 2022-03-11

## 2022-03-11 RX ORDER — FAMOTIDINE 10 MG/ML
20 INJECTION, SOLUTION INTRAVENOUS ONCE
Status: CANCELLED
Start: 2022-03-11 | End: 2022-03-11

## 2022-03-11 RX ORDER — HEPARIN SODIUM (PORCINE) LOCK FLUSH IV SOLN 100 UNIT/ML 100 UNIT/ML
5 SOLUTION INTRAVENOUS ONCE
OUTPATIENT
Start: 2022-03-11

## 2022-03-11 RX ORDER — FAMOTIDINE 10 MG/ML
20 INJECTION, SOLUTION INTRAVENOUS ONCE
Status: COMPLETED | OUTPATIENT
Start: 2022-03-11 | End: 2022-03-11

## 2022-03-11 RX ORDER — DIPHENHYDRAMINE HCL 50 MG
50 CAPSULE ORAL ONCE
Status: COMPLETED | OUTPATIENT
Start: 2022-03-11 | End: 2022-03-11

## 2022-03-11 RX ORDER — MONTELUKAST SODIUM 10 MG/1
TABLET ORAL
Status: COMPLETED
Start: 2022-03-11 | End: 2022-03-11

## 2022-03-11 RX ORDER — SODIUM CHLORIDE 9 MG/ML
10 INJECTION INTRAVENOUS ONCE
OUTPATIENT
Start: 2022-03-11

## 2022-03-11 RX ADMIN — HEPARIN SODIUM (PORCINE) LOCK FLUSH IV SOLN 100 UNIT/ML 500 UNITS: 100 SOLUTION INTRAVENOUS at 10:56:00

## 2022-03-11 RX ADMIN — FAMOTIDINE 20 MG: 10 INJECTION, SOLUTION INTRAVENOUS at 10:09:00

## 2022-03-11 RX ADMIN — MONTELUKAST SODIUM 10 MG: 10 TABLET ORAL at 10:08:00

## 2022-03-11 RX ADMIN — DIPHENHYDRAMINE HCL 50 MG: 50 MG CAPSULE ORAL at 10:08:00

## 2022-03-11 NOTE — PATIENT INSTRUCTIONS
Take Singulair (montelukast) prior to each chemotherapy treatment. Get OTC benadryl (diphenhydramine) for itchy, rash, and to help with sleep.

## 2022-03-11 NOTE — PROGRESS NOTES
S:  51year old female presents today, with her spouse, with c/o warm rash that started on her upper chest and neck and spread to her BUE (RUE > LUE). Last night, after dinner, she noted some dyspnea and left lower back pain that lasted for 2 hours and resolved spontaneously. No other medications reported as new. Presents for cycle 2 day 2 bendamustine/rituximab. Denies any other concerns today. O:  WDWNWF, NAD  HEENT - unremarkable  Neck - supple, no LAD, no edema, no LAD  Lungs - non labored breathing, CTA  Cor - RRR without murmur  Back - no dermatitis, edema, ecchymosis or erythema  Derm: Moderate erythema upper chest and neck. RUE > LUE. Warm to touch. Similar rash not noticed on rest of body  No vesicles, open wounds or scaling. A:  1.  Stage II follicular lymphoma  2. Infusion/treatment reaction - delayed    P:   1. Reviewed 3/10/22 premeds: Add famotidine 20 mg, diphenhydramine 50 mg PO and montelukast 10 mg PO for future treatments  2. Instructed to take diphenhydramine over the weekend prn  3. Call the clinic prn  4.   Keep scheduled follow-up prior to cycle 3    Discussed plan with Dr. Lizbeth Weaver

## 2022-03-11 NOTE — PROGRESS NOTES
Pt here for Jenniffer Fragoso. Arrives Ambulating independently, accompanied by Spouse           Modifications in dose or schedule: No    Patient denies possible pregnancy since last therapy cycle: Denies    Patient reports she woke up with redness and warmth to face and neck. States when she was driving to apt she noticed blotches to her arm. Spoke with GATITO Marvin regarding symptoms - orders for pepcid, benadryl, and signulair received. Port previously accessed from chemotherapy yesterday, positive blood return noted.  Frequency of blood return and site check throughout administration: Prior to administration and At completion of therapy   Discharged with future appointments scheduled, Ambulating independently, accompanied by:Spouse    Outpatient Oncology Care Plan  Problem list:  fatigue  knowledge deficit  Problems related to:    chemotherapy  side effect of treatment  Interventions:  maintain safe environment  chemotherapy teaching  promoted rest  provided general teaching  Expected outcomes:  adequate sleep/rest  optimal lab values  safe in environment  understands plan of care  Progress towards outcome:  making progress    Education Record    Learner:  Patient and Spouse  Barriers / Limitations:  None  Method:  Discussion and Printed material  Outcome:  Observed demonstration  Comments:

## 2022-03-15 ENCOUNTER — TELEPHONE (OUTPATIENT)
Dept: HEMATOLOGY/ONCOLOGY | Facility: HOSPITAL | Age: 47
End: 2022-03-15

## 2022-03-15 NOTE — TELEPHONE ENCOUNTER
----- Message from Gregory Denis MD sent at 3/15/2022  8:35 AM CDT -----  Hi,    Patient called over the weekend for insomnia. Can one you call call her for follow up of post chemo symptoms? Thanks!     AG

## 2022-03-15 NOTE — TELEPHONE ENCOUNTER
Toxicities:C2 D2 Bendamustine on 3/11/2022    Condition Update: Rash/Insomnia (Mounika Kumari reports that the redness and rash have resolved completely. She is sleeping normally. She did have some swelling of her hands, arms, feet and lower legs from the steroids. The swelling is gone now. She also felt very tired over the weekend. No dyspnea at rest or with exertion. She spent the weekend resting. Yesterday she felt less fatigued. She went for two walks. Since the weekend she has been feeling hot and cold. No shaking chills or fever. She thinks she may be getting a cold. She feels tired, has body aches and a dry cough. She denies runny nose, post nasal drip, head/chest congestion or sore throat. No sinus pain or pressure or earaches. No urgency, frequency or burning with urination. No sick contacts. I offered her an acute care visit. She declined. I asked her to continue to monitor her temperature and symptoms. If she has a fever or 100.5 or higher and/or other worsening symptoms to call the office. She will continue to eat small, protein rich meals and push caffeine free fluids.

## 2022-03-17 ENCOUNTER — OFFICE VISIT (OUTPATIENT)
Dept: INTERNAL MEDICINE CLINIC | Facility: CLINIC | Age: 47
End: 2022-03-17
Payer: COMMERCIAL

## 2022-03-17 VITALS
BODY MASS INDEX: 34.15 KG/M2 | HEART RATE: 91 BPM | WEIGHT: 200 LBS | RESPIRATION RATE: 16 BRPM | DIASTOLIC BLOOD PRESSURE: 98 MMHG | HEIGHT: 64 IN | SYSTOLIC BLOOD PRESSURE: 149 MMHG | OXYGEN SATURATION: 97 %

## 2022-03-17 DIAGNOSIS — J01.00 ACUTE NON-RECURRENT MAXILLARY SINUSITIS: Primary | ICD-10-CM

## 2022-03-17 PROCEDURE — 3008F BODY MASS INDEX DOCD: CPT | Performed by: NURSE PRACTITIONER

## 2022-03-17 PROCEDURE — 99213 OFFICE O/P EST LOW 20 MIN: CPT | Performed by: NURSE PRACTITIONER

## 2022-03-17 PROCEDURE — 3080F DIAST BP >= 90 MM HG: CPT | Performed by: NURSE PRACTITIONER

## 2022-03-17 PROCEDURE — 3077F SYST BP >= 140 MM HG: CPT | Performed by: NURSE PRACTITIONER

## 2022-03-17 RX ORDER — AZITHROMYCIN 250 MG/1
TABLET, FILM COATED ORAL
Qty: 6 TABLET | Refills: 0 | Status: SHIPPED | OUTPATIENT
Start: 2022-03-17 | End: 2022-03-22

## 2022-03-21 ENCOUNTER — TELEPHONE (OUTPATIENT)
Dept: INTERNAL MEDICINE CLINIC | Facility: CLINIC | Age: 47
End: 2022-03-21

## 2022-03-21 ENCOUNTER — HOSPITAL ENCOUNTER (OUTPATIENT)
Dept: GENERAL RADIOLOGY | Facility: HOSPITAL | Age: 47
Discharge: HOME | End: 2022-03-21
Attending: NURSE PRACTITIONER
Payer: COMMERCIAL

## 2022-03-21 DIAGNOSIS — R05.9 COUGH: ICD-10-CM

## 2022-03-21 PROCEDURE — 71046 X-RAY EXAM CHEST 2 VIEWS: CPT | Performed by: NURSE PRACTITIONER

## 2022-03-21 RX ORDER — FEXOFENADINE HCL AND PSEUDOEPHEDRINE HCI 180; 240 MG/1; MG/1
1 TABLET, EXTENDED RELEASE ORAL DAILY
Qty: 30 TABLET | Refills: 0 | Status: SHIPPED
Start: 2022-03-21 | End: 2022-04-20

## 2022-03-21 NOTE — TELEPHONE ENCOUNTER
Patient was seen in office with Marbella Hyde on 03/17/22 for cough. She states that she took the recommended Mucinex and that it made her feel worse. She states that she was not feeling any relief while taking the prescribed antibiotic so she discontinued after 3 days of taking the antibiotic. She states she discontinued because the antibiotic was causing her to have diarrhea and upset stomach. She states she has been taking Nyquil and Dayquil with some relief. She denies any difficulty breathing or chest pain at this time. She states that she has intermittent shortness of breath due to chest congestion- no audible dyspnea heard on call- patient able to speak full sentences with no issues. She reports that she tested negative for COVID-19 with at-home COVID-19 test.      She is asking for further recommendations from [address] at this time. She asks if chest imaging is appropriate since she states she is still very congested. Please advise and thank you.

## 2022-03-21 NOTE — TELEPHONE ENCOUNTER
I sent to her pharmacy [facility] which is for congestion. It should help with nasal congestion and sinus pressure. I will also place an order for a chest x-ray if she would like to get that completed. Tell her I hope she feels better.

## 2022-04-06 RX ORDER — MONTELUKAST SODIUM 10 MG/1
10 TABLET ORAL DAILY
Qty: 30 TABLET | Refills: 0 | OUTPATIENT
Start: 2022-04-06

## 2022-04-07 ENCOUNTER — OFFICE VISIT (OUTPATIENT)
Dept: HEMATOLOGY/ONCOLOGY | Facility: HOSPITAL | Age: 47
End: 2022-04-07
Attending: NURSE PRACTITIONER
Payer: COMMERCIAL

## 2022-04-07 ENCOUNTER — NURSE ONLY (OUTPATIENT)
Dept: HEMATOLOGY/ONCOLOGY | Facility: HOSPITAL | Age: 47
End: 2022-04-07
Attending: INTERNAL MEDICINE
Payer: COMMERCIAL

## 2022-04-07 VITALS
SYSTOLIC BLOOD PRESSURE: 109 MMHG | BODY MASS INDEX: 35.79 KG/M2 | HEART RATE: 75 BPM | TEMPERATURE: 98 F | RESPIRATION RATE: 18 BRPM | HEIGHT: 63 IN | WEIGHT: 202 LBS | OXYGEN SATURATION: 99 % | DIASTOLIC BLOOD PRESSURE: 62 MMHG

## 2022-04-07 VITALS
OXYGEN SATURATION: 97 % | TEMPERATURE: 98 F | SYSTOLIC BLOOD PRESSURE: 119 MMHG | WEIGHT: 202 LBS | BODY MASS INDEX: 36 KG/M2 | RESPIRATION RATE: 16 BRPM | HEART RATE: 79 BPM | DIASTOLIC BLOOD PRESSURE: 63 MMHG

## 2022-04-07 DIAGNOSIS — Z45.2 ENCOUNTER FOR CENTRAL LINE CARE: ICD-10-CM

## 2022-04-07 DIAGNOSIS — Z09 CHEMOTHERAPY FOLLOW-UP EXAMINATION: ICD-10-CM

## 2022-04-07 DIAGNOSIS — F19.982 DRUG-INDUCED INSOMNIA (HCC): ICD-10-CM

## 2022-04-07 DIAGNOSIS — C82.13 FOLLICULAR LYMPHOMA GRADE II OF INTRA-ABDOMINAL LYMPH NODES (HCC): ICD-10-CM

## 2022-04-07 DIAGNOSIS — Z51.81 MEDICATION MONITORING ENCOUNTER: Primary | ICD-10-CM

## 2022-04-07 DIAGNOSIS — C82.13 FOLLICULAR LYMPHOMA GRADE II OF INTRA-ABDOMINAL LYMPH NODES (HCC): Primary | ICD-10-CM

## 2022-04-07 LAB
ALBUMIN SERPL-MCNC: 3.6 G/DL (ref 3.4–5)
ALBUMIN/GLOB SERPL: 1.1 {RATIO} (ref 1–2)
ALP LIVER SERPL-CCNC: 55 U/L
ALT SERPL-CCNC: 53 U/L
ANION GAP SERPL CALC-SCNC: 6 MMOL/L (ref 0–18)
AST SERPL-CCNC: 30 U/L (ref 15–37)
BASOPHILS # BLD AUTO: 0.04 X10(3) UL (ref 0–0.2)
BASOPHILS NFR BLD AUTO: 1 %
BILIRUB SERPL-MCNC: 0.9 MG/DL (ref 0.1–2)
BUN BLD-MCNC: 15 MG/DL (ref 7–18)
BUN/CREAT SERPL: 23.1 (ref 10–20)
CALCIUM BLD-MCNC: 8.4 MG/DL (ref 8.5–10.1)
CHLORIDE SERPL-SCNC: 111 MMOL/L (ref 98–112)
CO2 SERPL-SCNC: 25 MMOL/L (ref 21–32)
CREAT BLD-MCNC: 0.65 MG/DL
DEPRECATED RDW RBC AUTO: 43.7 FL (ref 35.1–46.3)
EOSINOPHIL # BLD AUTO: 0.56 X10(3) UL (ref 0–0.7)
EOSINOPHIL NFR BLD AUTO: 14.2 %
ERYTHROCYTE [DISTWIDTH] IN BLOOD BY AUTOMATED COUNT: 14 % (ref 11–15)
GLOBULIN PLAS-MCNC: 3.2 G/DL (ref 2.8–4.4)
GLUCOSE BLD-MCNC: 95 MG/DL (ref 70–99)
HCT VFR BLD AUTO: 37.9 %
HGB BLD-MCNC: 12.4 G/DL
IMM GRANULOCYTES # BLD AUTO: 0.01 X10(3) UL (ref 0–1)
IMM GRANULOCYTES NFR BLD: 0.3 %
LYMPHOCYTES # BLD AUTO: 0.54 X10(3) UL (ref 1–4)
LYMPHOCYTES NFR BLD AUTO: 13.7 %
MCH RBC QN AUTO: 27.9 PG (ref 26–34)
MCHC RBC AUTO-ENTMCNC: 32.7 G/DL (ref 31–37)
MCV RBC AUTO: 85.2 FL
MONOCYTES # BLD AUTO: 0.88 X10(3) UL (ref 0.1–1)
MONOCYTES NFR BLD AUTO: 22.4 %
NEUTROPHILS # BLD AUTO: 1.9 X10 (3) UL (ref 1.5–7.7)
NEUTROPHILS # BLD AUTO: 1.9 X10(3) UL (ref 1.5–7.7)
NEUTROPHILS NFR BLD AUTO: 48.4 %
OSMOLALITY SERPL CALC.SUM OF ELEC: 295 MOSM/KG (ref 275–295)
PLATELET # BLD AUTO: 233 10(3)UL (ref 150–450)
POTASSIUM SERPL-SCNC: 4 MMOL/L (ref 3.5–5.1)
PROT SERPL-MCNC: 6.8 G/DL (ref 6.4–8.2)
RBC # BLD AUTO: 4.45 X10(6)UL
SODIUM SERPL-SCNC: 142 MMOL/L (ref 136–145)
WBC # BLD AUTO: 3.9 X10(3) UL (ref 4–11)

## 2022-04-07 PROCEDURE — 96411 CHEMO IV PUSH ADDL DRUG: CPT

## 2022-04-07 PROCEDURE — S0028 INJECTION, FAMOTIDINE, 20 MG: HCPCS

## 2022-04-07 PROCEDURE — 96415 CHEMO IV INFUSION ADDL HR: CPT

## 2022-04-07 PROCEDURE — 85025 COMPLETE CBC W/AUTO DIFF WBC: CPT

## 2022-04-07 PROCEDURE — 99215 OFFICE O/P EST HI 40 MIN: CPT | Performed by: NURSE PRACTITIONER

## 2022-04-07 PROCEDURE — 80053 COMPREHEN METABOLIC PANEL: CPT

## 2022-04-07 PROCEDURE — 96375 TX/PRO/DX INJ NEW DRUG ADDON: CPT

## 2022-04-07 PROCEDURE — 96413 CHEMO IV INFUSION 1 HR: CPT

## 2022-04-07 RX ORDER — SODIUM CHLORIDE 9 MG/ML
10 INJECTION INTRAVENOUS ONCE
OUTPATIENT
Start: 2022-04-07

## 2022-04-07 RX ORDER — FAMOTIDINE 10 MG/ML
20 INJECTION, SOLUTION INTRAVENOUS ONCE
Status: CANCELLED
Start: 2022-04-07 | End: 2022-04-07

## 2022-04-07 RX ORDER — DIPHENHYDRAMINE HCL 50 MG
50 CAPSULE ORAL ONCE
Status: COMPLETED | OUTPATIENT
Start: 2022-04-07 | End: 2022-04-07

## 2022-04-07 RX ORDER — ACETAMINOPHEN 325 MG/1
TABLET ORAL
Status: COMPLETED
Start: 2022-04-07 | End: 2022-04-07

## 2022-04-07 RX ORDER — DIPHENHYDRAMINE HCL 50 MG
CAPSULE ORAL
Status: COMPLETED
Start: 2022-04-07 | End: 2022-04-07

## 2022-04-07 RX ORDER — FAMOTIDINE 10 MG/ML
20 INJECTION, SOLUTION INTRAVENOUS ONCE
Status: CANCELLED
Start: 2022-04-08 | End: 2022-04-08

## 2022-04-07 RX ORDER — DIPHENHYDRAMINE HCL 50 MG
50 CAPSULE ORAL ONCE
Status: CANCELLED
Start: 2022-04-08 | End: 2022-04-08

## 2022-04-07 RX ORDER — ACETAMINOPHEN 325 MG/1
650 TABLET ORAL ONCE
Status: CANCELLED | OUTPATIENT
Start: 2022-04-07

## 2022-04-07 RX ORDER — FAMOTIDINE 10 MG/ML
20 INJECTION, SOLUTION INTRAVENOUS ONCE
Status: COMPLETED | OUTPATIENT
Start: 2022-04-07 | End: 2022-04-07

## 2022-04-07 RX ORDER — HEPARIN SODIUM (PORCINE) LOCK FLUSH IV SOLN 100 UNIT/ML 100 UNIT/ML
5 SOLUTION INTRAVENOUS ONCE
Status: COMPLETED | OUTPATIENT
Start: 2022-04-07 | End: 2022-04-07

## 2022-04-07 RX ORDER — HEPARIN SODIUM (PORCINE) LOCK FLUSH IV SOLN 100 UNIT/ML 100 UNIT/ML
5 SOLUTION INTRAVENOUS ONCE
Status: CANCELLED | OUTPATIENT
Start: 2022-04-07

## 2022-04-07 RX ORDER — ACETAMINOPHEN 325 MG/1
650 TABLET ORAL ONCE
Status: COMPLETED | OUTPATIENT
Start: 2022-04-07 | End: 2022-04-07

## 2022-04-07 RX ORDER — HEPARIN SODIUM (PORCINE) LOCK FLUSH IV SOLN 100 UNIT/ML 100 UNIT/ML
SOLUTION INTRAVENOUS
Status: COMPLETED
Start: 2022-04-07 | End: 2022-04-07

## 2022-04-07 RX ORDER — DIPHENHYDRAMINE HCL 50 MG
50 CAPSULE ORAL ONCE
Status: CANCELLED | OUTPATIENT
Start: 2022-04-07

## 2022-04-07 RX ORDER — LORAZEPAM 0.5 MG/1
0.5 TABLET ORAL EVERY 6 HOURS PRN
Qty: 30 TABLET | Refills: 0 | Status: SHIPPED | OUTPATIENT
Start: 2022-04-07

## 2022-04-07 RX ORDER — FAMOTIDINE 10 MG/ML
INJECTION, SOLUTION INTRAVENOUS
Status: COMPLETED
Start: 2022-04-07 | End: 2022-04-07

## 2022-04-07 RX ADMIN — FAMOTIDINE 20 MG: 10 INJECTION, SOLUTION INTRAVENOUS at 09:47:00

## 2022-04-07 RX ADMIN — DIPHENHYDRAMINE HCL 50 MG: 50 MG CAPSULE ORAL at 09:44:00

## 2022-04-07 RX ADMIN — HEPARIN SODIUM (PORCINE) LOCK FLUSH IV SOLN 100 UNIT/ML 5 ML: 100 SOLUTION INTRAVENOUS at 12:40:00

## 2022-04-07 RX ADMIN — ACETAMINOPHEN 650 MG: 325 TABLET ORAL at 09:44:00

## 2022-04-07 NOTE — PROGRESS NOTES
Pt here for C3 D1 Rapid rate Rituxan, Bendeka. Arrives Ambulating independently, accompanied by Spouse           Modifications in dose or schedule: yes - dose mod 80% to bendeka, adding xyzal at home hs along with singulair AM d/t blotchy rash over body. Keep tyle/tricia po and zof/dex premeds as are. Adding loraz at hs for sleep d/t steroid    Patient denies possible pregnancy since last therapy cycle: Denies    Port previously accessed from chemotherapy yesterday, positive blood return noted.  Frequency of blood return and site check throughout administration: Prior to administration and At completion of therapy   Discharged with future appointments scheduled, Ambulating independently, accompanied by:Spouse    Outpatient Oncology Care Plan  Problem list:  fatigue  knowledge deficit  Problems related to:    chemotherapy  side effect of treatment  Interventions:  maintain safe environment  chemotherapy teaching  promoted rest  provided general teaching  Expected outcomes:  adequate sleep/rest  optimal lab values  safe in environment  understands plan of care  Progress towards outcome:  making progress    Education Record    Learner:  Patient and Spouse  Barriers / Limitations:  None  Method:  Discussion and Printed material  Outcome:  Observed demonstration  Comments:

## 2022-04-08 ENCOUNTER — OFFICE VISIT (OUTPATIENT)
Dept: HEMATOLOGY/ONCOLOGY | Facility: HOSPITAL | Age: 47
End: 2022-04-08
Attending: INTERNAL MEDICINE
Payer: COMMERCIAL

## 2022-04-08 VITALS
SYSTOLIC BLOOD PRESSURE: 134 MMHG | HEART RATE: 105 BPM | OXYGEN SATURATION: 98 % | RESPIRATION RATE: 16 BRPM | TEMPERATURE: 99 F | DIASTOLIC BLOOD PRESSURE: 78 MMHG

## 2022-04-08 DIAGNOSIS — Z45.2 ENCOUNTER FOR CENTRAL LINE CARE: ICD-10-CM

## 2022-04-08 DIAGNOSIS — C82.13 FOLLICULAR LYMPHOMA GRADE II OF INTRA-ABDOMINAL LYMPH NODES (HCC): ICD-10-CM

## 2022-04-08 DIAGNOSIS — Z51.81 MEDICATION MONITORING ENCOUNTER: Primary | ICD-10-CM

## 2022-04-08 PROCEDURE — 96413 CHEMO IV INFUSION 1 HR: CPT

## 2022-04-08 PROCEDURE — S0028 INJECTION, FAMOTIDINE, 20 MG: HCPCS

## 2022-04-08 PROCEDURE — 96375 TX/PRO/DX INJ NEW DRUG ADDON: CPT

## 2022-04-08 RX ORDER — SODIUM CHLORIDE 9 MG/ML
10 INJECTION INTRAVENOUS ONCE
OUTPATIENT
Start: 2022-04-08

## 2022-04-08 RX ORDER — FAMOTIDINE 10 MG/ML
20 INJECTION, SOLUTION INTRAVENOUS ONCE
Status: COMPLETED | OUTPATIENT
Start: 2022-04-08 | End: 2022-04-08

## 2022-04-08 RX ORDER — FAMOTIDINE 10 MG/ML
INJECTION, SOLUTION INTRAVENOUS
Status: COMPLETED
Start: 2022-04-08 | End: 2022-04-08

## 2022-04-08 RX ORDER — DIPHENHYDRAMINE HCL 50 MG
CAPSULE ORAL
Status: COMPLETED
Start: 2022-04-08 | End: 2022-04-08

## 2022-04-08 RX ORDER — DIPHENHYDRAMINE HCL 50 MG
50 CAPSULE ORAL ONCE
Status: COMPLETED | OUTPATIENT
Start: 2022-04-08 | End: 2022-04-08

## 2022-04-08 RX ORDER — HEPARIN SODIUM (PORCINE) LOCK FLUSH IV SOLN 100 UNIT/ML 100 UNIT/ML
5 SOLUTION INTRAVENOUS ONCE
OUTPATIENT
Start: 2022-04-08

## 2022-04-08 RX ORDER — HEPARIN SODIUM (PORCINE) LOCK FLUSH IV SOLN 100 UNIT/ML 100 UNIT/ML
5 SOLUTION INTRAVENOUS ONCE
Status: COMPLETED | OUTPATIENT
Start: 2022-04-08 | End: 2022-04-08

## 2022-04-08 RX ORDER — HEPARIN SODIUM (PORCINE) LOCK FLUSH IV SOLN 100 UNIT/ML 100 UNIT/ML
SOLUTION INTRAVENOUS
Status: COMPLETED
Start: 2022-04-08 | End: 2022-04-08

## 2022-04-08 RX ADMIN — DIPHENHYDRAMINE HCL 50 MG: 50 MG CAPSULE ORAL at 09:39:00

## 2022-04-08 RX ADMIN — FAMOTIDINE 20 MG: 10 INJECTION, SOLUTION INTRAVENOUS at 09:39:00

## 2022-04-08 RX ADMIN — HEPARIN SODIUM (PORCINE) LOCK FLUSH IV SOLN 100 UNIT/ML 500 UNITS: 100 SOLUTION INTRAVENOUS at 10:30:00

## 2022-04-08 NOTE — PROGRESS NOTES
Pt here for C3 D2 Bendeka. Arrives Ambulating independently. Modifications in dose or schedule: yes - dose mod 80% to bendeka with this cycle     Patient having redness in face only - does not have flushing all over body which she states happened with last cycle. Still has residual generalized rash that she states started before she started her treatment. Patient took lorazepam last night - states it did not help with sleep. Reached out to [facility] - she recommended taking 2 tabs lorazepam tonight. Patient verbalized understanding. Premeds given - PO benadryl, IV pepcid, zofran/dex    Patient denies possible pregnancy since last therapy cycle: Denies    Port previously accessed from chemotherapy yesterday, positive blood return noted. Frequency of blood return and site check throughout administration: Prior to administration and At completion of therapy     Patient scheduled for PETCT and next infusion appointments- did not want AVS printout - will use [email or web address].      Discharged with future appointments scheduled, Ambulating independently, accompanied by:Self    Outpatient Oncology Care Plan  Problem list:  fatigue  knowledge deficit  Problems related to:    chemotherapy  side effect of treatment  Interventions:  maintain safe environment  chemotherapy teaching  promoted rest  provided general teaching  Expected outcomes:  adequate sleep/rest  optimal lab values  safe in environment  understands plan of care  Progress towards outcome:  making progress    Education Record    Learner:  Patient and Spouse  Barriers / Limitations:  None  Method:  Discussion and Printed material  Outcome:  Observed demonstration  Comments:

## 2022-04-21 RX ORDER — MONTELUKAST SODIUM 10 MG/1
10 TABLET ORAL DAILY
Qty: 30 TABLET | Refills: 0 | OUTPATIENT
Start: 2022-04-21

## 2022-05-02 ENCOUNTER — HOSPITAL ENCOUNTER (OUTPATIENT)
Dept: NUCLEAR MEDICINE | Facility: HOSPITAL | Age: 47
Discharge: HOME | End: 2022-05-02
Attending: NURSE PRACTITIONER
Payer: COMMERCIAL

## 2022-05-02 DIAGNOSIS — C82.13 FOLLICULAR LYMPHOMA GRADE II OF INTRA-ABDOMINAL LYMPH NODES (HCC): ICD-10-CM

## 2022-05-02 LAB — GLUCOSE BLDC GLUCOMTR-MCNC: 96 MG/DL (ref 70–99)

## 2022-05-02 PROCEDURE — 82962 GLUCOSE BLOOD TEST: CPT

## 2022-05-02 PROCEDURE — 78815 PET IMAGE W/CT SKULL-THIGH: CPT | Performed by: NURSE PRACTITIONER

## 2022-05-05 ENCOUNTER — OFFICE VISIT (OUTPATIENT)
Dept: HEMATOLOGY/ONCOLOGY | Facility: HOSPITAL | Age: 47
End: 2022-05-05
Attending: NURSE PRACTITIONER
Payer: COMMERCIAL

## 2022-05-05 ENCOUNTER — NURSE ONLY (OUTPATIENT)
Dept: HEMATOLOGY/ONCOLOGY | Facility: HOSPITAL | Age: 47
End: 2022-05-05
Attending: INTERNAL MEDICINE
Payer: COMMERCIAL

## 2022-05-05 VITALS
BODY MASS INDEX: 36.68 KG/M2 | WEIGHT: 207 LBS | OXYGEN SATURATION: 98 % | HEIGHT: 63 IN | TEMPERATURE: 98 F | SYSTOLIC BLOOD PRESSURE: 102 MMHG | DIASTOLIC BLOOD PRESSURE: 58 MMHG | HEART RATE: 77 BPM | RESPIRATION RATE: 14 BRPM

## 2022-05-05 VITALS
BODY MASS INDEX: 36.68 KG/M2 | TEMPERATURE: 99 F | OXYGEN SATURATION: 98 % | HEIGHT: 63 IN | SYSTOLIC BLOOD PRESSURE: 124 MMHG | DIASTOLIC BLOOD PRESSURE: 61 MMHG | WEIGHT: 207 LBS | HEART RATE: 86 BPM | RESPIRATION RATE: 16 BRPM

## 2022-05-05 DIAGNOSIS — C82.13 FOLLICULAR LYMPHOMA GRADE II OF INTRA-ABDOMINAL LYMPH NODES (HCC): Primary | ICD-10-CM

## 2022-05-05 DIAGNOSIS — Z45.2 ENCOUNTER FOR CENTRAL LINE CARE: ICD-10-CM

## 2022-05-05 DIAGNOSIS — Z51.81 MEDICATION MONITORING ENCOUNTER: Primary | ICD-10-CM

## 2022-05-05 DIAGNOSIS — Z09 CHEMOTHERAPY FOLLOW-UP EXAMINATION: ICD-10-CM

## 2022-05-05 DIAGNOSIS — C82.13 FOLLICULAR LYMPHOMA GRADE II OF INTRA-ABDOMINAL LYMPH NODES (HCC): ICD-10-CM

## 2022-05-05 LAB
ALBUMIN SERPL-MCNC: 3.4 G/DL (ref 3.4–5)
ALBUMIN/GLOB SERPL: 1 {RATIO} (ref 1–2)
ALP LIVER SERPL-CCNC: 68 U/L
ALT SERPL-CCNC: 72 U/L
ANION GAP SERPL CALC-SCNC: 5 MMOL/L (ref 0–18)
AST SERPL-CCNC: 43 U/L (ref 15–37)
BASOPHILS # BLD AUTO: 0.03 X10(3) UL (ref 0–0.2)
BASOPHILS NFR BLD AUTO: 0.8 %
BILIRUB SERPL-MCNC: 0.6 MG/DL (ref 0.1–2)
BUN BLD-MCNC: 14 MG/DL (ref 7–18)
BUN/CREAT SERPL: 18.9 (ref 10–20)
CALCIUM BLD-MCNC: 8 MG/DL (ref 8.5–10.1)
CHLORIDE SERPL-SCNC: 111 MMOL/L (ref 98–112)
CO2 SERPL-SCNC: 25 MMOL/L (ref 21–32)
CREAT BLD-MCNC: 0.74 MG/DL
DEPRECATED RDW RBC AUTO: 43.5 FL (ref 35.1–46.3)
EOSINOPHIL # BLD AUTO: 0.2 X10(3) UL (ref 0–0.7)
EOSINOPHIL NFR BLD AUTO: 5.1 %
ERYTHROCYTE [DISTWIDTH] IN BLOOD BY AUTOMATED COUNT: 13.9 % (ref 11–15)
GLOBULIN PLAS-MCNC: 3.3 G/DL (ref 2.8–4.4)
GLUCOSE BLD-MCNC: 98 MG/DL (ref 70–99)
HCT VFR BLD AUTO: 39.2 %
HGB BLD-MCNC: 13.1 G/DL
IMM GRANULOCYTES # BLD AUTO: 0.02 X10(3) UL (ref 0–1)
IMM GRANULOCYTES NFR BLD: 0.5 %
LYMPHOCYTES # BLD AUTO: 0.4 X10(3) UL (ref 1–4)
LYMPHOCYTES NFR BLD AUTO: 10.2 %
MCH RBC QN AUTO: 28.5 PG (ref 26–34)
MCHC RBC AUTO-ENTMCNC: 33.4 G/DL (ref 31–37)
MCV RBC AUTO: 85.4 FL
MONOCYTES # BLD AUTO: 1.27 X10(3) UL (ref 0.1–1)
MONOCYTES NFR BLD AUTO: 32.4 %
NEUTROPHILS # BLD AUTO: 2 X10 (3) UL (ref 1.5–7.7)
NEUTROPHILS # BLD AUTO: 2 X10(3) UL (ref 1.5–7.7)
NEUTROPHILS NFR BLD AUTO: 51 %
OSMOLALITY SERPL CALC.SUM OF ELEC: 292 MOSM/KG (ref 275–295)
PLATELET # BLD AUTO: 252 10(3)UL (ref 150–450)
POTASSIUM SERPL-SCNC: 4.1 MMOL/L (ref 3.5–5.1)
PROT SERPL-MCNC: 6.7 G/DL (ref 6.4–8.2)
RBC # BLD AUTO: 4.59 X10(6)UL
SODIUM SERPL-SCNC: 141 MMOL/L (ref 136–145)
WBC # BLD AUTO: 3.9 X10(3) UL (ref 4–11)

## 2022-05-05 PROCEDURE — 96411 CHEMO IV PUSH ADDL DRUG: CPT

## 2022-05-05 PROCEDURE — 96415 CHEMO IV INFUSION ADDL HR: CPT

## 2022-05-05 PROCEDURE — 80053 COMPREHEN METABOLIC PANEL: CPT

## 2022-05-05 PROCEDURE — 85025 COMPLETE CBC W/AUTO DIFF WBC: CPT

## 2022-05-05 PROCEDURE — 96375 TX/PRO/DX INJ NEW DRUG ADDON: CPT

## 2022-05-05 PROCEDURE — 99215 OFFICE O/P EST HI 40 MIN: CPT | Performed by: NURSE PRACTITIONER

## 2022-05-05 PROCEDURE — 96413 CHEMO IV INFUSION 1 HR: CPT

## 2022-05-05 PROCEDURE — 96367 TX/PROPH/DG ADDL SEQ IV INF: CPT

## 2022-05-05 PROCEDURE — S0028 INJECTION, FAMOTIDINE, 20 MG: HCPCS

## 2022-05-05 RX ORDER — FAMOTIDINE 10 MG/ML
20 INJECTION, SOLUTION INTRAVENOUS ONCE
Status: CANCELLED
Start: 2022-05-06 | End: 2022-05-06

## 2022-05-05 RX ORDER — HEPARIN SODIUM (PORCINE) LOCK FLUSH IV SOLN 100 UNIT/ML 100 UNIT/ML
SOLUTION INTRAVENOUS
Status: DISCONTINUED
Start: 2022-05-05 | End: 2022-05-05 | Stop reason: WASHOUT

## 2022-05-05 RX ORDER — FAMOTIDINE 10 MG/ML
20 INJECTION, SOLUTION INTRAVENOUS ONCE
Status: CANCELLED
Start: 2022-05-05 | End: 2022-05-05

## 2022-05-05 RX ORDER — DIPHENHYDRAMINE HCL 50 MG
CAPSULE ORAL
Status: COMPLETED
Start: 2022-05-05 | End: 2022-05-05

## 2022-05-05 RX ORDER — FAMOTIDINE 10 MG/ML
INJECTION, SOLUTION INTRAVENOUS
Status: COMPLETED
Start: 2022-05-05 | End: 2022-05-05

## 2022-05-05 RX ORDER — LEVOCETIRIZINE DIHYDROCHLORIDE 5 MG/1
5 TABLET, FILM COATED ORAL EVERY EVENING
COMMUNITY

## 2022-05-05 RX ORDER — DIPHENHYDRAMINE HCL 50 MG
50 CAPSULE ORAL ONCE
Status: CANCELLED
Start: 2022-05-06 | End: 2022-05-06

## 2022-05-05 RX ORDER — SODIUM CHLORIDE 9 MG/ML
10 INJECTION INTRAVENOUS ONCE
OUTPATIENT
Start: 2022-05-05

## 2022-05-05 RX ORDER — MONTELUKAST SODIUM 10 MG/1
10 TABLET ORAL NIGHTLY
COMMUNITY
End: 2022-05-06

## 2022-05-05 RX ORDER — ACETAMINOPHEN 325 MG/1
650 TABLET ORAL ONCE
Status: CANCELLED | OUTPATIENT
Start: 2022-05-05

## 2022-05-05 RX ORDER — HEPARIN SODIUM (PORCINE) LOCK FLUSH IV SOLN 100 UNIT/ML 100 UNIT/ML
5 SOLUTION INTRAVENOUS ONCE
Status: DISCONTINUED | OUTPATIENT
Start: 2022-05-05 | End: 2022-05-05

## 2022-05-05 RX ORDER — ACETAMINOPHEN 325 MG/1
650 TABLET ORAL ONCE
Status: COMPLETED | OUTPATIENT
Start: 2022-05-05 | End: 2022-05-05

## 2022-05-05 RX ORDER — DIPHENHYDRAMINE HCL 50 MG
50 CAPSULE ORAL ONCE
Status: CANCELLED
Start: 2022-05-05 | End: 2022-05-05

## 2022-05-05 RX ORDER — DIPHENHYDRAMINE HCL 50 MG
50 CAPSULE ORAL ONCE
Status: COMPLETED | OUTPATIENT
Start: 2022-05-05 | End: 2022-05-05

## 2022-05-05 RX ORDER — OMEPRAZOLE 20 MG/1
20 CAPSULE, DELAYED RELEASE ORAL
COMMUNITY

## 2022-05-05 RX ORDER — DIPHENHYDRAMINE HCL 50 MG
50 CAPSULE ORAL ONCE
Status: CANCELLED | OUTPATIENT
Start: 2022-05-05

## 2022-05-05 RX ORDER — FAMOTIDINE 10 MG/ML
20 INJECTION, SOLUTION INTRAVENOUS ONCE
Status: COMPLETED | OUTPATIENT
Start: 2022-05-05 | End: 2022-05-05

## 2022-05-05 RX ORDER — HEPARIN SODIUM (PORCINE) LOCK FLUSH IV SOLN 100 UNIT/ML 100 UNIT/ML
5 SOLUTION INTRAVENOUS ONCE
Status: CANCELLED | OUTPATIENT
Start: 2022-05-05

## 2022-05-05 RX ORDER — ACETAMINOPHEN 325 MG/1
TABLET ORAL
Status: COMPLETED
Start: 2022-05-05 | End: 2022-05-05

## 2022-05-05 RX ADMIN — DIPHENHYDRAMINE HCL 50 MG: 50 MG CAPSULE ORAL at 09:42:00

## 2022-05-05 RX ADMIN — FAMOTIDINE 20 MG: 10 INJECTION, SOLUTION INTRAVENOUS at 09:42:00

## 2022-05-05 RX ADMIN — ACETAMINOPHEN 650 MG: 325 TABLET ORAL at 09:41:00

## 2022-05-05 NOTE — PROGRESS NOTES
Pt here for C4 D1 Bendeka/Rituxan. Arrives Ambulating independently. Modifications in dose or schedule: No  Patient states overall she has been feeling well. She has a few days post treatment that she struggles with appetite and fatigue. Premeds given - PO benadryl, IV pepcid, PO Tylenol, zofran/dex    Patient denies possible pregnancy since last therapy cycle: Denies    Port previously accessed from lab before MDV, positive blood return noted. Labs appropriate for treatment. Frequency of blood return and site check throughout administration: Prior to administration and At completion of therapy. PORT remained accessed for infusion tomorrow. Patient scheduled for  next infusion appointments- did not want AVS printout - will use MyChart.      Discharged with future appointments scheduled, Ambulating independently, accompanied by:Self    Outpatient Oncology Care Plan  Problem list:  fatigue  knowledge deficit  Problems related to:    chemotherapy  side effect of treatment  Interventions:  maintain safe environment  chemotherapy teaching  promoted rest  provided general teaching  Expected outcomes:  adequate sleep/rest  optimal lab values  safe in environment  understands plan of care  Progress towards outcome:  making progress    Education Record    Learner:  Patient and Spouse  Barriers / Limitations:  None  Method:  Discussion and Printed material  Outcome:  Observed demonstration  Comments:

## 2022-05-06 ENCOUNTER — OFFICE VISIT (OUTPATIENT)
Dept: HEMATOLOGY/ONCOLOGY | Facility: HOSPITAL | Age: 47
End: 2022-05-06
Attending: NURSE PRACTITIONER
Payer: COMMERCIAL

## 2022-05-06 VITALS
RESPIRATION RATE: 16 BRPM | HEART RATE: 103 BPM | OXYGEN SATURATION: 97 % | TEMPERATURE: 98 F | SYSTOLIC BLOOD PRESSURE: 123 MMHG | DIASTOLIC BLOOD PRESSURE: 70 MMHG

## 2022-05-06 DIAGNOSIS — Z51.81 MEDICATION MONITORING ENCOUNTER: Primary | ICD-10-CM

## 2022-05-06 DIAGNOSIS — Z45.2 ENCOUNTER FOR CENTRAL LINE CARE: ICD-10-CM

## 2022-05-06 DIAGNOSIS — C82.13 FOLLICULAR LYMPHOMA GRADE II OF INTRA-ABDOMINAL LYMPH NODES (HCC): ICD-10-CM

## 2022-05-06 PROCEDURE — 96409 CHEMO IV PUSH SNGL DRUG: CPT

## 2022-05-06 PROCEDURE — 96375 TX/PRO/DX INJ NEW DRUG ADDON: CPT

## 2022-05-06 PROCEDURE — S0028 INJECTION, FAMOTIDINE, 20 MG: HCPCS

## 2022-05-06 RX ORDER — DIPHENHYDRAMINE HCL 50 MG
CAPSULE ORAL
Status: COMPLETED
Start: 2022-05-06 | End: 2022-05-06

## 2022-05-06 RX ORDER — HEPARIN SODIUM (PORCINE) LOCK FLUSH IV SOLN 100 UNIT/ML 100 UNIT/ML
5 SOLUTION INTRAVENOUS ONCE
OUTPATIENT
Start: 2022-05-06

## 2022-05-06 RX ORDER — DIPHENHYDRAMINE HCL 50 MG
50 CAPSULE ORAL ONCE
Status: COMPLETED | OUTPATIENT
Start: 2022-05-06 | End: 2022-05-06

## 2022-05-06 RX ORDER — FAMOTIDINE 10 MG/ML
INJECTION, SOLUTION INTRAVENOUS
Status: COMPLETED
Start: 2022-05-06 | End: 2022-05-06

## 2022-05-06 RX ORDER — HEPARIN SODIUM (PORCINE) LOCK FLUSH IV SOLN 100 UNIT/ML 100 UNIT/ML
SOLUTION INTRAVENOUS
Status: COMPLETED
Start: 2022-05-06 | End: 2022-05-06

## 2022-05-06 RX ORDER — HEPARIN SODIUM (PORCINE) LOCK FLUSH IV SOLN 100 UNIT/ML 100 UNIT/ML
5 SOLUTION INTRAVENOUS ONCE
Status: COMPLETED | OUTPATIENT
Start: 2022-05-06 | End: 2022-05-06

## 2022-05-06 RX ORDER — ZOLPIDEM TARTRATE 5 MG/1
5 TABLET ORAL NIGHTLY PRN
Qty: 20 TABLET | Refills: 0 | Status: SHIPPED | OUTPATIENT
Start: 2022-05-06

## 2022-05-06 RX ORDER — SODIUM CHLORIDE 9 MG/ML
10 INJECTION INTRAVENOUS ONCE
OUTPATIENT
Start: 2022-05-06

## 2022-05-06 RX ORDER — FAMOTIDINE 10 MG/ML
20 INJECTION, SOLUTION INTRAVENOUS ONCE
Status: COMPLETED | OUTPATIENT
Start: 2022-05-06 | End: 2022-05-06

## 2022-05-06 RX ORDER — MONTELUKAST SODIUM 10 MG/1
10 TABLET ORAL NIGHTLY
Qty: 30 TABLET | Refills: 1 | Status: SHIPPED | OUTPATIENT
Start: 2022-05-06

## 2022-05-06 RX ADMIN — FAMOTIDINE 20 MG: 10 INJECTION, SOLUTION INTRAVENOUS at 10:02:00

## 2022-05-06 RX ADMIN — DIPHENHYDRAMINE HCL 50 MG: 50 MG CAPSULE ORAL at 10:02:00

## 2022-05-06 RX ADMIN — HEPARIN SODIUM (PORCINE) LOCK FLUSH IV SOLN 100 UNIT/ML 500 UNITS: 100 SOLUTION INTRAVENOUS at 10:50:00

## 2022-05-06 NOTE — PROGRESS NOTES
Pt here for C4D2 Bendeka. Arrives Ambulating independently, accompanied by Spouse           Modifications in dose or schedule: No    Patient denies possible pregnancy since last therapy cycle: Denies    She does have some redness and warmth noted to chest and face, as she has had after day 1 of her chemo cycle. She continues on Benadryl, Singulair and Decadron. Moses Woody also reports difficulty sleeping after treatments. She did try Lorezepam last night with no relief. Rock [facility] notified, she states that she sent a Singulair refill and new order for Ambien to the pharmacy for Moses Woody. Port previously accessed from chemotherapy yesterday, positive blood return noted.  Frequency of blood return and site check throughout administration: Prior to administration and At completion of therapy   Discharged with future appointments scheduled, Ambulating independently, accompanied by:Spouse    Outpatient Oncology Care Plan  Problem list:  fatigue  knowledge deficit  Problems related to:    chemotherapy  side effect of treatment  Interventions:  maintain safe environment  chemotherapy teaching  promoted rest  provided general teaching  Expected outcomes:  adequate sleep/rest  optimal lab values  safe in environment  understands plan of care  Progress towards outcome:  making progress    Education Record    Learner:  Patient and Spouse  Barriers / Limitations:  None  Method:  Discussion and Printed material  Outcome:  Observed demonstration  Comments:

## 2022-05-28 ENCOUNTER — PATIENT MESSAGE (OUTPATIENT)
Dept: INTERNAL MEDICINE CLINIC | Facility: CLINIC | Age: 47
End: 2022-05-28

## 2022-05-29 NOTE — TELEPHONE ENCOUNTER
See 5/7/22 refill request, was under [facility] cardiology department (now changed to [facility] ) . This encounter closed and we will use the previous encounter. From: [facility]  To: Betina Britt MD  Sent: 5/28/2022  8:55 AM CDT  Subject: Medication refill    Need a refill for the Rosuvastatin. Pharmacy has tried calling and no response.

## 2022-05-29 NOTE — TELEPHONE ENCOUNTER
Please Review.  Protocol failed or has no protocol  Requested Prescriptions   Pending Prescriptions Disp Refills    ROSUVASTATIN 20 MG Oral Tab [Pharmacy Med Name: ROSUVASTATIN CALCIUM 20 MG TAB] 90 tablet 0     Sig: TAKE 1 TABLET BY MOUTH EVERY DAY AT NIGHT        Cholesterol Medication Protocol Failed - 5/28/2022  7:20 PM        Failed - LDL in past 12 months        Failed - Last LDL < 130     Lab Results   Component Value Date     (H) 06/09/2020               Passed - ALT in past 12 months        Passed - Last ALT < 80       Lab Results   Component Value Date    ALT 72 (H) 05/05/2022             Passed - Appointment in past 12 or next 3 months             Recent Outpatient Visits              3 weeks ago Medication monitoring encounter    [address] Visit    3 weeks ago Follicular lymphoma grade II of intra-abdominal lymph nodes [facility] Hematology Oncology LIZ Marcano    Office Visit    3 weeks ago Medication monitoring encounter    [address] [facility] 15    Nurse Only    3 weeks ago Medication monitoring encounter    [address] Visit    1 month ago Medication monitoring encounter    [address] Visit           Future Appointments         Provider Department Appt Notes    In 4 days  [place] [id number]    In 4 days [facility] Hematology Oncology PRE CHEMO VISIT    In 4 days EM CC INFRN [address] [address] - Infusion C5 D1 RITUXAN/BENDEKA-PORT-MH    In 5 days EM CC INFRN [address] - Infusion C5 D2 RITUXAN/BENDEKA-PORT-MH    In 1 month  [place] [id number]    In 1 month [facility] Hematology Oncology PRE CHEMO VISIT    In 1 month EM CC INFRN 3 Keren Jacques OBEY CHAUDHARI/Mehrdad Samuel 1106 Infusion SD[facility]    In 1 month EM CC [address] - Infusion C6 D2 [facility]

## 2022-05-30 RX ORDER — ROSUVASTATIN CALCIUM 20 MG/1
TABLET, COATED ORAL
Qty: 90 TABLET | Refills: 0 | Status: SHIPPED
Start: 2022-05-30

## 2022-05-31 RX ORDER — MONTELUKAST SODIUM 10 MG/1
TABLET ORAL
Qty: 30 TABLET | Refills: 1 | Status: SHIPPED | OUTPATIENT
Start: 2022-05-31 | End: 2022-06-23

## 2022-06-01 RX ORDER — ROSUVASTATIN CALCIUM 20 MG/1
TABLET, COATED ORAL
Qty: 90 TABLET | Refills: 0 | OUTPATIENT
Start: 2022-06-01

## 2022-06-02 ENCOUNTER — OFFICE VISIT (OUTPATIENT)
Dept: HEMATOLOGY/ONCOLOGY | Facility: HOSPITAL | Age: 47
End: 2022-06-02
Attending: NURSE PRACTITIONER
Payer: COMMERCIAL

## 2022-06-02 VITALS
HEART RATE: 75 BPM | DIASTOLIC BLOOD PRESSURE: 59 MMHG | HEIGHT: 63 IN | WEIGHT: 210 LBS | TEMPERATURE: 98 F | BODY MASS INDEX: 37.21 KG/M2 | OXYGEN SATURATION: 99 % | SYSTOLIC BLOOD PRESSURE: 113 MMHG | RESPIRATION RATE: 16 BRPM

## 2022-06-02 VITALS
SYSTOLIC BLOOD PRESSURE: 134 MMHG | BODY MASS INDEX: 37.21 KG/M2 | WEIGHT: 210 LBS | TEMPERATURE: 98 F | HEIGHT: 63 IN | HEART RATE: 78 BPM | RESPIRATION RATE: 16 BRPM | DIASTOLIC BLOOD PRESSURE: 67 MMHG | OXYGEN SATURATION: 99 %

## 2022-06-02 DIAGNOSIS — C82.13 FOLLICULAR LYMPHOMA GRADE II OF INTRA-ABDOMINAL LYMPH NODES (HCC): Primary | ICD-10-CM

## 2022-06-02 DIAGNOSIS — Z51.81 MEDICATION MONITORING ENCOUNTER: Primary | ICD-10-CM

## 2022-06-02 DIAGNOSIS — Z45.2 ENCOUNTER FOR CENTRAL LINE CARE: ICD-10-CM

## 2022-06-02 DIAGNOSIS — C82.13 FOLLICULAR LYMPHOMA GRADE II OF INTRA-ABDOMINAL LYMPH NODES (HCC): ICD-10-CM

## 2022-06-02 DIAGNOSIS — Z09 CHEMOTHERAPY FOLLOW-UP EXAMINATION: ICD-10-CM

## 2022-06-02 LAB
ALBUMIN SERPL-MCNC: 3.4 G/DL (ref 3.4–5)
ALBUMIN/GLOB SERPL: 1.1 {RATIO} (ref 1–2)
ALP LIVER SERPL-CCNC: 67 U/L
ALT SERPL-CCNC: 64 U/L
ANION GAP SERPL CALC-SCNC: 5 MMOL/L (ref 0–18)
AST SERPL-CCNC: 45 U/L (ref 15–37)
BASOPHILS # BLD AUTO: 0.03 X10(3) UL (ref 0–0.2)
BASOPHILS NFR BLD AUTO: 0.6 %
BILIRUB SERPL-MCNC: 0.7 MG/DL (ref 0.1–2)
BUN BLD-MCNC: 9 MG/DL (ref 7–18)
BUN/CREAT SERPL: 14.1 (ref 10–20)
CALCIUM BLD-MCNC: 8.1 MG/DL (ref 8.5–10.1)
CHLORIDE SERPL-SCNC: 109 MMOL/L (ref 98–112)
CO2 SERPL-SCNC: 26 MMOL/L (ref 21–32)
CREAT BLD-MCNC: 0.64 MG/DL
DEPRECATED RDW RBC AUTO: 40.6 FL (ref 35.1–46.3)
EOSINOPHIL # BLD AUTO: 0.17 X10(3) UL (ref 0–0.7)
EOSINOPHIL NFR BLD AUTO: 3.5 %
ERYTHROCYTE [DISTWIDTH] IN BLOOD BY AUTOMATED COUNT: 13.1 % (ref 11–15)
GLOBULIN PLAS-MCNC: 3.1 G/DL (ref 2.8–4.4)
GLUCOSE BLD-MCNC: 102 MG/DL (ref 70–99)
HCT VFR BLD AUTO: 39.1 %
HGB BLD-MCNC: 12.9 G/DL
IMM GRANULOCYTES # BLD AUTO: 0.01 X10(3) UL (ref 0–1)
IMM GRANULOCYTES NFR BLD: 0.2 %
LYMPHOCYTES # BLD AUTO: 1.73 X10(3) UL (ref 1–4)
LYMPHOCYTES NFR BLD AUTO: 35.6 %
MCH RBC QN AUTO: 28.2 PG (ref 26–34)
MCHC RBC AUTO-ENTMCNC: 33 G/DL (ref 31–37)
MCV RBC AUTO: 85.4 FL
MONOCYTES # BLD AUTO: 0.92 X10(3) UL (ref 0.1–1)
MONOCYTES NFR BLD AUTO: 18.9 %
NEUTROPHILS # BLD AUTO: 2 X10 (3) UL (ref 1.5–7.7)
NEUTROPHILS # BLD AUTO: 2 X10(3) UL (ref 1.5–7.7)
NEUTROPHILS NFR BLD AUTO: 41.2 %
OSMOLALITY SERPL CALC.SUM OF ELEC: 289 MOSM/KG (ref 275–295)
PLATELET # BLD AUTO: 250 10(3)UL (ref 150–450)
POTASSIUM SERPL-SCNC: 4 MMOL/L (ref 3.5–5.1)
PROT SERPL-MCNC: 6.5 G/DL (ref 6.4–8.2)
RBC # BLD AUTO: 4.58 X10(6)UL
SODIUM SERPL-SCNC: 140 MMOL/L (ref 136–145)
WBC # BLD AUTO: 4.9 X10(3) UL (ref 4–11)

## 2022-06-02 PROCEDURE — 80053 COMPREHEN METABOLIC PANEL: CPT

## 2022-06-02 PROCEDURE — 96411 CHEMO IV PUSH ADDL DRUG: CPT

## 2022-06-02 PROCEDURE — 96415 CHEMO IV INFUSION ADDL HR: CPT

## 2022-06-02 PROCEDURE — 96375 TX/PRO/DX INJ NEW DRUG ADDON: CPT

## 2022-06-02 PROCEDURE — 85025 COMPLETE CBC W/AUTO DIFF WBC: CPT

## 2022-06-02 PROCEDURE — 96413 CHEMO IV INFUSION 1 HR: CPT

## 2022-06-02 PROCEDURE — 99215 OFFICE O/P EST HI 40 MIN: CPT | Performed by: NURSE PRACTITIONER

## 2022-06-02 PROCEDURE — S0028 INJECTION, FAMOTIDINE, 20 MG: HCPCS

## 2022-06-02 RX ORDER — ACETAMINOPHEN 325 MG/1
650 TABLET ORAL ONCE
Status: COMPLETED | OUTPATIENT
Start: 2022-06-02 | End: 2022-06-02

## 2022-06-02 RX ORDER — DIPHENHYDRAMINE HCL 50 MG
50 CAPSULE ORAL ONCE
Status: COMPLETED | OUTPATIENT
Start: 2022-06-02 | End: 2022-06-02

## 2022-06-02 RX ORDER — DIPHENHYDRAMINE HCL 50 MG
50 CAPSULE ORAL ONCE
Status: CANCELLED
Start: 2022-06-02 | End: 2022-06-02

## 2022-06-02 RX ORDER — FAMOTIDINE 10 MG/ML
INJECTION, SOLUTION INTRAVENOUS
Status: COMPLETED
Start: 2022-06-02 | End: 2022-06-02

## 2022-06-02 RX ORDER — FAMOTIDINE 10 MG/ML
20 INJECTION, SOLUTION INTRAVENOUS ONCE
Status: COMPLETED | OUTPATIENT
Start: 2022-06-02 | End: 2022-06-02

## 2022-06-02 RX ORDER — DIPHENHYDRAMINE HCL 50 MG
CAPSULE ORAL
Status: COMPLETED
Start: 2022-06-02 | End: 2022-06-02

## 2022-06-02 RX ORDER — SODIUM CHLORIDE 9 MG/ML
10 INJECTION INTRAVENOUS ONCE
OUTPATIENT
Start: 2022-06-02

## 2022-06-02 RX ORDER — HEPARIN SODIUM (PORCINE) LOCK FLUSH IV SOLN 100 UNIT/ML 100 UNIT/ML
5 SOLUTION INTRAVENOUS ONCE
Status: COMPLETED | OUTPATIENT
Start: 2022-06-02 | End: 2022-06-02

## 2022-06-02 RX ORDER — DIPHENHYDRAMINE HCL 50 MG
50 CAPSULE ORAL ONCE
Status: CANCELLED
Start: 2022-06-03 | End: 2022-06-03

## 2022-06-02 RX ORDER — ACETAMINOPHEN 325 MG/1
TABLET ORAL
Status: COMPLETED
Start: 2022-06-02 | End: 2022-06-02

## 2022-06-02 RX ORDER — FAMOTIDINE 10 MG/ML
20 INJECTION, SOLUTION INTRAVENOUS ONCE
Status: CANCELLED
Start: 2022-06-03 | End: 2022-06-03

## 2022-06-02 RX ORDER — ACETAMINOPHEN 325 MG/1
650 TABLET ORAL ONCE
Status: CANCELLED | OUTPATIENT
Start: 2022-06-02

## 2022-06-02 RX ORDER — FAMOTIDINE 10 MG/ML
20 INJECTION, SOLUTION INTRAVENOUS ONCE
Status: CANCELLED
Start: 2022-06-02 | End: 2022-06-02

## 2022-06-02 RX ORDER — HEPARIN SODIUM (PORCINE) LOCK FLUSH IV SOLN 100 UNIT/ML 100 UNIT/ML
SOLUTION INTRAVENOUS
Status: COMPLETED
Start: 2022-06-02 | End: 2022-06-02

## 2022-06-02 RX ORDER — HEPARIN SODIUM (PORCINE) LOCK FLUSH IV SOLN 100 UNIT/ML 100 UNIT/ML
5 SOLUTION INTRAVENOUS ONCE
Status: CANCELLED | OUTPATIENT
Start: 2022-06-02

## 2022-06-02 RX ORDER — DIPHENHYDRAMINE HCL 50 MG
50 CAPSULE ORAL ONCE
Status: CANCELLED | OUTPATIENT
Start: 2022-06-02

## 2022-06-02 RX ADMIN — DIPHENHYDRAMINE HCL 50 MG: 50 MG CAPSULE ORAL at 11:07:00

## 2022-06-02 RX ADMIN — ACETAMINOPHEN 650 MG: 325 TABLET ORAL at 11:07:00

## 2022-06-02 RX ADMIN — HEPARIN SODIUM (PORCINE) LOCK FLUSH IV SOLN 100 UNIT/ML 500 UNITS: 100 SOLUTION INTRAVENOUS at 13:45:00

## 2022-06-02 RX ADMIN — FAMOTIDINE 20 MG: 10 INJECTION, SOLUTION INTRAVENOUS at 11:07:00

## 2022-06-02 NOTE — PROGRESS NOTES
Pt here for C5 D1 Bendeka/Rituxan (Rapid Rate). Arrives Ambulating independently. Modifications in dose or schedule: No  Patient states overall she has been feeling well. She does not have any complaints, here today with her son. Premeds given - PO benadryl, IV pepcid, PO Tylenol, zofran/dex    Patient denies possible pregnancy since last therapy cycle: Denies    Port previously accessed from lab before MDV, positive blood return noted. Labs appropriate for treatment. Frequency of blood return and site check throughout administration: Prior to administration and At completion of therapy. PORT remained accessed for infusion tomorrow. Patient scheduled for  next infusion appointments- did not want AVS printout - will use MyChart.      Discharged with future appointments scheduled, Ambulating independently, accompanied by:Self    Outpatient Oncology Care Plan  Problem list:  fatigue  knowledge deficit  Problems related to:    chemotherapy  side effect of treatment  Interventions:  maintain safe environment  chemotherapy teaching  promoted rest  provided general teaching  Expected outcomes:  adequate sleep/rest  optimal lab values  safe in environment  understands plan of care  Progress towards outcome:  making progress    Education Record    Learner:  Patient and Spouse  Barriers / Limitations:  None  Method:  Discussion and Printed material  Outcome:  Observed demonstration  Comments:

## 2022-06-03 ENCOUNTER — OFFICE VISIT (OUTPATIENT)
Dept: HEMATOLOGY/ONCOLOGY | Facility: HOSPITAL | Age: 47
End: 2022-06-03
Attending: NURSE PRACTITIONER
Payer: COMMERCIAL

## 2022-06-03 VITALS
TEMPERATURE: 98 F | SYSTOLIC BLOOD PRESSURE: 113 MMHG | RESPIRATION RATE: 16 BRPM | OXYGEN SATURATION: 97 % | HEART RATE: 103 BPM | DIASTOLIC BLOOD PRESSURE: 88 MMHG

## 2022-06-03 DIAGNOSIS — C82.13 FOLLICULAR LYMPHOMA GRADE II OF INTRA-ABDOMINAL LYMPH NODES (HCC): ICD-10-CM

## 2022-06-03 DIAGNOSIS — Z45.2 ENCOUNTER FOR CENTRAL LINE CARE: ICD-10-CM

## 2022-06-03 DIAGNOSIS — Z51.81 MEDICATION MONITORING ENCOUNTER: Primary | ICD-10-CM

## 2022-06-03 PROCEDURE — 96375 TX/PRO/DX INJ NEW DRUG ADDON: CPT

## 2022-06-03 PROCEDURE — 96409 CHEMO IV PUSH SNGL DRUG: CPT

## 2022-06-03 PROCEDURE — S0028 INJECTION, FAMOTIDINE, 20 MG: HCPCS

## 2022-06-03 RX ORDER — HEPARIN SODIUM (PORCINE) LOCK FLUSH IV SOLN 100 UNIT/ML 100 UNIT/ML
SOLUTION INTRAVENOUS
Status: COMPLETED
Start: 2022-06-03 | End: 2022-06-03

## 2022-06-03 RX ORDER — FAMOTIDINE 10 MG/ML
INJECTION, SOLUTION INTRAVENOUS
Status: COMPLETED
Start: 2022-06-03 | End: 2022-06-03

## 2022-06-03 RX ORDER — FAMOTIDINE 10 MG/ML
20 INJECTION, SOLUTION INTRAVENOUS ONCE
Status: COMPLETED | OUTPATIENT
Start: 2022-06-03 | End: 2022-06-03

## 2022-06-03 RX ORDER — HEPARIN SODIUM (PORCINE) LOCK FLUSH IV SOLN 100 UNIT/ML 100 UNIT/ML
5 SOLUTION INTRAVENOUS ONCE
OUTPATIENT
Start: 2022-06-03

## 2022-06-03 RX ORDER — HEPARIN SODIUM (PORCINE) LOCK FLUSH IV SOLN 100 UNIT/ML 100 UNIT/ML
5 SOLUTION INTRAVENOUS ONCE
Status: COMPLETED | OUTPATIENT
Start: 2022-06-03 | End: 2022-06-03

## 2022-06-03 RX ORDER — SODIUM CHLORIDE 9 MG/ML
10 INJECTION INTRAVENOUS ONCE
OUTPATIENT
Start: 2022-06-03

## 2022-06-03 RX ORDER — DIPHENHYDRAMINE HCL 50 MG
CAPSULE ORAL
Status: COMPLETED
Start: 2022-06-03 | End: 2022-06-03

## 2022-06-03 RX ORDER — DIPHENHYDRAMINE HCL 50 MG
50 CAPSULE ORAL ONCE
Status: COMPLETED | OUTPATIENT
Start: 2022-06-03 | End: 2022-06-03

## 2022-06-03 RX ADMIN — DIPHENHYDRAMINE HCL 50 MG: 50 MG CAPSULE ORAL at 09:45:00

## 2022-06-03 RX ADMIN — FAMOTIDINE 20 MG: 10 INJECTION, SOLUTION INTRAVENOUS at 09:44:00

## 2022-06-03 RX ADMIN — HEPARIN SODIUM (PORCINE) LOCK FLUSH IV SOLN 100 UNIT/ML 500 UNITS: 100 SOLUTION INTRAVENOUS at 10:42:00

## 2022-06-03 NOTE — PROGRESS NOTES
Pt here for C5 D2 Bendeka. Arrives Ambulating independently. States she is feeling ok, some redness noted to face, pt states this always occurs on day 2 and MD is aware. Also stated she has been having trouble sleeping still - Peter Mckeon NP notified. Modifications in dose or schedule: No    Premeds given - PO benadryl, IV pepcid, PO Tylenol, zofran/dex    Patient denies possible pregnancy since last therapy cycle: Denies    Port accessed from previous day - present blood return noted. Frequency of blood return and site check throughout administration: Prior to administration and At completion of therapy. Port deaccessed and heparin locked - site covered with gauze and paper tape.  Discharged with future appointments scheduled, Ambulating independently, accompanied by:Self    Outpatient Oncology Care Plan  Problem list:  fatigue  knowledge deficit  Problems related to:    chemotherapy  side effect of treatment  Interventions:  maintain safe environment  chemotherapy teaching  promoted rest  provided general teaching  Expected outcomes:  adequate sleep/rest  optimal lab values  safe in environment  understands plan of care  Progress towards outcome:  making progress    Education Record    Learner:  Patient and Spouse  Barriers / Limitations:  None  Method:  Discussion and Printed material  Outcome:  Observed demonstration  Comments:

## 2022-06-23 RX ORDER — MONTELUKAST SODIUM 10 MG/1
TABLET ORAL
Qty: 30 TABLET | Refills: 1 | Status: SHIPPED | OUTPATIENT
Start: 2022-06-23 | End: 2022-07-25

## 2022-06-30 ENCOUNTER — OFFICE VISIT (OUTPATIENT)
Dept: HEMATOLOGY/ONCOLOGY | Facility: HOSPITAL | Age: 47
End: 2022-06-30
Attending: NURSE PRACTITIONER
Payer: COMMERCIAL

## 2022-06-30 ENCOUNTER — HOSPITAL ENCOUNTER (OUTPATIENT)
Dept: ULTRASOUND IMAGING | Facility: HOSPITAL | Age: 47
Discharge: HOME | End: 2022-06-30
Attending: NURSE PRACTITIONER
Payer: COMMERCIAL

## 2022-06-30 VITALS
DIASTOLIC BLOOD PRESSURE: 81 MMHG | RESPIRATION RATE: 16 BRPM | SYSTOLIC BLOOD PRESSURE: 137 MMHG | WEIGHT: 210 LBS | BODY MASS INDEX: 37 KG/M2 | HEART RATE: 84 BPM | TEMPERATURE: 99 F | OXYGEN SATURATION: 98 %

## 2022-06-30 VITALS
OXYGEN SATURATION: 98 % | SYSTOLIC BLOOD PRESSURE: 137 MMHG | TEMPERATURE: 98 F | HEIGHT: 63 IN | WEIGHT: 210 LBS | BODY MASS INDEX: 37.21 KG/M2 | DIASTOLIC BLOOD PRESSURE: 78 MMHG | HEART RATE: 82 BPM | RESPIRATION RATE: 16 BRPM

## 2022-06-30 DIAGNOSIS — C82.13 FOLLICULAR LYMPHOMA GRADE II OF INTRA-ABDOMINAL LYMPH NODES (HCC): ICD-10-CM

## 2022-06-30 DIAGNOSIS — Z95.828 PORT-A-CATH IN PLACE: ICD-10-CM

## 2022-06-30 DIAGNOSIS — Z45.2 ENCOUNTER FOR CENTRAL LINE CARE: ICD-10-CM

## 2022-06-30 DIAGNOSIS — C82.13 FOLLICULAR LYMPHOMA GRADE II OF INTRA-ABDOMINAL LYMPH NODES (HCC): Primary | ICD-10-CM

## 2022-06-30 DIAGNOSIS — Z51.81 MEDICATION MONITORING ENCOUNTER: Primary | ICD-10-CM

## 2022-06-30 DIAGNOSIS — Z09 CHEMOTHERAPY FOLLOW-UP EXAMINATION: ICD-10-CM

## 2022-06-30 LAB
ALBUMIN SERPL-MCNC: 3.6 G/DL (ref 3.4–5)
ALBUMIN/GLOB SERPL: 1.2 {RATIO} (ref 1–2)
ALP LIVER SERPL-CCNC: 59 U/L
ALT SERPL-CCNC: 71 U/L
ANION GAP SERPL CALC-SCNC: 7 MMOL/L (ref 0–18)
AST SERPL-CCNC: 46 U/L (ref 15–37)
BASOPHILS # BLD AUTO: 0.03 X10(3) UL (ref 0–0.2)
BASOPHILS NFR BLD AUTO: 0.7 %
BILIRUB SERPL-MCNC: 0.8 MG/DL (ref 0.1–2)
BUN BLD-MCNC: 12 MG/DL (ref 7–18)
BUN/CREAT SERPL: 17.4 (ref 10–20)
CALCIUM BLD-MCNC: 8.4 MG/DL (ref 8.5–10.1)
CHLORIDE SERPL-SCNC: 110 MMOL/L (ref 98–112)
CO2 SERPL-SCNC: 24 MMOL/L (ref 21–32)
CREAT BLD-MCNC: 0.69 MG/DL
DEPRECATED RDW RBC AUTO: 39.7 FL (ref 35.1–46.3)
EOSINOPHIL # BLD AUTO: 0.13 X10(3) UL (ref 0–0.7)
EOSINOPHIL NFR BLD AUTO: 2.9 %
ERYTHROCYTE [DISTWIDTH] IN BLOOD BY AUTOMATED COUNT: 12.7 % (ref 11–15)
GLOBULIN PLAS-MCNC: 3.1 G/DL (ref 2.8–4.4)
GLUCOSE BLD-MCNC: 101 MG/DL (ref 70–99)
HCT VFR BLD AUTO: 39.7 %
HGB BLD-MCNC: 13.2 G/DL
IMM GRANULOCYTES # BLD AUTO: 0.04 X10(3) UL (ref 0–1)
IMM GRANULOCYTES NFR BLD: 0.9 %
LYMPHOCYTES # BLD AUTO: 0.76 X10(3) UL (ref 1–4)
LYMPHOCYTES NFR BLD AUTO: 16.9 %
MCH RBC QN AUTO: 28.6 PG (ref 26–34)
MCHC RBC AUTO-ENTMCNC: 33.2 G/DL (ref 31–37)
MCV RBC AUTO: 86.1 FL
MONOCYTES # BLD AUTO: 1.15 X10(3) UL (ref 0.1–1)
MONOCYTES NFR BLD AUTO: 25.5 %
NEUTROPHILS # BLD AUTO: 2.4 X10 (3) UL (ref 1.5–7.7)
NEUTROPHILS # BLD AUTO: 2.4 X10(3) UL (ref 1.5–7.7)
NEUTROPHILS NFR BLD AUTO: 53.1 %
OSMOLALITY SERPL CALC.SUM OF ELEC: 292 MOSM/KG (ref 275–295)
PLATELET # BLD AUTO: 265 10(3)UL (ref 150–450)
POTASSIUM SERPL-SCNC: 4 MMOL/L (ref 3.5–5.1)
PROT SERPL-MCNC: 6.7 G/DL (ref 6.4–8.2)
RBC # BLD AUTO: 4.61 X10(6)UL
SODIUM SERPL-SCNC: 141 MMOL/L (ref 136–145)
WBC # BLD AUTO: 4.5 X10(3) UL (ref 4–11)

## 2022-06-30 PROCEDURE — 96411 CHEMO IV PUSH ADDL DRUG: CPT

## 2022-06-30 PROCEDURE — 96413 CHEMO IV INFUSION 1 HR: CPT

## 2022-06-30 PROCEDURE — 85025 COMPLETE CBC W/AUTO DIFF WBC: CPT

## 2022-06-30 PROCEDURE — 96415 CHEMO IV INFUSION ADDL HR: CPT

## 2022-06-30 PROCEDURE — 80053 COMPREHEN METABOLIC PANEL: CPT

## 2022-06-30 PROCEDURE — 99215 OFFICE O/P EST HI 40 MIN: CPT | Performed by: NURSE PRACTITIONER

## 2022-06-30 PROCEDURE — 93971 EXTREMITY STUDY: CPT | Performed by: NURSE PRACTITIONER

## 2022-06-30 PROCEDURE — S0028 INJECTION, FAMOTIDINE, 20 MG: HCPCS

## 2022-06-30 PROCEDURE — 96375 TX/PRO/DX INJ NEW DRUG ADDON: CPT

## 2022-06-30 RX ORDER — FAMOTIDINE 10 MG/ML
INJECTION, SOLUTION INTRAVENOUS
Status: DISPENSED
Start: 2022-06-30 | End: 2022-06-30

## 2022-06-30 RX ORDER — FAMOTIDINE 10 MG/ML
20 INJECTION, SOLUTION INTRAVENOUS ONCE
Status: COMPLETED | OUTPATIENT
Start: 2022-06-30 | End: 2022-06-30

## 2022-06-30 RX ORDER — FAMOTIDINE 10 MG/ML
20 INJECTION, SOLUTION INTRAVENOUS ONCE
Status: CANCELLED
Start: 2022-07-01 | End: 2022-07-01

## 2022-06-30 RX ORDER — DIPHENHYDRAMINE HCL 50 MG
50 CAPSULE ORAL ONCE
Status: CANCELLED | OUTPATIENT
Start: 2022-06-30

## 2022-06-30 RX ORDER — DIPHENHYDRAMINE HCL 50 MG
50 CAPSULE ORAL ONCE
Status: CANCELLED
Start: 2022-07-01 | End: 2022-07-01

## 2022-06-30 RX ORDER — ACETAMINOPHEN 325 MG/1
650 TABLET ORAL ONCE
Status: COMPLETED | OUTPATIENT
Start: 2022-06-30 | End: 2022-06-30

## 2022-06-30 RX ORDER — ACETAMINOPHEN 325 MG/1
650 TABLET ORAL ONCE
Status: CANCELLED | OUTPATIENT
Start: 2022-06-30

## 2022-06-30 RX ORDER — ACETAMINOPHEN 325 MG/1
TABLET ORAL
Status: DISPENSED
Start: 2022-06-30 | End: 2022-06-30

## 2022-06-30 RX ORDER — DIPHENHYDRAMINE HCL 50 MG
CAPSULE ORAL
Status: DISPENSED
Start: 2022-06-30 | End: 2022-06-30

## 2022-06-30 RX ORDER — DIPHENHYDRAMINE HCL 50 MG
50 CAPSULE ORAL ONCE
Status: CANCELLED
Start: 2022-06-30 | End: 2022-06-30

## 2022-06-30 RX ORDER — HEPARIN SODIUM (PORCINE) LOCK FLUSH IV SOLN 100 UNIT/ML 100 UNIT/ML
SOLUTION INTRAVENOUS
Status: DISPENSED
Start: 2022-06-30 | End: 2022-06-30

## 2022-06-30 RX ORDER — FAMOTIDINE 10 MG/ML
20 INJECTION, SOLUTION INTRAVENOUS ONCE
Status: CANCELLED
Start: 2022-06-30 | End: 2022-06-30

## 2022-06-30 RX ORDER — DIPHENHYDRAMINE HCL 50 MG
50 CAPSULE ORAL ONCE
Status: COMPLETED | OUTPATIENT
Start: 2022-06-30 | End: 2022-06-30

## 2022-06-30 RX ADMIN — DIPHENHYDRAMINE HCL 50 MG: 50 MG CAPSULE ORAL at 09:56:00

## 2022-06-30 RX ADMIN — FAMOTIDINE 20 MG: 10 INJECTION, SOLUTION INTRAVENOUS at 09:57:00

## 2022-06-30 RX ADMIN — ACETAMINOPHEN 650 MG: 325 TABLET ORAL at 09:56:00

## 2022-06-30 NOTE — PROGRESS NOTES
Pt here for C6 D1 Bendeka/Rituxan (Rapid Rate). Arrives Ambulating independently. Modifications in dose or schedule: No    Patient states overall she has been feeling well. She does not have any complaints, here today with her son. Premeds given - PO benadryl, IV pepcid, PO Tylenol, zofran/dex    Patient denies possible pregnancy since last therapy cycle: Denies    Port previously accessed from lab before MDV, positive blood return noted. Labs appropriate for treatment. Frequency of blood return and site check throughout administration: Prior to administration and At completion of therapy. PORT remained accessed for infusion tomorrow.        Discharged with future appointments scheduled, Ambulating independently, accompanied by:Self    Outpatient Oncology Care Plan  Problem list:  fatigue  knowledge deficit  Problems related to:    chemotherapy  side effect of treatment  Interventions:  maintain safe environment  chemotherapy teaching  promoted rest  provided general teaching  Expected outcomes:  adequate sleep/rest  optimal lab values  safe in environment  understands plan of care  Progress towards outcome:  making progress    Education Record    Learner:  Patient and Spouse  Barriers / Limitations:  None  Method:  Discussion and Printed material  Outcome:  Observed demonstration  Comments:

## 2022-07-01 ENCOUNTER — OFFICE VISIT (OUTPATIENT)
Dept: HEMATOLOGY/ONCOLOGY | Facility: HOSPITAL | Age: 47
End: 2022-07-01
Attending: NURSE PRACTITIONER
Payer: COMMERCIAL

## 2022-07-01 VITALS
HEART RATE: 112 BPM | TEMPERATURE: 98 F | DIASTOLIC BLOOD PRESSURE: 76 MMHG | OXYGEN SATURATION: 95 % | SYSTOLIC BLOOD PRESSURE: 121 MMHG | RESPIRATION RATE: 16 BRPM

## 2022-07-01 DIAGNOSIS — Z45.2 ENCOUNTER FOR CENTRAL LINE CARE: ICD-10-CM

## 2022-07-01 DIAGNOSIS — Z51.81 MEDICATION MONITORING ENCOUNTER: Primary | ICD-10-CM

## 2022-07-01 DIAGNOSIS — C82.13 FOLLICULAR LYMPHOMA GRADE II OF INTRA-ABDOMINAL LYMPH NODES (HCC): ICD-10-CM

## 2022-07-01 PROCEDURE — 96367 TX/PROPH/DG ADDL SEQ IV INF: CPT

## 2022-07-01 PROCEDURE — 96375 TX/PRO/DX INJ NEW DRUG ADDON: CPT

## 2022-07-01 PROCEDURE — 96409 CHEMO IV PUSH SNGL DRUG: CPT

## 2022-07-01 PROCEDURE — S0028 INJECTION, FAMOTIDINE, 20 MG: HCPCS

## 2022-07-01 RX ORDER — FAMOTIDINE 10 MG/ML
INJECTION, SOLUTION INTRAVENOUS
Status: COMPLETED
Start: 2022-07-01 | End: 2022-07-01

## 2022-07-01 RX ORDER — FAMOTIDINE 10 MG/ML
20 INJECTION, SOLUTION INTRAVENOUS ONCE
Status: COMPLETED | OUTPATIENT
Start: 2022-07-01 | End: 2022-07-01

## 2022-07-01 RX ORDER — HEPARIN SODIUM (PORCINE) LOCK FLUSH IV SOLN 100 UNIT/ML 100 UNIT/ML
5 SOLUTION INTRAVENOUS ONCE
OUTPATIENT
Start: 2022-07-01

## 2022-07-01 RX ORDER — HEPARIN SODIUM (PORCINE) LOCK FLUSH IV SOLN 100 UNIT/ML 100 UNIT/ML
SOLUTION INTRAVENOUS
Status: COMPLETED
Start: 2022-07-01 | End: 2022-07-01

## 2022-07-01 RX ORDER — SODIUM CHLORIDE 9 MG/ML
10 INJECTION INTRAVENOUS ONCE
OUTPATIENT
Start: 2022-07-01

## 2022-07-01 RX ORDER — DIPHENHYDRAMINE HCL 50 MG
CAPSULE ORAL
Status: COMPLETED
Start: 2022-07-01 | End: 2022-07-01

## 2022-07-01 RX ORDER — DIPHENHYDRAMINE HCL 50 MG
50 CAPSULE ORAL ONCE
Status: COMPLETED | OUTPATIENT
Start: 2022-07-01 | End: 2022-07-01

## 2022-07-01 RX ORDER — HEPARIN SODIUM (PORCINE) LOCK FLUSH IV SOLN 100 UNIT/ML 100 UNIT/ML
5 SOLUTION INTRAVENOUS ONCE
Status: COMPLETED | OUTPATIENT
Start: 2022-07-01 | End: 2022-07-01

## 2022-07-01 RX ADMIN — DIPHENHYDRAMINE HCL 50 MG: 50 MG CAPSULE ORAL at 09:23:00

## 2022-07-01 RX ADMIN — FAMOTIDINE 20 MG: 10 INJECTION, SOLUTION INTRAVENOUS at 09:26:00

## 2022-07-01 RX ADMIN — HEPARIN SODIUM (PORCINE) LOCK FLUSH IV SOLN 100 UNIT/ML 500 UNITS: 100 SOLUTION INTRAVENOUS at 09:30:00

## 2022-07-01 NOTE — PROGRESS NOTES
Pt here for C6 D2 Bendeka. Arrives Ambulating independently. Modifications in dose or schedule: No    Patient states overall she has been feeling well. She does not have any complaints, here today with her . Premeds given - PO benadryl, IV pepcid,  Zofran/dex. States she took the Montelukast at home this morning. Per [facility] APRN pt will take Xyzal and Montelukast through the week and then can stop. Patient denies possible pregnancy since last therapy cycle: Denies    Port previously accessed from lab before MDV, positive blood return noted. Labs appropriate for treatment. PORT left accessed for Tx today. Frequency of blood return and site check throughout administration: Prior to administration and At completion of therapy. PORT flushed, heparin locked and de-accessed. Discharged with future appointments scheduled, Ambulating independently, accompanied by: . Outpatient Oncology Care Plan  Problem list:  fatigue  knowledge deficit  Problems related to:    chemotherapy  side effect of treatment  Interventions:  maintain safe environment  chemotherapy teaching  promoted rest  provided general teaching  Expected outcomes:  adequate sleep/rest  optimal lab values  safe in environment  understands plan of care  Progress towards outcome:  making progress    Education Record    Learner:  Patient and Spouse  Barriers / Limitations:  None  Method:  Discussion and Printed material  Outcome:  Observed demonstration  Comments: Patient completed 6 Cycled of Chemotherapy. Has a PET schedule for 7/25 and will f/u with Dr. Robert Mendez post scan.

## 2022-07-20 ENCOUNTER — TELEPHONE (OUTPATIENT)
Dept: HEMATOLOGY/ONCOLOGY | Facility: HOSPITAL | Age: 47
End: 2022-07-20

## 2022-07-20 NOTE — TELEPHONE ENCOUNTER
Patient called to schedule a follow up a few days after her scan. I don't see any availability. Please let me know if you have a spot for me. Thank you. Chris Tuttle

## 2022-07-25 ENCOUNTER — HOSPITAL ENCOUNTER (OUTPATIENT)
Dept: NUCLEAR MEDICINE | Facility: HOSPITAL | Age: 47
Discharge: HOME | End: 2022-07-25
Attending: NURSE PRACTITIONER
Payer: COMMERCIAL

## 2022-07-25 DIAGNOSIS — C82.13 FOLLICULAR LYMPHOMA GRADE II OF INTRA-ABDOMINAL LYMPH NODES (HCC): ICD-10-CM

## 2022-07-25 LAB — GLUCOSE BLDC GLUCOMTR-MCNC: 97 MG/DL (ref 70–99)

## 2022-07-25 PROCEDURE — 78815 PET IMAGE W/CT SKULL-THIGH: CPT | Performed by: NURSE PRACTITIONER

## 2022-07-25 PROCEDURE — 82962 GLUCOSE BLOOD TEST: CPT

## 2022-07-25 RX ORDER — MONTELUKAST SODIUM 10 MG/1
TABLET ORAL
Qty: 30 TABLET | Refills: 1 | Status: SHIPPED | OUTPATIENT
Start: 2022-07-25 | End: 2022-09-13 | Stop reason: ALTCHOICE

## 2022-08-01 ENCOUNTER — OFFICE VISIT (OUTPATIENT)
Dept: HEMATOLOGY/ONCOLOGY | Facility: HOSPITAL | Age: 47
End: 2022-08-01
Attending: INTERNAL MEDICINE
Payer: COMMERCIAL

## 2022-08-01 VITALS
TEMPERATURE: 98 F | OXYGEN SATURATION: 97 % | DIASTOLIC BLOOD PRESSURE: 61 MMHG | WEIGHT: 210 LBS | RESPIRATION RATE: 16 BRPM | HEART RATE: 84 BPM | HEIGHT: 63 IN | BODY MASS INDEX: 37.21 KG/M2 | SYSTOLIC BLOOD PRESSURE: 107 MMHG

## 2022-08-01 DIAGNOSIS — Z45.2 ENCOUNTER FOR CENTRAL LINE CARE: ICD-10-CM

## 2022-08-01 DIAGNOSIS — Z09 CHEMOTHERAPY FOLLOW-UP EXAMINATION: ICD-10-CM

## 2022-08-01 DIAGNOSIS — Z51.81 MEDICATION MONITORING ENCOUNTER: Primary | ICD-10-CM

## 2022-08-01 DIAGNOSIS — Z51.89 CONVALESCENCE FOLLOWING CHEMOTHERAPY: ICD-10-CM

## 2022-08-01 DIAGNOSIS — C82.13 FOLLICULAR LYMPHOMA GRADE II OF INTRA-ABDOMINAL LYMPH NODES (HCC): Primary | ICD-10-CM

## 2022-08-01 PROCEDURE — 96523 IRRIG DRUG DELIVERY DEVICE: CPT

## 2022-08-01 RX ORDER — HEPARIN SODIUM (PORCINE) LOCK FLUSH IV SOLN 100 UNIT/ML 100 UNIT/ML
5 SOLUTION INTRAVENOUS ONCE
Status: COMPLETED | OUTPATIENT
Start: 2022-08-01 | End: 2022-08-01

## 2022-08-01 RX ORDER — SODIUM CHLORIDE 9 MG/ML
10 INJECTION INTRAVENOUS ONCE
OUTPATIENT
Start: 2022-08-01

## 2022-08-01 RX ORDER — HEPARIN SODIUM (PORCINE) LOCK FLUSH IV SOLN 100 UNIT/ML 100 UNIT/ML
5 SOLUTION INTRAVENOUS ONCE
OUTPATIENT
Start: 2022-08-01

## 2022-08-01 RX ADMIN — HEPARIN SODIUM (PORCINE) LOCK FLUSH IV SOLN 100 UNIT/ML 500 UNITS: 100 SOLUTION INTRAVENOUS at 09:07:00

## 2022-08-20 ENCOUNTER — NURSE TRIAGE (OUTPATIENT)
Dept: INTERNAL MEDICINE CLINIC | Facility: CLINIC | Age: 47
End: 2022-08-20

## 2022-08-23 ENCOUNTER — OFFICE VISIT (OUTPATIENT)
Dept: INTERNAL MEDICINE CLINIC | Facility: CLINIC | Age: 47
End: 2022-08-23
Payer: COMMERCIAL

## 2022-08-23 VITALS
BODY MASS INDEX: 36.82 KG/M2 | HEART RATE: 90 BPM | RESPIRATION RATE: 18 BRPM | WEIGHT: 207.81 LBS | DIASTOLIC BLOOD PRESSURE: 82 MMHG | HEIGHT: 63 IN | SYSTOLIC BLOOD PRESSURE: 116 MMHG

## 2022-08-23 DIAGNOSIS — K64.4 EXTERNAL HEMORRHOID, BLEEDING: ICD-10-CM

## 2022-08-23 DIAGNOSIS — K62.5 RECTAL BLEEDING: Primary | ICD-10-CM

## 2022-08-23 PROBLEM — Z00.00 PHYSICAL EXAM, ANNUAL: Status: RESOLVED | Noted: 2017-02-14 | Resolved: 2022-08-23

## 2022-08-23 PROCEDURE — 3074F SYST BP LT 130 MM HG: CPT | Performed by: INTERNAL MEDICINE

## 2022-08-23 PROCEDURE — 3079F DIAST BP 80-89 MM HG: CPT | Performed by: INTERNAL MEDICINE

## 2022-08-23 PROCEDURE — 99214 OFFICE O/P EST MOD 30 MIN: CPT | Performed by: INTERNAL MEDICINE

## 2022-08-23 PROCEDURE — 3008F BODY MASS INDEX DOCD: CPT | Performed by: INTERNAL MEDICINE

## 2022-09-02 RX ORDER — ROSUVASTATIN CALCIUM 20 MG/1
20 TABLET, COATED ORAL NIGHTLY
Qty: 90 TABLET | Refills: 1 | Status: SHIPPED | OUTPATIENT
Start: 2022-09-02

## 2022-09-02 NOTE — TELEPHONE ENCOUNTER
Please review; protocol failed/no protocol. Requested Prescriptions   Pending Prescriptions Disp Refills    ROSUVASTATIN 20 MG Oral Tab [Pharmacy Med Name: ROSUVASTATIN CALCIUM 20 MG TAB] 90 tablet 0     Sig: TAKE 1 TABLET BY MOUTH EVERY DAY AT NIGHT        Cholesterol Medication Protocol Failed - 9/2/2022 10:42 AM        Failed - LDL in past 12 months        Failed - Last LDL < 130     Lab Results   Component Value Date     (H) 06/09/2020               Passed - ALT in past 12 months        Passed - Last ALT < 80       Lab Results   Component Value Date    ALT 71 (H) 06/30/2022             Passed - In person appointment or virtual visit in the past 12 mos or appointment in next 3 mos       Recent Outpatient Visits              1 week ago Rectal bleeding    Blondell Like, MD    Office Visit    1 month ago Medication monitoring encounter    [address] - Infusion    Nurse Only    1 month ago Follicular lymphoma grade II of intra-abdominal lymph nodes [facility])    [facility] Hemat[facility] Oncology Maria G Will MD    Office Visit    2 months ago Medication monitoring encounter    [address] Visit    2 months ago Medication monitoring encounter    [address] Visit     Future Appointments         Provider Department Appt Notes    In 1 week [facility] Hematology Oncology SURVIVORSHIP VISIT. CL    In 1 week Pepe Sheffield MD [employer], [address] physical  last px 4/64/42  (policy informed)    In 2 months [address] - Infusion port flush labs    In 2 months C[address] Hematology Oncology Follow-up  ?survivorship clinic                      Recent Outpatient Visits              1 week ago Rectal bleeding    [place], [address], [place], [place], [place]    Office Visit    1 month ago Medication monitoring encounter    [address] - Infusion    Nurse Only    1 month ago Follicular lymphoma grade II of intra-abdominal lymph nodes [facility])    [facility] Hematology Oncology Álvaro Negro MD    Office Visit    2 months ago Medication monitoring encounter    [address] Visit    2 months ago Medication monitoring encounter    [address] Visit             Future Appointments         Provider Department Appt Notes    In 1 week [facility] Hematology Oncology SURVIVORSHIP VISIT. CL    In 1 week Leti Gatica MD [facility], [address] [facility] physical  last px 6/79/28  (policy informed)    In 2 months [facility] [address] - Infusion port flush labs    In 2 months Caden Garcia 19 Hematology Oncology Follow-up  ?survivorship clinic

## 2022-09-12 ENCOUNTER — APPOINTMENT (OUTPATIENT)
Dept: HEMATOLOGY/ONCOLOGY | Facility: HOSPITAL | Age: 47
End: 2022-09-12
Attending: INTERNAL MEDICINE
Payer: COMMERCIAL

## 2022-09-13 ENCOUNTER — LAB ENCOUNTER (OUTPATIENT)
Dept: LAB | Age: 47
End: 2022-09-13
Attending: INTERNAL MEDICINE
Payer: COMMERCIAL

## 2022-09-13 ENCOUNTER — APPOINTMENT (OUTPATIENT)
Dept: HEMATOLOGY/ONCOLOGY | Facility: HOSPITAL | Age: 47
End: 2022-09-13
Attending: NURSE PRACTITIONER
Payer: COMMERCIAL

## 2022-09-13 ENCOUNTER — OFFICE VISIT (OUTPATIENT)
Dept: INTERNAL MEDICINE CLINIC | Facility: CLINIC | Age: 47
End: 2022-09-13
Payer: COMMERCIAL

## 2022-09-13 VITALS
BODY MASS INDEX: 37.74 KG/M2 | WEIGHT: 213 LBS | SYSTOLIC BLOOD PRESSURE: 134 MMHG | HEIGHT: 63 IN | DIASTOLIC BLOOD PRESSURE: 82 MMHG | HEART RATE: 98 BPM

## 2022-09-13 DIAGNOSIS — C82.13 FOLLICULAR LYMPHOMA GRADE II OF INTRA-ABDOMINAL LYMPH NODES (HCC): ICD-10-CM

## 2022-09-13 DIAGNOSIS — K62.5 RECTAL BLEEDING: ICD-10-CM

## 2022-09-13 DIAGNOSIS — G57.93 NEUROPATHY OF BOTH FEET: ICD-10-CM

## 2022-09-13 DIAGNOSIS — K64.4 EXTERNAL HEMORRHOID, BLEEDING: ICD-10-CM

## 2022-09-13 DIAGNOSIS — Z00.00 ANNUAL PHYSICAL EXAM: Primary | ICD-10-CM

## 2022-09-13 DIAGNOSIS — Z12.11 COLON CANCER SCREENING: ICD-10-CM

## 2022-09-13 DIAGNOSIS — E78.00 PURE HYPERCHOLESTEROLEMIA: ICD-10-CM

## 2022-09-13 DIAGNOSIS — Z00.00 ANNUAL PHYSICAL EXAM: ICD-10-CM

## 2022-09-13 DIAGNOSIS — E28.2 POLYCYSTIC OVARIES: ICD-10-CM

## 2022-09-13 LAB
ALBUMIN SERPL-MCNC: 3.8 G/DL (ref 3.4–5)
ALBUMIN/GLOB SERPL: 1.1 {RATIO} (ref 1–2)
ALP LIVER SERPL-CCNC: 83 U/L
ALT SERPL-CCNC: 122 U/L
ANION GAP SERPL CALC-SCNC: 6 MMOL/L (ref 0–18)
AST SERPL-CCNC: 74 U/L (ref 15–37)
BASOPHILS # BLD AUTO: 0.03 X10(3) UL (ref 0–0.2)
BASOPHILS NFR BLD AUTO: 0.6 %
BILIRUB SERPL-MCNC: 0.6 MG/DL (ref 0.1–2)
BUN BLD-MCNC: 12 MG/DL (ref 7–18)
BUN/CREAT SERPL: 14.3 (ref 10–20)
CALCIUM BLD-MCNC: 8.6 MG/DL (ref 8.5–10.1)
CHLORIDE SERPL-SCNC: 109 MMOL/L (ref 98–112)
CHOLEST SERPL-MCNC: 172 MG/DL (ref ?–200)
CO2 SERPL-SCNC: 27 MMOL/L (ref 21–32)
CREAT BLD-MCNC: 0.84 MG/DL
DEPRECATED RDW RBC AUTO: 40 FL (ref 35.1–46.3)
EOSINOPHIL # BLD AUTO: 0.12 X10(3) UL (ref 0–0.7)
EOSINOPHIL NFR BLD AUTO: 2.4 %
ERYTHROCYTE [DISTWIDTH] IN BLOOD BY AUTOMATED COUNT: 12.5 % (ref 11–15)
FASTING PATIENT LIPID ANSWER: NO
FASTING STATUS PATIENT QL REPORTED: NO
FOLATE SERPL-MCNC: 19.7 NG/ML (ref 8.7–?)
GFR SERPLBLD BASED ON 1.73 SQ M-ARVRAT: 86 ML/MIN/1.73M2 (ref 60–?)
GLOBULIN PLAS-MCNC: 3.4 G/DL (ref 2.8–4.4)
GLUCOSE BLD-MCNC: 110 MG/DL (ref 70–99)
HCT VFR BLD AUTO: 41.6 %
HDLC SERPL-MCNC: 47 MG/DL (ref 40–59)
HGB BLD-MCNC: 14 G/DL
IMM GRANULOCYTES # BLD AUTO: 0.01 X10(3) UL (ref 0–1)
IMM GRANULOCYTES NFR BLD: 0.2 %
LDLC SERPL CALC-MCNC: 89 MG/DL (ref ?–100)
LYMPHOCYTES # BLD AUTO: 0.66 X10(3) UL (ref 1–4)
LYMPHOCYTES NFR BLD AUTO: 13 %
MCH RBC QN AUTO: 29.3 PG (ref 26–34)
MCHC RBC AUTO-ENTMCNC: 33.7 G/DL (ref 31–37)
MCV RBC AUTO: 87 FL
MONOCYTES # BLD AUTO: 0.84 X10(3) UL (ref 0.1–1)
MONOCYTES NFR BLD AUTO: 16.5 %
NEUTROPHILS # BLD AUTO: 3.42 X10 (3) UL (ref 1.5–7.7)
NEUTROPHILS # BLD AUTO: 3.42 X10(3) UL (ref 1.5–7.7)
NEUTROPHILS NFR BLD AUTO: 67.3 %
NONHDLC SERPL-MCNC: 125 MG/DL (ref ?–130)
OSMOLALITY SERPL CALC.SUM OF ELEC: 294 MOSM/KG (ref 275–295)
PLATELET # BLD AUTO: 247 10(3)UL (ref 150–450)
POTASSIUM SERPL-SCNC: 3.9 MMOL/L (ref 3.5–5.1)
PROT SERPL-MCNC: 7.2 G/DL (ref 6.4–8.2)
RBC # BLD AUTO: 4.78 X10(6)UL
SODIUM SERPL-SCNC: 142 MMOL/L (ref 136–145)
TRIGL SERPL-MCNC: 213 MG/DL (ref 30–149)
TSI SER-ACNC: 2.04 MIU/ML (ref 0.36–3.74)
VIT B12 SERPL-MCNC: 434 PG/ML (ref 193–986)
VIT D+METAB SERPL-MCNC: 26 NG/ML (ref 30–100)
VLDLC SERPL CALC-MCNC: 35 MG/DL (ref 0–30)
WBC # BLD AUTO: 5.1 X10(3) UL (ref 4–11)

## 2022-09-13 PROCEDURE — 3075F SYST BP GE 130 - 139MM HG: CPT | Performed by: INTERNAL MEDICINE

## 2022-09-13 PROCEDURE — 3079F DIAST BP 80-89 MM HG: CPT | Performed by: INTERNAL MEDICINE

## 2022-09-13 PROCEDURE — 82607 VITAMIN B-12: CPT

## 2022-09-13 PROCEDURE — 99396 PREV VISIT EST AGE 40-64: CPT | Performed by: INTERNAL MEDICINE

## 2022-09-13 PROCEDURE — 36415 COLL VENOUS BLD VENIPUNCTURE: CPT

## 2022-09-13 PROCEDURE — 80061 LIPID PANEL: CPT

## 2022-09-13 PROCEDURE — 80053 COMPREHEN METABOLIC PANEL: CPT

## 2022-09-13 PROCEDURE — G0438 PPPS, INITIAL VISIT: HCPCS | Performed by: INTERNAL MEDICINE

## 2022-09-13 PROCEDURE — 84443 ASSAY THYROID STIM HORMONE: CPT

## 2022-09-13 PROCEDURE — 82746 ASSAY OF FOLIC ACID SERUM: CPT

## 2022-09-13 PROCEDURE — 3008F BODY MASS INDEX DOCD: CPT | Performed by: INTERNAL MEDICINE

## 2022-09-13 PROCEDURE — 82306 VITAMIN D 25 HYDROXY: CPT

## 2022-09-13 PROCEDURE — 85025 COMPLETE CBC W/AUTO DIFF WBC: CPT

## 2022-09-13 RX ORDER — GABAPENTIN 100 MG/1
100 CAPSULE ORAL 3 TIMES DAILY
Qty: 90 CAPSULE | Refills: 0 | Status: SHIPPED | OUTPATIENT
Start: 2022-09-13

## 2022-10-07 ENCOUNTER — TELEPHONE (OUTPATIENT)
Dept: HEMATOLOGY/ONCOLOGY | Facility: HOSPITAL | Age: 47
End: 2022-10-07

## 2022-10-11 ENCOUNTER — OFFICE VISIT (OUTPATIENT)
Dept: HEMATOLOGY/ONCOLOGY | Facility: HOSPITAL | Age: 47
End: 2022-10-11
Attending: NURSE PRACTITIONER
Payer: COMMERCIAL

## 2022-10-11 DIAGNOSIS — Z71.9 COUNSELING, UNSPECIFIED: ICD-10-CM

## 2022-10-11 DIAGNOSIS — C82.13 FOLLICULAR LYMPHOMA GRADE II OF INTRA-ABDOMINAL LYMPH NODES (HCC): Primary | ICD-10-CM

## 2022-10-11 DIAGNOSIS — Z08 ENCOUNTER FOR FOLLOW-UP EXAMINATION AFTER COMPLETED TREATMENT FOR MALIGNANT NEOPLASM: ICD-10-CM

## 2022-10-11 PROCEDURE — 99215 OFFICE O/P EST HI 40 MIN: CPT | Performed by: NURSE PRACTITIONER

## 2022-10-11 PROCEDURE — 99417 PROLNG OP E/M EACH 15 MIN: CPT | Performed by: NURSE PRACTITIONER

## 2022-10-11 NOTE — PROGRESS NOTES
I met with Deidre Welch and her  for a Survivorship Clinic visit to provide a survivorship care plan (SCP) and information related to post-treatment care. 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 She received 6 cycles of Bendamustine with Rituxumab from 2/9/2022-7/1/2022. Post-treatment PET scan on 7/25/2022 showed complete response by Lugano criteria. (See Oncology Treatment Summary SCP for details). Current condition/issues: Deidre Welch reports having increased bilateral foot pain in the heel and around the back of feet. She notes pain score of \"10\" most of the time. She denies numbness or tingling. The pain moves from the left foot to right foot and sometimes is  In both feet. She has a dog-walking service and walks most days > 21338 steps in the first half of her day. She noted when on vacation recently with reduced walking, that her feet felt better. She saw her PCP, Dr. Leroy Garcia, on 9/13 and was placed on Gabapentin 100mg TID. She stopped it as it provided no relief, but now realizes she only took it once/day. She denies neuropathy symptoms in her hands. She took Advil for the foot pain but was concerned to take it consistently given her elevated liver function tests. She will be having a phone visit with her Dr. Rosa Levine tomorrow to follow-up on the foot pain. She also notes that her usual back pain has increased and she also has discomfort near her collar bone above port site. There is no noted swelling at the port site. Since finishing treatment, she also notes feeling \"bloated\" and is discouraged with weight gain. Current BMI is 38. She has a good appetite and no issues with digestion or heartburn. Her lower GI symptoms have resolved. She had LMP on 6/14/2022 and wonders if this is hormone-related.  She will see Dr. Mamadou Willard on 11/5 for gynecologic exam.  She notes feeling damon and not her usual self and attributes this to her hormone changes and the constant pain from her feet. She is worried about these changes as well as the elevated liver function tests. Survivorship Care Plan and letter: Adelaida Gonzalez was provided a hard copy of the SCP and a letter that outlined the purpose of the visit and plan. We reviewed all elements of both documents. She is aware that a letter and SCP will be sent to her PCP, Dr. Anaya Rod. Reviewed Cancer Surveillance (office visits, imaging,blood work) and that Dr. Zakia Lopes will oversee this care. She will see her on 11/2 with port flush and labs. Next imaging with CT will be due 2/2023 and Dr. Bronson Thompson will order. Reviewed Schedule of other clinic visits with Primary Care and specialists: Dr. Linda Patricia will continue to manage all general health care recommended for age and gender including cancer screening tests. She is current with her cancer screening with next cervical screening when she sees Dr. Caridad Carbajal. Reviewed concerning symptoms that she should report to any Provider. Reviewed possible late and long-term effects related to the treatment that was received. Reviewed common cancer survivor issues and resources available. Various survivorship resources were provided to use going forward as needed (see below). We focused on resources for weight loss, stress management, counseling/support and exercise. Reviewed Lifestyle/behaviors that can affect ongoing health, including the risk for the cancer coming back or developing another cancer. We reviewed importance of physical activity and a plant based diet. Adelaida Gonzalez gets a lot of walking each day with dog walking. We reviewed aerobic exercise and strength training. She admits to need for improvement in her eating habits post-treatment. She is hoping to lose weight and was interested in the weight loss program and a referral was placed. We reviewed skin care and sun safety.      Adelaida Gonzalez received a take-home folder that included the following survivorship resources:   -SCP and patient letter  -[facility] Survivorship book   -[facility] [address] in [place]-education, support groups, special programs, nutrition and exercise classes, movement classes, mind/body programs, survivorship programs, individual counseling  -Sienna Lemus - 1:1 support  -[place] [facility]-order placed  -[employer] handout-nutrition, physical activity  -ACS Cancer Screening Guidelines  -Websites: [facility] for your Life, [address] [place] [employer], [address] [place] [address] was given the opportunity to ask questions. She had a few questions and verbalized understanding of the information we discussed today. She is discouraged with how she is feeling and with the consistent foot pain. Emotional support was provided. My total time spent caring for the patient on the day of the encounter: 80 minutes (10 minutes reviewing chart, 60 minutes of face to face counseling regarding survivorship education, review of care plan and additional resources and 10 minutes of documentation). She was encouraged to call with any further questions. I will follow-up with Dr. Torie Thorne after Mounika Kumari sees her PCP. I will then contact Mounika Kumari with any feedback from Dr. Torie Thorne.   Jackson Crow, APRN

## 2022-10-12 PROBLEM — M79.672 FOOT PAIN, BILATERAL: Status: ACTIVE | Noted: 2022-10-12

## 2022-10-12 PROBLEM — M79.671 FOOT PAIN, BILATERAL: Status: ACTIVE | Noted: 2022-10-12

## 2022-10-13 ENCOUNTER — VIRTUAL PHONE E/M (OUTPATIENT)
Dept: INTERNAL MEDICINE CLINIC | Facility: CLINIC | Age: 47
End: 2022-10-13
Payer: COMMERCIAL

## 2022-10-13 DIAGNOSIS — G57.93 NEUROPATHY OF BOTH FEET: Primary | ICD-10-CM

## 2022-10-13 PROCEDURE — 99442 PHONE E/M BY PHYS 11-20 MIN: CPT | Performed by: INTERNAL MEDICINE

## 2022-10-19 ENCOUNTER — TELEPHONE (OUTPATIENT)
Dept: INTERNAL MEDICINE CLINIC | Facility: CLINIC | Age: 47
End: 2022-10-19

## 2022-10-19 DIAGNOSIS — G57.93 NEUROPATHY OF BOTH FEET: Primary | ICD-10-CM

## 2022-10-19 NOTE — TELEPHONE ENCOUNTER
Spoke with patient. Symptoms as described below. Pain is much improved but not completely gone. Dr. Umair Tran do you want to continue to titrate up?

## 2022-10-19 NOTE — TELEPHONE ENCOUNTER
Patient calling to tell Dr. Brown Day Gabapentin 3x a day has helped, not sure if Dr. Chasity Schafer wanted to increase dosage. No symptoms, only minimal pain.

## 2022-10-20 RX ORDER — GABAPENTIN 100 MG/1
200 CAPSULE ORAL 3 TIMES DAILY
Qty: 180 CAPSULE | Refills: 0 | Status: SHIPPED | OUTPATIENT
Start: 2022-10-20 | End: 2022-11-19

## 2022-10-20 NOTE — TELEPHONE ENCOUNTER
Spoke with patient, (  Name and  verified ) informed of 's  instructions below    She will increase her meds as directed     Future Appointments   Date Time Provider Paula Blank   10/26/2022  4:00 PM Stephanie Nelson MD [facility] [place]   2022  9:30 AM EM CC LAB1 [address] [place] [address] CHEMO EMO   2022 10:30 AM Christy Dodge MD [address] [place] [address] HEM ONC EMO   2022 10:30 AM Carlotta Beck DO E[facility]OBGYN [facility]     Patient scheduled for video visit. Patient advised to complete the e-check in in [facility], if active. Understands to follow the prompts and links to complete the visit. Patient advised that there may be a co-pay involved in this type of visit. Patient agreed to proceed, they understand the provider may be calling from a blocked, or unknown phone number on their caller ID and they know to answer the phone.     Best call back:  [phone]

## 2022-10-26 ENCOUNTER — TELEMEDICINE (OUTPATIENT)
Dept: INTERNAL MEDICINE CLINIC | Facility: CLINIC | Age: 47
End: 2022-10-26
Payer: COMMERCIAL

## 2022-10-26 DIAGNOSIS — G57.93 NEUROPATHY OF BOTH FEET: Primary | ICD-10-CM

## 2022-10-26 DIAGNOSIS — M54.50 CHRONIC BILATERAL LOW BACK PAIN WITHOUT SCIATICA: ICD-10-CM

## 2022-10-26 DIAGNOSIS — G89.29 CHRONIC BILATERAL LOW BACK PAIN WITHOUT SCIATICA: ICD-10-CM

## 2022-10-26 PROCEDURE — 99213 OFFICE O/P EST LOW 20 MIN: CPT | Performed by: INTERNAL MEDICINE

## 2022-10-27 ENCOUNTER — HOSPITAL ENCOUNTER (OUTPATIENT)
Dept: GENERAL RADIOLOGY | Facility: HOSPITAL | Age: 47
Discharge: HOME | End: 2022-10-27
Attending: INTERNAL MEDICINE
Payer: COMMERCIAL

## 2022-10-27 DIAGNOSIS — M54.50 CHRONIC BILATERAL LOW BACK PAIN WITHOUT SCIATICA: ICD-10-CM

## 2022-10-27 DIAGNOSIS — G89.29 CHRONIC BILATERAL LOW BACK PAIN WITHOUT SCIATICA: ICD-10-CM

## 2022-10-27 PROCEDURE — 72110 X-RAY EXAM L-2 SPINE 4/>VWS: CPT | Performed by: INTERNAL MEDICINE

## 2022-11-01 RX ORDER — SODIUM CHLORIDE 9 MG/ML
10 INJECTION INTRAVENOUS ONCE
OUTPATIENT
Start: 2022-11-01

## 2022-11-01 RX ORDER — HEPARIN SODIUM (PORCINE) LOCK FLUSH IV SOLN 100 UNIT/ML 100 UNIT/ML
5 SOLUTION INTRAVENOUS ONCE
Status: CANCELLED | OUTPATIENT
Start: 2022-11-01

## 2022-11-02 ENCOUNTER — OFFICE VISIT (OUTPATIENT)
Dept: HEMATOLOGY/ONCOLOGY | Facility: HOSPITAL | Age: 47
End: 2022-11-02
Attending: NURSE PRACTITIONER
Payer: COMMERCIAL

## 2022-11-02 ENCOUNTER — TELEMEDICINE (OUTPATIENT)
Dept: INTERNAL MEDICINE CLINIC | Facility: CLINIC | Age: 47
End: 2022-11-02
Payer: COMMERCIAL

## 2022-11-02 VITALS
SYSTOLIC BLOOD PRESSURE: 134 MMHG | TEMPERATURE: 98 F | HEART RATE: 70 BPM | OXYGEN SATURATION: 98 % | WEIGHT: 213.63 LBS | DIASTOLIC BLOOD PRESSURE: 89 MMHG | HEIGHT: 63 IN | BODY MASS INDEX: 37.85 KG/M2 | RESPIRATION RATE: 16 BRPM

## 2022-11-02 DIAGNOSIS — Z51.81 MEDICATION MONITORING ENCOUNTER: Primary | ICD-10-CM

## 2022-11-02 DIAGNOSIS — G89.29 CHRONIC BILATERAL LOW BACK PAIN WITHOUT SCIATICA: Primary | ICD-10-CM

## 2022-11-02 DIAGNOSIS — Z85.72 ENCOUNTER FOR FOLLOW-UP SURVEILLANCE OF LYMPHOMA: ICD-10-CM

## 2022-11-02 DIAGNOSIS — Z08 ENCOUNTER FOR FOLLOW-UP SURVEILLANCE OF LYMPHOMA: ICD-10-CM

## 2022-11-02 DIAGNOSIS — C82.13 FOLLICULAR LYMPHOMA GRADE II OF INTRA-ABDOMINAL LYMPH NODES (HCC): ICD-10-CM

## 2022-11-02 DIAGNOSIS — C82.13 FOLLICULAR LYMPHOMA GRADE II OF INTRA-ABDOMINAL LYMPH NODES (HCC): Primary | ICD-10-CM

## 2022-11-02 DIAGNOSIS — K64.4 EXTERNAL HEMORRHOID, BLEEDING: ICD-10-CM

## 2022-11-02 DIAGNOSIS — Z45.2 ENCOUNTER FOR CENTRAL LINE CARE: ICD-10-CM

## 2022-11-02 DIAGNOSIS — K62.5 RECTAL BLEEDING: ICD-10-CM

## 2022-11-02 DIAGNOSIS — M54.50 CHRONIC BILATERAL LOW BACK PAIN WITHOUT SCIATICA: Primary | ICD-10-CM

## 2022-11-02 LAB
ALBUMIN SERPL-MCNC: 3.6 G/DL (ref 3.4–5)
ALBUMIN/GLOB SERPL: 1.1 {RATIO} (ref 1–2)
ALP LIVER SERPL-CCNC: 73 U/L
ALT SERPL-CCNC: 99 U/L
ANION GAP SERPL CALC-SCNC: 8 MMOL/L (ref 0–18)
AST SERPL-CCNC: 59 U/L (ref 15–37)
BASOPHILS # BLD AUTO: 0.02 X10(3) UL (ref 0–0.2)
BASOPHILS NFR BLD AUTO: 0.4 %
BILIRUB SERPL-MCNC: 1 MG/DL (ref 0.1–2)
BUN BLD-MCNC: 11 MG/DL (ref 7–18)
BUN/CREAT SERPL: 14.7 (ref 10–20)
CALCIUM BLD-MCNC: 8.5 MG/DL (ref 8.5–10.1)
CHLORIDE SERPL-SCNC: 109 MMOL/L (ref 98–112)
CO2 SERPL-SCNC: 25 MMOL/L (ref 21–32)
CREAT BLD-MCNC: 0.75 MG/DL
DEPRECATED RDW RBC AUTO: 37.3 FL (ref 35.1–46.3)
EOSINOPHIL # BLD AUTO: 0.18 X10(3) UL (ref 0–0.7)
EOSINOPHIL NFR BLD AUTO: 3.9 %
ERYTHROCYTE [DISTWIDTH] IN BLOOD BY AUTOMATED COUNT: 11.9 % (ref 11–15)
GFR SERPLBLD BASED ON 1.73 SQ M-ARVRAT: 98 ML/MIN/1.73M2 (ref 60–?)
GLOBULIN PLAS-MCNC: 3.3 G/DL (ref 2.8–4.4)
GLUCOSE BLD-MCNC: 101 MG/DL (ref 70–99)
HCT VFR BLD AUTO: 41.7 %
HGB BLD-MCNC: 14.2 G/DL
IMM GRANULOCYTES # BLD AUTO: 0.02 X10(3) UL (ref 0–1)
IMM GRANULOCYTES NFR BLD: 0.4 %
LDH SERPL L TO P-CCNC: 204 U/L
LYMPHOCYTES # BLD AUTO: 0.8 X10(3) UL (ref 1–4)
LYMPHOCYTES NFR BLD AUTO: 17.2 %
MCH RBC QN AUTO: 29.3 PG (ref 26–34)
MCHC RBC AUTO-ENTMCNC: 34.1 G/DL (ref 31–37)
MCV RBC AUTO: 86 FL
MONOCYTES # BLD AUTO: 0.77 X10(3) UL (ref 0.1–1)
MONOCYTES NFR BLD AUTO: 16.5 %
NEUTROPHILS # BLD AUTO: 2.87 X10 (3) UL (ref 1.5–7.7)
NEUTROPHILS # BLD AUTO: 2.87 X10(3) UL (ref 1.5–7.7)
NEUTROPHILS NFR BLD AUTO: 61.6 %
OSMOLALITY SERPL CALC.SUM OF ELEC: 294 MOSM/KG (ref 275–295)
PLATELET # BLD AUTO: 241 10(3)UL (ref 150–450)
POTASSIUM SERPL-SCNC: 4 MMOL/L (ref 3.5–5.1)
PROT SERPL-MCNC: 6.9 G/DL (ref 6.4–8.2)
RBC # BLD AUTO: 4.85 X10(6)UL
SODIUM SERPL-SCNC: 142 MMOL/L (ref 136–145)
WBC # BLD AUTO: 4.7 X10(3) UL (ref 4–11)

## 2022-11-02 PROCEDURE — 83615 LACTATE (LD) (LDH) ENZYME: CPT

## 2022-11-02 PROCEDURE — 85025 COMPLETE CBC W/AUTO DIFF WBC: CPT

## 2022-11-02 PROCEDURE — 99213 OFFICE O/P EST LOW 20 MIN: CPT | Performed by: INTERNAL MEDICINE

## 2022-11-02 PROCEDURE — 80053 COMPREHEN METABOLIC PANEL: CPT

## 2022-11-02 PROCEDURE — 36591 DRAW BLOOD OFF VENOUS DEVICE: CPT

## 2022-11-02 RX ORDER — HEPARIN SODIUM (PORCINE) LOCK FLUSH IV SOLN 100 UNIT/ML 100 UNIT/ML
5 SOLUTION INTRAVENOUS ONCE
OUTPATIENT
Start: 2022-11-02

## 2022-11-02 RX ORDER — SODIUM CHLORIDE 9 MG/ML
10 INJECTION INTRAVENOUS ONCE
OUTPATIENT
Start: 2022-11-02

## 2022-11-02 RX ORDER — HEPARIN SODIUM (PORCINE) LOCK FLUSH IV SOLN 100 UNIT/ML 100 UNIT/ML
5 SOLUTION INTRAVENOUS ONCE
Status: COMPLETED | OUTPATIENT
Start: 2022-11-02 | End: 2022-11-02

## 2022-11-02 RX ADMIN — HEPARIN SODIUM (PORCINE) LOCK FLUSH IV SOLN 100 UNIT/ML 500 UNITS: 100 SOLUTION INTRAVENOUS at 09:41:00

## 2022-11-05 ENCOUNTER — OFFICE VISIT (OUTPATIENT)
Dept: OBGYN CLINIC | Facility: CLINIC | Age: 47
End: 2022-11-05
Payer: COMMERCIAL

## 2022-11-05 VITALS
HEART RATE: 72 BPM | BODY MASS INDEX: 38 KG/M2 | DIASTOLIC BLOOD PRESSURE: 83 MMHG | WEIGHT: 212 LBS | SYSTOLIC BLOOD PRESSURE: 128 MMHG

## 2022-11-05 DIAGNOSIS — N95.1 SYMPTOMATIC MENOPAUSAL OR FEMALE CLIMACTERIC STATES: ICD-10-CM

## 2022-11-05 DIAGNOSIS — Z12.4 SCREENING FOR MALIGNANT NEOPLASM OF CERVIX: ICD-10-CM

## 2022-11-05 DIAGNOSIS — Z01.419 ENCOUNTER FOR GYNECOLOGICAL EXAMINATION WITHOUT ABNORMAL FINDING: Primary | ICD-10-CM

## 2022-11-05 PROCEDURE — 3074F SYST BP LT 130 MM HG: CPT | Performed by: OBSTETRICS & GYNECOLOGY

## 2022-11-05 PROCEDURE — 3079F DIAST BP 80-89 MM HG: CPT | Performed by: OBSTETRICS & GYNECOLOGY

## 2022-11-07 LAB — HPV I/H RISK 1 DNA SPEC QL NAA+PROBE: NEGATIVE

## 2022-11-29 ENCOUNTER — TELEPHONE (OUTPATIENT)
Dept: PHYSICAL THERAPY | Facility: HOSPITAL | Age: 47
End: 2022-11-29

## 2022-11-30 ENCOUNTER — OFFICE VISIT (OUTPATIENT)
Dept: PHYSICAL THERAPY | Age: 47
End: 2022-11-30
Attending: INTERNAL MEDICINE
Payer: COMMERCIAL

## 2022-11-30 DIAGNOSIS — M54.50 CHRONIC BILATERAL LOW BACK PAIN WITHOUT SCIATICA: ICD-10-CM

## 2022-11-30 DIAGNOSIS — G89.29 CHRONIC BILATERAL LOW BACK PAIN WITHOUT SCIATICA: ICD-10-CM

## 2022-11-30 PROBLEM — N95.1 SYMPTOMATIC MENOPAUSAL OR FEMALE CLIMACTERIC STATES: Status: ACTIVE | Noted: 2022-11-30

## 2022-11-30 PROCEDURE — 97162 PT EVAL MOD COMPLEX 30 MIN: CPT

## 2022-11-30 PROCEDURE — 97110 THERAPEUTIC EXERCISES: CPT

## 2022-12-02 ENCOUNTER — OFFICE VISIT (OUTPATIENT)
Dept: PHYSICAL THERAPY | Age: 47
End: 2022-12-02
Attending: INTERNAL MEDICINE
Payer: COMMERCIAL

## 2022-12-02 PROCEDURE — 97140 MANUAL THERAPY 1/> REGIONS: CPT

## 2022-12-02 PROCEDURE — 97110 THERAPEUTIC EXERCISES: CPT

## 2022-12-07 ENCOUNTER — OFFICE VISIT (OUTPATIENT)
Dept: PHYSICAL THERAPY | Age: 47
End: 2022-12-07
Attending: INTERNAL MEDICINE
Payer: COMMERCIAL

## 2022-12-07 PROCEDURE — 97140 MANUAL THERAPY 1/> REGIONS: CPT

## 2022-12-07 PROCEDURE — 97110 THERAPEUTIC EXERCISES: CPT

## 2022-12-08 ENCOUNTER — OFFICE VISIT (OUTPATIENT)
Dept: PHYSICAL MEDICINE AND REHAB | Facility: CLINIC | Age: 47
End: 2022-12-08
Payer: COMMERCIAL

## 2022-12-08 VITALS
BODY MASS INDEX: 38 KG/M2 | OXYGEN SATURATION: 98 % | HEART RATE: 87 BPM | DIASTOLIC BLOOD PRESSURE: 82 MMHG | SYSTOLIC BLOOD PRESSURE: 128 MMHG | WEIGHT: 217 LBS

## 2022-12-08 DIAGNOSIS — M51.37 DDD (DEGENERATIVE DISC DISEASE), LUMBOSACRAL: Primary | ICD-10-CM

## 2022-12-08 DIAGNOSIS — M54.59 MECHANICAL LOW BACK PAIN: ICD-10-CM

## 2022-12-08 DIAGNOSIS — M47.816 FACET SYNDROME, LUMBAR: ICD-10-CM

## 2022-12-08 DIAGNOSIS — M99.9 BIOMECHANICAL LESION: ICD-10-CM

## 2022-12-08 DIAGNOSIS — C82.13 FOLLICULAR LYMPHOMA GRADE II OF INTRA-ABDOMINAL LYMPH NODES (HCC): ICD-10-CM

## 2022-12-08 DIAGNOSIS — M47.816 LUMBAR SPONDYLOSIS: ICD-10-CM

## 2022-12-08 RX ORDER — CYCLOBENZAPRINE HCL 5 MG
TABLET ORAL
Qty: 90 TABLET | Refills: 0 | Status: SHIPPED | OUTPATIENT
Start: 2022-12-08

## 2022-12-08 NOTE — PATIENT INSTRUCTIONS
1) Start cyclobenzaprine 5 mg 1-2 tablets three times per day as needed for spasms. Do not operate heavy machinery while on this medication as it may make you sleepy. 2) Please begin physical therapy as soon as possible. 3) Tylenol 500-1000 mg every 6-8 hours as needed for pain. No more than 3000 mg daily. 4) Follow up with me to review the MRI images and discuss next steps. 5) Please call and schedule your MRI lumbar spine at [phone]. Once you have your MRI scheduled, then call my office again to schedule a follow-up visit soon after your MRI so we may review the images together.

## 2022-12-09 ENCOUNTER — APPOINTMENT (OUTPATIENT)
Dept: PHYSICAL THERAPY | Age: 47
End: 2022-12-09
Attending: INTERNAL MEDICINE
Payer: COMMERCIAL

## 2022-12-13 ENCOUNTER — TELEPHONE (OUTPATIENT)
Dept: FAMILY MEDICINE CLINIC | Facility: CLINIC | Age: 47
End: 2022-12-13

## 2022-12-13 DIAGNOSIS — M51.9 LUMBAR DISC DISEASE: Primary | ICD-10-CM

## 2022-12-13 RX ORDER — PREDNISONE 10 MG/1
10 TABLET ORAL DAILY
Qty: 30 TABLET | Refills: 0 | Status: SHIPPED | OUTPATIENT
Start: 2022-12-13

## 2022-12-13 NOTE — TELEPHONE ENCOUNTER
Patient called asking if [place] Stewart would order another prednisone pack. Patient was recently seen 12/8/22 by Nataly Glaser and MRI ordered. Patient states her back pain has not improved at all. Patient has tried advil and muscle relaxers without relief. Only relief she had was after taking prednisone pack. Patient has an MRI scheduled for 12/29/22.

## 2022-12-14 ENCOUNTER — OFFICE VISIT (OUTPATIENT)
Dept: PHYSICAL THERAPY | Age: 47
End: 2022-12-14
Attending: INTERNAL MEDICINE
Payer: COMMERCIAL

## 2022-12-14 PROCEDURE — 97140 MANUAL THERAPY 1/> REGIONS: CPT

## 2022-12-14 PROCEDURE — 97110 THERAPEUTIC EXERCISES: CPT

## 2022-12-16 ENCOUNTER — APPOINTMENT (OUTPATIENT)
Dept: PHYSICAL THERAPY | Age: 47
End: 2022-12-16
Attending: INTERNAL MEDICINE
Payer: COMMERCIAL

## 2022-12-21 ENCOUNTER — OFFICE VISIT (OUTPATIENT)
Dept: PHYSICAL THERAPY | Age: 47
End: 2022-12-21
Attending: INTERNAL MEDICINE
Payer: COMMERCIAL

## 2022-12-21 ENCOUNTER — TELEPHONE (OUTPATIENT)
Dept: NEUROLOGY | Facility: CLINIC | Age: 47
End: 2022-12-21

## 2022-12-21 PROCEDURE — 97110 THERAPEUTIC EXERCISES: CPT

## 2022-12-21 PROCEDURE — 97014 ELECTRIC STIMULATION THERAPY: CPT

## 2022-12-21 PROCEDURE — 97140 MANUAL THERAPY 1/> REGIONS: CPT

## 2022-12-22 NOTE — TELEPHONE ENCOUNTER
Physical Therapy CPT Codes: [id number] ,[id number]    Status: pending approval for referral # [id number]      Clinical notes sent to [facility] for review.

## 2022-12-23 ENCOUNTER — APPOINTMENT (OUTPATIENT)
Dept: PHYSICAL THERAPY | Age: 47
End: 2022-12-23
Attending: INTERNAL MEDICINE
Payer: COMMERCIAL

## 2022-12-27 ENCOUNTER — OFFICE VISIT (OUTPATIENT)
Dept: PHYSICAL THERAPY | Age: 47
End: 2022-12-27
Attending: INTERNAL MEDICINE
Payer: COMMERCIAL

## 2022-12-27 PROCEDURE — 97014 ELECTRIC STIMULATION THERAPY: CPT

## 2022-12-27 PROCEDURE — 97140 MANUAL THERAPY 1/> REGIONS: CPT

## 2022-12-27 PROCEDURE — 97110 THERAPEUTIC EXERCISES: CPT

## 2022-12-29 ENCOUNTER — HOSPITAL ENCOUNTER (OUTPATIENT)
Dept: MRI IMAGING | Age: 47
Discharge: HOME | End: 2022-12-29
Attending: PHYSICAL MEDICINE & REHABILITATION
Payer: COMMERCIAL

## 2022-12-29 DIAGNOSIS — M51.37 DDD (DEGENERATIVE DISC DISEASE), LUMBOSACRAL: ICD-10-CM

## 2022-12-29 DIAGNOSIS — M47.816 FACET SYNDROME, LUMBAR: ICD-10-CM

## 2022-12-29 DIAGNOSIS — M54.59 MECHANICAL LOW BACK PAIN: ICD-10-CM

## 2022-12-29 DIAGNOSIS — M99.9 BIOMECHANICAL LESION: ICD-10-CM

## 2022-12-29 DIAGNOSIS — C82.13 FOLLICULAR LYMPHOMA GRADE II OF INTRA-ABDOMINAL LYMPH NODES (HCC): ICD-10-CM

## 2022-12-29 DIAGNOSIS — M47.816 LUMBAR SPONDYLOSIS: ICD-10-CM

## 2022-12-29 LAB
CREAT BLD-MCNC: 0.8 MG/DL
GFR SERPLBLD BASED ON 1.73 SQ M-ARVRAT: 91 ML/MIN/1.73M2 (ref 60–?)

## 2022-12-29 PROCEDURE — 82565 ASSAY OF CREATININE: CPT

## 2022-12-29 PROCEDURE — 72158 MRI LUMBAR SPINE W/O & W/DYE: CPT | Performed by: PHYSICAL MEDICINE & REHABILITATION

## 2022-12-29 PROCEDURE — A9575 INJ GADOTERATE MEGLUMI 0.1ML: HCPCS | Performed by: PHYSICAL MEDICINE & REHABILITATION

## 2022-12-29 RX ORDER — GADOTERATE MEGLUMINE 376.9 MG/ML
20 INJECTION INTRAVENOUS
Status: COMPLETED | OUTPATIENT
Start: 2022-12-29 | End: 2022-12-29

## 2022-12-29 RX ADMIN — GADOTERATE MEGLUMINE 20 ML: 376.9 INJECTION INTRAVENOUS at 18:08:00

## 2023-01-04 ENCOUNTER — OFFICE VISIT (OUTPATIENT)
Dept: PHYSICAL MEDICINE AND REHAB | Facility: CLINIC | Age: 48
End: 2023-01-04
Payer: COMMERCIAL

## 2023-01-04 VITALS — WEIGHT: 217 LBS | OXYGEN SATURATION: 98 % | BODY MASS INDEX: 38.45 KG/M2 | HEART RATE: 93 BPM | HEIGHT: 63 IN

## 2023-01-04 DIAGNOSIS — C82.13 FOLLICULAR LYMPHOMA GRADE II OF INTRA-ABDOMINAL LYMPH NODES (HCC): ICD-10-CM

## 2023-01-04 DIAGNOSIS — M99.9 BIOMECHANICAL LESION: ICD-10-CM

## 2023-01-04 DIAGNOSIS — M54.59 MECHANICAL LOW BACK PAIN: ICD-10-CM

## 2023-01-04 DIAGNOSIS — M47.816 FACET SYNDROME, LUMBAR: ICD-10-CM

## 2023-01-04 DIAGNOSIS — M47.816 LUMBAR SPONDYLOSIS: ICD-10-CM

## 2023-01-04 DIAGNOSIS — M51.37 DDD (DEGENERATIVE DISC DISEASE), LUMBOSACRAL: Primary | ICD-10-CM

## 2023-01-04 PROCEDURE — 99214 OFFICE O/P EST MOD 30 MIN: CPT | Performed by: PHYSICAL MEDICINE & REHABILITATION

## 2023-01-04 PROCEDURE — 3008F BODY MASS INDEX DOCD: CPT | Performed by: PHYSICAL MEDICINE & REHABILITATION

## 2023-01-04 RX ORDER — IBUPROFEN 800 MG/1
800 TABLET ORAL 2 TIMES DAILY
Qty: 60 TABLET | Refills: 0 | Status: SHIPPED | OUTPATIENT
Start: 2023-01-04

## 2023-01-04 NOTE — PATIENT INSTRUCTIONS
1) Continue with the physical therapy  2) My office will call you to schedule the BILATERAL L4-L5 and L5-S1 facet joint injections under IVCS once the procedure is approved by your insurance carrier. 3) If the facet joint injections are not helpful, then would recommend caudal STEPHANIE  4) Continue Ibuprofen 800 mg twice per day  5) Tylenol 500-1000 mg every 6-8 hours as needed for pain. No more than 3000 mg daily. 6) Follow up with me 2 weeks after the procedure. This can be virtually.

## 2023-01-05 NOTE — TELEPHONE ENCOUNTER
Physical Therapy referral [id number]    Status: Approved     Authorization #: [id number]     Valid: 12/23/22-02/28/22    Sessions: 8     Patient currently in PT     -Referral # [id number] closed

## 2023-01-06 ENCOUNTER — TELEPHONE (OUTPATIENT)
Dept: CASE MANAGEMENT | Age: 48
End: 2023-01-06

## 2023-01-06 NOTE — TELEPHONE ENCOUNTER
Hello     I have received a referral request for the above patient. However, we cannot process this request. We need all fields completed. We need any cpt codes for service being requested. Please make notes in referral so that we have a clear understanding of what is needed.      Thank you,   [facility]  Referral specialist

## 2023-01-09 NOTE — TELEPHONE ENCOUNTER
Hello     How would you like me to proceed? The request I have received does not have details.        Thank you,  [facility]  Referral specialist.

## 2023-01-10 ENCOUNTER — PATIENT MESSAGE (OUTPATIENT)
Dept: PHYSICAL MEDICINE AND REHAB | Facility: CLINIC | Age: 48
End: 2023-01-10

## 2023-01-10 NOTE — TELEPHONE ENCOUNTER
From: [facility]  To: Amandeephallie Mooney MD  Sent: 1/10/2023 9:17 AM CST  Subject: Appt    Following up to see if an appointment has been figured out yet for my steroid injection?

## 2023-01-16 ENCOUNTER — TELEPHONE (OUTPATIENT)
Dept: PHYSICAL MEDICINE AND REHAB | Facility: CLINIC | Age: 48
End: 2023-01-16

## 2023-01-16 DIAGNOSIS — M47.816 LUMBAR SPONDYLOSIS: Primary | ICD-10-CM

## 2023-01-16 NOTE — TELEPHONE ENCOUNTER
Effective January 1, 2023, [place], is transitioning to [facility] as our new Third-Party  for all [employer] members ([employer], and [employer])     The central referral team at the hospital will now be handling all IHP referrals for the [facility] PCP and [facility].     The order did not drop in PATSY work queue and has not been worked by [employer] high priority for \"CART to follow up    Per Dr. Trenna Barthel order is incorrect and should be BILATERAL L4-L5 and L5-S1 facet joint injections under IVCS CPT [id number]  New order placed

## 2023-01-24 NOTE — TELEPHONE ENCOUNTER
Re- submitted to health plan to try and escalate the determination.      Patient health plan having problems reading faxed request.     Thank you,  [facility]  Referral specialist

## 2023-01-25 NOTE — TELEPHONE ENCOUNTER
Patient has been scheduled for BILATERAL L4-L5 and L5-S1 facet joint injections on 02[facility]08/[facility] with Dr.BEHAR. -Anesthesia type: IVCS. -If scheduling [facility] covid testing required for all procedures whether patient is vaccinated or not. -Patient informed not to eat or drink anything after midnight the night prior to the procedure, if being sedated. (Patient informed to fast 12 hours prior to procedure with IVCS/MAC. )  -Patient was advised that if he/she does receive the covid vaccine it needs to be at least 2 weeks before or after the injection. -Medications and allergies reviewed. -Patient reminded to hold NSAIDs (Ibuprofen, ASA 81, Aleve, Naproxen, Mobic, Diclofenac, Etodolac, Celebrex etc.) for 3 days prior to [facility]  if BMI is greater than 35. For Cervical injections only hold multivitamins, Vitamin E, Fish Oil, Phentermine (Lomaira) for 7 days prior to injection and NSAIDS.  mg to be held for 7 days prior to injections.  -If patient is receiving MAC/IVCS Phentermine [employer]) will need to be held for 7 days prior to injection.  -If on blood thinner clearance has been received to hold this medication by provider.   -Patient informed he/she will need a  to and from procedure. -[facility] is located in the [facility] 1st floor. Patient may park in the yellow or purple parking. Patient verbalized understanding and agrees with plan.  -----> Scheduled in Epic: Yes  -----> Scheduled in [employer]:  Yes

## 2023-02-02 ENCOUNTER — APPOINTMENT (OUTPATIENT)
Dept: HEMATOLOGY/ONCOLOGY | Facility: HOSPITAL | Age: 48
End: 2023-02-02
Attending: NURSE PRACTITIONER
Payer: COMMERCIAL

## 2023-02-06 ENCOUNTER — HOSPITAL ENCOUNTER (OUTPATIENT)
Dept: CT IMAGING | Facility: HOSPITAL | Age: 48
Discharge: HOME | End: 2023-02-06
Attending: INTERNAL MEDICINE
Payer: COMMERCIAL

## 2023-02-06 DIAGNOSIS — Z85.72 ENCOUNTER FOR FOLLOW-UP SURVEILLANCE OF LYMPHOMA: ICD-10-CM

## 2023-02-06 DIAGNOSIS — Z08 ENCOUNTER FOR FOLLOW-UP SURVEILLANCE OF LYMPHOMA: ICD-10-CM

## 2023-02-06 DIAGNOSIS — C82.13 FOLLICULAR LYMPHOMA GRADE II OF INTRA-ABDOMINAL LYMPH NODES (HCC): ICD-10-CM

## 2023-02-06 LAB
ALBUMIN SERPL-MCNC: 3.7 G/DL (ref 3.4–5)
ALBUMIN/GLOB SERPL: 1.2 {RATIO} (ref 1–2)
ALP LIVER SERPL-CCNC: 68 U/L
ALT SERPL-CCNC: 71 U/L
ANION GAP SERPL CALC-SCNC: 6 MMOL/L (ref 0–18)
AST SERPL-CCNC: 37 U/L (ref 15–37)
BASOPHILS # BLD AUTO: 0.02 X10(3) UL (ref 0–0.2)
BASOPHILS NFR BLD AUTO: 0.3 %
BILIRUB SERPL-MCNC: 1 MG/DL (ref 0.1–2)
BUN BLD-MCNC: 10 MG/DL (ref 7–18)
BUN/CREAT SERPL: 14.9 (ref 10–20)
CALCIUM BLD-MCNC: 8.3 MG/DL (ref 8.5–10.1)
CHLORIDE SERPL-SCNC: 107 MMOL/L (ref 98–112)
CO2 SERPL-SCNC: 25 MMOL/L (ref 21–32)
CREAT BLD-MCNC: 0.5 MG/DL
CREAT BLD-MCNC: 0.67 MG/DL
DEPRECATED RDW RBC AUTO: 38.3 FL (ref 35.1–46.3)
EOSINOPHIL # BLD AUTO: 0.09 X10(3) UL (ref 0–0.7)
EOSINOPHIL NFR BLD AUTO: 1.3 %
ERYTHROCYTE [DISTWIDTH] IN BLOOD BY AUTOMATED COUNT: 12.3 % (ref 11–15)
FASTING STATUS PATIENT QL REPORTED: NO
GFR SERPLBLD BASED ON 1.73 SQ M-ARVRAT: 108 ML/MIN/1.73M2 (ref 60–?)
GFR SERPLBLD BASED ON 1.73 SQ M-ARVRAT: 116 ML/MIN/1.73M2 (ref 60–?)
GLOBULIN PLAS-MCNC: 3 G/DL (ref 2.8–4.4)
GLUCOSE BLD-MCNC: 84 MG/DL (ref 70–99)
HCT VFR BLD AUTO: 38.9 %
HGB BLD-MCNC: 13.3 G/DL
IMM GRANULOCYTES # BLD AUTO: 0.02 X10(3) UL (ref 0–1)
IMM GRANULOCYTES NFR BLD: 0.3 %
LDH SERPL L TO P-CCNC: 200 U/L
LYMPHOCYTES # BLD AUTO: 1.02 X10(3) UL (ref 1–4)
LYMPHOCYTES NFR BLD AUTO: 14.6 %
MCH RBC QN AUTO: 29.2 PG (ref 26–34)
MCHC RBC AUTO-ENTMCNC: 34.2 G/DL (ref 31–37)
MCV RBC AUTO: 85.5 FL
MONOCYTES # BLD AUTO: 1.12 X10(3) UL (ref 0.1–1)
MONOCYTES NFR BLD AUTO: 16 %
NEUTROPHILS # BLD AUTO: 4.71 X10 (3) UL (ref 1.5–7.7)
NEUTROPHILS # BLD AUTO: 4.71 X10(3) UL (ref 1.5–7.7)
NEUTROPHILS NFR BLD AUTO: 67.5 %
OSMOLALITY SERPL CALC.SUM OF ELEC: 284 MOSM/KG (ref 275–295)
PLATELET # BLD AUTO: 228 10(3)UL (ref 150–450)
POTASSIUM SERPL-SCNC: 3.7 MMOL/L (ref 3.5–5.1)
PROT SERPL-MCNC: 6.7 G/DL (ref 6.4–8.2)
RBC # BLD AUTO: 4.55 X10(6)UL
SODIUM SERPL-SCNC: 138 MMOL/L (ref 136–145)
WBC # BLD AUTO: 7 X10(3) UL (ref 4–11)

## 2023-02-06 PROCEDURE — 74177 CT ABD & PELVIS W/CONTRAST: CPT | Performed by: INTERNAL MEDICINE

## 2023-02-06 PROCEDURE — 36591 DRAW BLOOD OFF VENOUS DEVICE: CPT

## 2023-02-06 PROCEDURE — 83615 LACTATE (LD) (LDH) ENZYME: CPT | Performed by: INTERNAL MEDICINE

## 2023-02-06 PROCEDURE — 80053 COMPREHEN METABOLIC PANEL: CPT | Performed by: INTERNAL MEDICINE

## 2023-02-06 PROCEDURE — 82565 ASSAY OF CREATININE: CPT

## 2023-02-06 PROCEDURE — 85025 COMPLETE CBC W/AUTO DIFF WBC: CPT | Performed by: INTERNAL MEDICINE

## 2023-02-06 PROCEDURE — 71260 CT THORAX DX C+: CPT | Performed by: INTERNAL MEDICINE

## 2023-02-06 RX ORDER — HEPARIN SODIUM (PORCINE) LOCK FLUSH IV SOLN 100 UNIT/ML 100 UNIT/ML
500 SOLUTION INTRAVENOUS ONCE
Status: COMPLETED | OUTPATIENT
Start: 2023-02-06 | End: 2023-02-06

## 2023-02-06 RX ORDER — HEPARIN SODIUM (PORCINE) LOCK FLUSH IV SOLN 100 UNIT/ML 100 UNIT/ML
SOLUTION INTRAVENOUS
Status: COMPLETED
Start: 2023-02-06 | End: 2023-02-06

## 2023-02-06 RX ADMIN — HEPARIN SODIUM (PORCINE) LOCK FLUSH IV SOLN 100 UNIT/ML 500 UNITS: 100 SOLUTION INTRAVENOUS at 15:00:00

## 2023-02-07 DIAGNOSIS — N83.202 LEFT OVARIAN CYST: Primary | ICD-10-CM

## 2023-02-08 ENCOUNTER — OFFICE VISIT (OUTPATIENT)
Dept: SURGERY | Facility: CLINIC | Age: 48
End: 2023-02-08
Payer: COMMERCIAL

## 2023-02-08 DIAGNOSIS — M79.10 MYALGIA: ICD-10-CM

## 2023-02-08 DIAGNOSIS — M47.816 LUMBAR SPONDYLOSIS: ICD-10-CM

## 2023-02-08 DIAGNOSIS — M47.816 FACET SYNDROME, LUMBAR: ICD-10-CM

## 2023-02-08 DIAGNOSIS — M99.9 BIOMECHANICAL LESION: ICD-10-CM

## 2023-02-08 DIAGNOSIS — M51.37 DDD (DEGENERATIVE DISC DISEASE), LUMBOSACRAL: ICD-10-CM

## 2023-02-08 DIAGNOSIS — M54.59 MECHANICAL LOW BACK PAIN: Primary | ICD-10-CM

## 2023-02-08 PROCEDURE — 64494 INJ PARAVERT F JNT L/S 2 LEV: CPT | Performed by: PHYSICAL MEDICINE & REHABILITATION

## 2023-02-08 PROCEDURE — 99152 MOD SED SAME PHYS/QHP 5/>YRS: CPT | Performed by: PHYSICAL MEDICINE & REHABILITATION

## 2023-02-08 PROCEDURE — 64493 INJ PARAVERT F JNT L/S 1 LEV: CPT | Performed by: PHYSICAL MEDICINE & REHABILITATION

## 2023-02-08 RX ORDER — TRAMADOL HYDROCHLORIDE 50 MG/1
50 TABLET ORAL EVERY 6 HOURS PRN
Qty: 60 TABLET | Refills: 0 | Status: SHIPPED | OUTPATIENT
Start: 2023-02-08 | End: 2023-02-10 | Stop reason: ALTCHOICE

## 2023-02-08 NOTE — PROCEDURES
Cuong Rodgers [address].    LUMBAR Z-JOINT/FACET INJECTIONS  NAME:  Lester Kerr    MR #:    [id number] :  1975     PHYSICIAN:  Urbano Priest MD        Operative Report    DATE OF PROCEDURE: 2023   PREOPERATIVE DIAGNOSES: 1. Mechanical low back pain    2. Facet syndrome, lumbar    3. Myalgia    4. Biomechanical lesion    5. Lumbar spondylosis    6. DDD (degenerative disc disease), lumbosacral        POSTOPERATIVE DIAGNOSES:   1. Mechanical low back pain    2. Facet syndrome, lumbar    3. Myalgia    4. Biomechanical lesion    5. Lumbar spondylosis    6. DDD (degenerative disc disease), lumbosacral        PROCEDURES: Bilateral L4-5 and L5-S1 Z-joint/facet injection done under fluoroscopic guidance with contrast enhancement. SURGEON: Urbano Priest MD   ANESTHESIA: IV conscious sedation   INDICATIONS:      OPERATIVE PROCEDURE:  Written consent was obtained from the patient. The patient was brought into the operating room and placed in the prone position on the fluoroscopy table with a pillow underneath the abdomen. The patient's skin was cleaned and draped in a normal sterile fashion. Using AP fluoroscopy, all five lumbar vertebrae were identified. Then the Bilateral L4-5 and L5-S1 z-joints were identified on AP view. At this point in time, the patient's skin was anesthetized with 1 to 2 cc of 1% PF lidocaine without epinephrine over each joint site. Then, 3.5 inch, 22-gauge spinal needles were inserted and directed towards the Bilateral L4-5 and L5-S1 z-joints . When it felt to be in good position, Bilateral anterior oblique fluoroscopy was used to advance the needle into the correct z-joint. At this point in time, Omnipaque-240 contrast was used to obtain a good athrogram indicating correct needle placement. Then, aspiration was performed.   No blood, fluid, or air was aspirated and each joint was injected with a 1 cc solution of 1/2 cc of 40 mg/cc of Kenalog and 1/2 cc of 1% PF lidocaine without epinephrine. After this, the needles were removed. The patient's skin was cleaned. Band-Aids were applied. The patient was transferred to the cart and into [facility]. The patient was given discharge instructions and will follow up in 2 weeks at our clinic. Throughout the whole procedure, the patient's pulse oximetry and vital signs were monitored and they remained completely stable. Also, throughout the whole procedure, prior to injection of any medication, aspiration was performed. No blood, fluid, or air was aspirated at anytime. Sina Ulloa.  Elaine Pinto MD, [address] [facility]  Physical Medicine and Rehabilitation/Sports Medicine  MEDICAL [facility]

## 2023-02-08 NOTE — PATIENT INSTRUCTIONS
Post Injection Instructions     Please do not do anything strenuous over the next two days (if you had a knee injection do not walk more than 2 city blocks, do not attend any aerobic classes, do not run, no heavy lifting, no prolong standing). You may resume your day to day activities after your injection. You may experience some mild amount of swelling after the procedure. Please ice your joint that was injected at least 5-6 times a day (15 minutes) for two days after (this will help prevent worsening pain that sometimes occurs after an injection). Only take tylenol if needed for pain for the first few days. Watch for signs of infection which include redness, warmth, worsening pain, fevers or chills. If you develop any of these signs call the office immediately at [phone]    Everyone responds differently to injections, but you can expect your peak effects a few weeks after your last injection. Micky Dailey.  Mary Parham MD  Physical Medicine and Rehabilitation/Sports Medicine  MEDICAL CENTER [facility]

## 2023-02-10 ENCOUNTER — PATIENT MESSAGE (OUTPATIENT)
Dept: INTERNAL MEDICINE CLINIC | Facility: CLINIC | Age: 48
End: 2023-02-10

## 2023-02-10 ENCOUNTER — OFFICE VISIT (OUTPATIENT)
Dept: HEMATOLOGY/ONCOLOGY | Facility: HOSPITAL | Age: 48
End: 2023-02-10
Attending: INTERNAL MEDICINE
Payer: COMMERCIAL

## 2023-02-10 VITALS
TEMPERATURE: 98 F | DIASTOLIC BLOOD PRESSURE: 72 MMHG | OXYGEN SATURATION: 96 % | HEART RATE: 74 BPM | WEIGHT: 219.38 LBS | SYSTOLIC BLOOD PRESSURE: 147 MMHG | HEIGHT: 63.39 IN | RESPIRATION RATE: 16 BRPM | BODY MASS INDEX: 38.39 KG/M2

## 2023-02-10 DIAGNOSIS — E66.9 OBESITY, UNSPECIFIED CLASSIFICATION, UNSPECIFIED OBESITY TYPE, UNSPECIFIED WHETHER SERIOUS COMORBIDITY PRESENT: Primary | ICD-10-CM

## 2023-02-10 DIAGNOSIS — Z08 ENCOUNTER FOR FOLLOW-UP SURVEILLANCE OF LYMPHOMA: ICD-10-CM

## 2023-02-10 DIAGNOSIS — Z85.72 ENCOUNTER FOR FOLLOW-UP SURVEILLANCE OF LYMPHOMA: ICD-10-CM

## 2023-02-10 DIAGNOSIS — C82.13 FOLLICULAR LYMPHOMA GRADE II OF INTRA-ABDOMINAL LYMPH NODES (HCC): Primary | ICD-10-CM

## 2023-02-10 PROCEDURE — 99213 OFFICE O/P EST LOW 20 MIN: CPT | Performed by: INTERNAL MEDICINE

## 2023-02-10 NOTE — TELEPHONE ENCOUNTER
Kwame Feliciano RN 2/10/2023 12:23 PM CST        ----- Message -----  From: Milan Angela  Sent: 2/10/2023 12:05 PM CST  To: Em Rn Triage  Subject: Referral     Just met with my Oncologist and she is recommending I go to the weight loss clinic. Could I get a referral for that?

## 2023-02-11 NOTE — TELEPHONE ENCOUNTER
From: Ilir Carmichael  Sent: 2/10/2023 6:10 PM CST  To: Em Rn Triage  Subject: Referral    Thank you. ..would you happen to have the contact info?

## 2023-02-27 ENCOUNTER — LAB ENCOUNTER (OUTPATIENT)
Dept: LAB | Facility: HOSPITAL | Age: 48
End: 2023-02-27
Attending: INTERNAL MEDICINE
Payer: COMMERCIAL

## 2023-02-27 ENCOUNTER — OFFICE VISIT (OUTPATIENT)
Dept: SURGERY | Facility: CLINIC | Age: 48
End: 2023-02-27
Payer: COMMERCIAL

## 2023-02-27 VITALS
SYSTOLIC BLOOD PRESSURE: 115 MMHG | HEART RATE: 80 BPM | DIASTOLIC BLOOD PRESSURE: 72 MMHG | WEIGHT: 215.88 LBS | OXYGEN SATURATION: 97 % | HEIGHT: 63.5 IN | BODY MASS INDEX: 37.78 KG/M2

## 2023-02-27 DIAGNOSIS — E88.81 INSULIN RESISTANCE: ICD-10-CM

## 2023-02-27 DIAGNOSIS — E66.9 OBESITY (BMI 30-39.9): ICD-10-CM

## 2023-02-27 DIAGNOSIS — R73.09 ABNORMAL BLOOD SUGAR: ICD-10-CM

## 2023-02-27 DIAGNOSIS — E78.1 HYPERTRIGLYCERIDEMIA: ICD-10-CM

## 2023-02-27 DIAGNOSIS — E88.81 INSULIN RESISTANCE: Primary | ICD-10-CM

## 2023-02-27 PROBLEM — E88.819 INSULIN RESISTANCE: Status: ACTIVE | Noted: 2023-02-27

## 2023-02-27 LAB
ATRIAL RATE: 79 BPM
EST. AVERAGE GLUCOSE BLD GHB EST-MCNC: 126 MG/DL (ref 68–126)
HBA1C MFR BLD: 6 % (ref ?–5.7)
P AXIS: 33 DEGREES
P-R INTERVAL: 158 MS
Q-T INTERVAL: 382 MS
QRS DURATION: 88 MS
QTC CALCULATION (BEZET): 438 MS
R AXIS: 10 DEGREES
T AXIS: 10 DEGREES
VENTRICULAR RATE: 79 BPM

## 2023-02-27 PROCEDURE — 36415 COLL VENOUS BLD VENIPUNCTURE: CPT

## 2023-02-27 PROCEDURE — 93005 ELECTROCARDIOGRAM TRACING: CPT

## 2023-02-27 PROCEDURE — 83036 HEMOGLOBIN GLYCOSYLATED A1C: CPT

## 2023-02-27 PROCEDURE — 99204 OFFICE O/P NEW MOD 45 MIN: CPT | Performed by: INTERNAL MEDICINE

## 2023-02-27 PROCEDURE — 93010 ELECTROCARDIOGRAM REPORT: CPT | Performed by: INTERNAL MEDICINE

## 2023-02-27 PROCEDURE — 3008F BODY MASS INDEX DOCD: CPT | Performed by: INTERNAL MEDICINE

## 2023-02-27 PROCEDURE — 3078F DIAST BP <80 MM HG: CPT | Performed by: INTERNAL MEDICINE

## 2023-02-27 PROCEDURE — 3074F SYST BP LT 130 MM HG: CPT | Performed by: INTERNAL MEDICINE

## 2023-02-27 RX ORDER — PHENTERMINE HYDROCHLORIDE 15 MG/1
15 CAPSULE ORAL EVERY MORNING
Qty: 30 CAPSULE | Refills: 2 | Status: SHIPPED | OUTPATIENT
Start: 2023-02-27

## 2023-02-28 ENCOUNTER — OFFICE VISIT (OUTPATIENT)
Dept: PHYSICAL MEDICINE AND REHAB | Facility: CLINIC | Age: 48
End: 2023-02-28
Payer: COMMERCIAL

## 2023-02-28 DIAGNOSIS — M51.37 DDD (DEGENERATIVE DISC DISEASE), LUMBOSACRAL: ICD-10-CM

## 2023-02-28 DIAGNOSIS — M99.9 BIOMECHANICAL LESION: ICD-10-CM

## 2023-02-28 DIAGNOSIS — M47.816 FACET SYNDROME, LUMBAR: ICD-10-CM

## 2023-02-28 DIAGNOSIS — M54.59 MECHANICAL LOW BACK PAIN: Primary | ICD-10-CM

## 2023-02-28 DIAGNOSIS — C82.13 FOLLICULAR LYMPHOMA GRADE II OF INTRA-ABDOMINAL LYMPH NODES (HCC): ICD-10-CM

## 2023-02-28 DIAGNOSIS — M47.816 LUMBAR SPONDYLOSIS: ICD-10-CM

## 2023-02-28 PROBLEM — M51.379 DDD (DEGENERATIVE DISC DISEASE), LUMBOSACRAL: Status: ACTIVE | Noted: 2023-02-28

## 2023-02-28 PROCEDURE — 99214 OFFICE O/P EST MOD 30 MIN: CPT | Performed by: PHYSICAL MEDICINE & REHABILITATION

## 2023-02-28 NOTE — PATIENT INSTRUCTIONS
1) restart Physical therapy again to help gain strength in the core and pelvis   2) Tylenol 500-1000 mg every 6-8 hours as needed for pain. No more than 3000 mg daily. 3) Lets touch base in about 6-8 weeks .  If symptoms reoccur, we can repeat injections into the facet joints or we can consider radiofrequency ablation (burning nerves that go to joints)

## 2023-03-12 ENCOUNTER — PATIENT MESSAGE (OUTPATIENT)
Dept: OBGYN CLINIC | Facility: CLINIC | Age: 48
End: 2023-03-12

## 2023-03-13 NOTE — TELEPHONE ENCOUNTER
From: [facility]  To: Derick Kennedy. [id number] [facility]  Sent: 3/12/2023 12:59 PM CDT  Subject: Period concern    Hello, after not having my period since June 2022 I started lightly bleeding February 22nd to a full blown period. I'm currently still bleeding pretty heavily. Have woken up a few mornings to a clear tampon to then fully bleeding again by afternoon. Is this normal?  I have the abdominal u/s on March 20th for cysts found on MRI.

## 2023-03-14 NOTE — TELEPHONE ENCOUNTER
Pt's last period was in 6/2022. Pt states she started bleeding on 2/22/23 and is still bleeding. Pt states she is changing a tampon every few hours and passing some clots. Pt is having painful cramps but takes 600mg of Advil. Pt states breasts are very tender as well. Pt had pelvic US scheduled on 3/20. Message to JALEN for recs on prolonged bleeding.

## 2023-03-16 NOTE — TELEPHONE ENCOUNTER
Message to JALEN. Please see thread below and also 2/7/23 TE. Pt started period flow on 2/22/23 and still bleeding. Changing tampons every few hours.

## 2023-03-17 ENCOUNTER — TELEPHONE (OUTPATIENT)
Dept: OBGYN CLINIC | Facility: CLINIC | Age: 48
End: 2023-03-17

## 2023-03-17 RX ORDER — MEDROXYPROGESTERONE ACETATE 10 MG/1
20 TABLET ORAL 2 TIMES DAILY
Qty: 30 TABLET | Refills: 1 | Status: SHIPPED | OUTPATIENT
Start: 2023-03-17

## 2023-03-17 NOTE — TELEPHONE ENCOUNTER
Pt requested call back from Nurse regarding previous MyChart message sent about heavy bleeding concerns. Please call.

## 2023-03-17 NOTE — TELEPHONE ENCOUNTER
I called and spoke with [facility] ADITYA. We'll initiate Provera 20 mg BID through Monday and then 20 mg once daily until finished. Expect self-limited period to follow withdrawal. We'll discuss US scheduled Monday. Rx sent.

## 2023-03-20 ENCOUNTER — HOSPITAL ENCOUNTER (OUTPATIENT)
Dept: ULTRASOUND IMAGING | Facility: HOSPITAL | Age: 48
Discharge: HOME | End: 2023-03-20
Attending: OBSTETRICS & GYNECOLOGY
Payer: COMMERCIAL

## 2023-03-20 DIAGNOSIS — N83.202 LEFT OVARIAN CYST: ICD-10-CM

## 2023-03-20 PROCEDURE — 76830 TRANSVAGINAL US NON-OB: CPT | Performed by: OBSTETRICS & GYNECOLOGY

## 2023-03-20 PROCEDURE — 76856 US EXAM PELVIC COMPLETE: CPT | Performed by: OBSTETRICS & GYNECOLOGY

## 2023-03-21 ENCOUNTER — TELEPHONE (OUTPATIENT)
Dept: OBGYN CLINIC | Facility: CLINIC | Age: 48
End: 2023-03-21

## 2023-03-21 DIAGNOSIS — N83.292 COMPLEX CYST OF LEFT OVARY: Primary | ICD-10-CM

## 2023-03-22 NOTE — TELEPHONE ENCOUNTER
Lm stating f/u call to pt and see if she has questions. Pt read pelvis us result note sent by JALEN on 3/21/23.

## 2023-04-05 RX ORDER — ROSUVASTATIN CALCIUM 20 MG/1
20 TABLET, COATED ORAL NIGHTLY
Qty: 90 TABLET | Refills: 3 | Status: SHIPPED | OUTPATIENT
Start: 2023-04-05

## 2023-04-05 NOTE — TELEPHONE ENCOUNTER
Refill passed per Frazr, [facility] protocol. Requested Prescriptions   Pending Prescriptions Disp Refills    ROSUVASTATIN 20 MG Oral Tab [Pharmacy Med Name: ROSUVASTATIN CALCIUM 20 MG TAB] 90 tablet 1     Sig: TAKE 1 TABLET BY MOUTH EVERY DAY AT NIGHT       Cholesterol Medication Protocol Passed - 4/5/2023 12:09 AM        Passed - ALT in past 12 months        Passed - LDL in past 12 months        Passed - Last ALT < 80     Lab Results   Component Value Date    ALT 71 (H) 02/06/2023             Passed - Last LDL < 130     Lab Results   Component Value Date    LDL 89 09/13/2022             Passed - In person appointment or virtual visit in the past 12 mos or appointment in next 3 mos     Recent Outpatient Visits              1 month ago Mechanical low back pain    [address] [place], [place] [address]Edwin MD    Office Visit    1 month ago Insulin resistance    [address] [place], [place] [address]Aurora MD    Office Visit    1 month ago Follicular lymphoma grade II of intra-abdominal lymph nodes [facility])    [facility] AND [facility] Hematology Oncology Racheal Rivera MD    Office Visit    1 month ago Mechanical low back pain    [facility] NE[facility]O Mahesh Iyer MD    Office Visit    3 months ago DDD (degenerative disc disease), lumbosacral    [address] [place], [place] [address]Edwin MD    Office Visit          Future Appointments         Provider Department Appt Notes    In 1 month EM [address] [facility] - Infusion FT LAB. PORT. CL    In 1 month [address] Hematology Oncology FOLLOW UP VISIT. CL  3M    In 1 month Fortunato Lozano MD [address] [place] [address]- 2 OF 6 VISITS, Start: Feb 10, 2023 Expiration: Feb 10, 2024  NON SX F/U    In 2 months [address] as my last ultrasound was very painful.  Getting up was brutal as I have a bulging disk. Recent Outpatient Visits              1 month ago Mechanical low back pain    Hyun Plasencia MD    Office Visit    1 month ago Insulin resistance    Tito Hartmann MD    Office Visit    1 month ago Follicular lymphoma grade II of intra-abdominal lymph nodes [facility])    [facility] AND [facility] Hematology Oncology Angelina Goodrich MD    Office Visit    1 month ago Mechanical low back pain    [facility] Dalila Bumpers, MD    Office Visit    3 months ago DDD (degenerative disc disease), lumbosacral    Hyun Plasencia MD    Office Visit            Future Appointments         Provider Department Appt Notes    In 1 month EM [facility] FT LAB. PORT. CL    In 1 month [address] Hematology Oncology FOLLOW UP VISIT. CL  3M    In 1 month MD Connie Kaye, [facility]- 2 OF 6 VISITS, Start: Feb 10, 2023 Expiration: Feb 10, 2024  NON SX F/U    In 2 months [address] 78 as my last ultrasound was very painful. Getting up was brutal as I have a bulging disk.

## 2023-05-10 ENCOUNTER — NURSE ONLY (OUTPATIENT)
Dept: HEMATOLOGY/ONCOLOGY | Facility: HOSPITAL | Age: 48
End: 2023-05-10
Attending: INTERNAL MEDICINE
Payer: COMMERCIAL

## 2023-05-10 VITALS
SYSTOLIC BLOOD PRESSURE: 140 MMHG | DIASTOLIC BLOOD PRESSURE: 93 MMHG | HEART RATE: 85 BPM | HEIGHT: 63.39 IN | RESPIRATION RATE: 18 BRPM | BODY MASS INDEX: 36.4 KG/M2 | OXYGEN SATURATION: 98 % | WEIGHT: 208 LBS | TEMPERATURE: 98 F

## 2023-05-10 DIAGNOSIS — Z85.72 ENCOUNTER FOR FOLLOW-UP SURVEILLANCE OF LYMPHOMA: ICD-10-CM

## 2023-05-10 DIAGNOSIS — C82.13 FOLLICULAR LYMPHOMA GRADE II OF INTRA-ABDOMINAL LYMPH NODES (HCC): Primary | ICD-10-CM

## 2023-05-10 DIAGNOSIS — Z08 ENCOUNTER FOR FOLLOW-UP SURVEILLANCE OF LYMPHOMA: ICD-10-CM

## 2023-05-10 DIAGNOSIS — C82.13 FOLLICULAR LYMPHOMA GRADE II OF INTRA-ABDOMINAL LYMPH NODES (HCC): ICD-10-CM

## 2023-05-10 LAB
ALBUMIN SERPL-MCNC: 3.6 G/DL (ref 3.4–5)
ALBUMIN/GLOB SERPL: 1 {RATIO} (ref 1–2)
ALP LIVER SERPL-CCNC: 65 U/L
ALT SERPL-CCNC: 45 U/L
ANION GAP SERPL CALC-SCNC: 6 MMOL/L (ref 0–18)
AST SERPL-CCNC: 28 U/L (ref 15–37)
BASOPHILS # BLD AUTO: 0.02 X10(3) UL (ref 0–0.2)
BASOPHILS NFR BLD AUTO: 0.4 %
BILIRUB SERPL-MCNC: 0.7 MG/DL (ref 0.1–2)
BUN BLD-MCNC: 10 MG/DL (ref 7–18)
BUN/CREAT SERPL: 15.2 (ref 10–20)
CALCIUM BLD-MCNC: 8.6 MG/DL (ref 8.5–10.1)
CHLORIDE SERPL-SCNC: 108 MMOL/L (ref 98–112)
CO2 SERPL-SCNC: 26 MMOL/L (ref 21–32)
CREAT BLD-MCNC: 0.66 MG/DL
DEPRECATED RDW RBC AUTO: 37.3 FL (ref 35.1–46.3)
EOSINOPHIL # BLD AUTO: 0.1 X10(3) UL (ref 0–0.7)
EOSINOPHIL NFR BLD AUTO: 2.2 %
ERYTHROCYTE [DISTWIDTH] IN BLOOD BY AUTOMATED COUNT: 11.9 % (ref 11–15)
GFR SERPLBLD BASED ON 1.73 SQ M-ARVRAT: 108 ML/MIN/1.73M2 (ref 60–?)
GLOBULIN PLAS-MCNC: 3.6 G/DL (ref 2.8–4.4)
GLUCOSE BLD-MCNC: 93 MG/DL (ref 70–99)
HCT VFR BLD AUTO: 42.4 %
HGB BLD-MCNC: 14.1 G/DL
IMM GRANULOCYTES # BLD AUTO: 0.02 X10(3) UL (ref 0–1)
IMM GRANULOCYTES NFR BLD: 0.4 %
LDH SERPL L TO P-CCNC: 202 U/L
LYMPHOCYTES # BLD AUTO: 0.93 X10(3) UL (ref 1–4)
LYMPHOCYTES NFR BLD AUTO: 20.8 %
MCH RBC QN AUTO: 28.5 PG (ref 26–34)
MCHC RBC AUTO-ENTMCNC: 33.3 G/DL (ref 31–37)
MCV RBC AUTO: 85.7 FL
MONOCYTES # BLD AUTO: 0.52 X10(3) UL (ref 0.1–1)
MONOCYTES NFR BLD AUTO: 11.6 %
NEUTROPHILS # BLD AUTO: 2.88 X10 (3) UL (ref 1.5–7.7)
NEUTROPHILS # BLD AUTO: 2.88 X10(3) UL (ref 1.5–7.7)
NEUTROPHILS NFR BLD AUTO: 64.6 %
OSMOLALITY SERPL CALC.SUM OF ELEC: 289 MOSM/KG (ref 275–295)
PLATELET # BLD AUTO: 332 10(3)UL (ref 150–450)
POTASSIUM SERPL-SCNC: 3.8 MMOL/L (ref 3.5–5.1)
PROT SERPL-MCNC: 7.2 G/DL (ref 6.4–8.2)
RBC # BLD AUTO: 4.95 X10(6)UL
SODIUM SERPL-SCNC: 140 MMOL/L (ref 136–145)
WBC # BLD AUTO: 4.5 X10(3) UL (ref 4–11)

## 2023-05-10 PROCEDURE — 80053 COMPREHEN METABOLIC PANEL: CPT

## 2023-05-10 PROCEDURE — 83615 LACTATE (LD) (LDH) ENZYME: CPT

## 2023-05-10 PROCEDURE — 99213 OFFICE O/P EST LOW 20 MIN: CPT | Performed by: INTERNAL MEDICINE

## 2023-05-10 PROCEDURE — 36591 DRAW BLOOD OFF VENOUS DEVICE: CPT

## 2023-05-10 PROCEDURE — 85025 COMPLETE CBC W/AUTO DIFF WBC: CPT

## 2023-06-21 ENCOUNTER — HOSPITAL ENCOUNTER (OUTPATIENT)
Dept: ULTRASOUND IMAGING | Facility: HOSPITAL | Age: 48
Discharge: HOME | End: 2023-06-21
Attending: OBSTETRICS & GYNECOLOGY
Payer: COMMERCIAL

## 2023-06-21 DIAGNOSIS — N83.292 COMPLEX CYST OF LEFT OVARY: ICD-10-CM

## 2023-06-21 PROCEDURE — 76830 TRANSVAGINAL US NON-OB: CPT | Performed by: OBSTETRICS & GYNECOLOGY

## 2023-06-21 PROCEDURE — 76856 US EXAM PELVIC COMPLETE: CPT | Performed by: OBSTETRICS & GYNECOLOGY

## 2023-06-29 ENCOUNTER — PATIENT MESSAGE (OUTPATIENT)
Dept: OBGYN CLINIC | Facility: CLINIC | Age: 48
End: 2023-06-29

## 2023-07-06 ENCOUNTER — OFFICE VISIT (OUTPATIENT)
Dept: INTERNAL MEDICINE CLINIC | Facility: CLINIC | Age: 48
End: 2023-07-06

## 2023-07-06 VITALS
SYSTOLIC BLOOD PRESSURE: 126 MMHG | WEIGHT: 211.19 LBS | HEIGHT: 63.39 IN | DIASTOLIC BLOOD PRESSURE: 74 MMHG | RESPIRATION RATE: 18 BRPM | HEART RATE: 80 BPM | BODY MASS INDEX: 36.95 KG/M2 | OXYGEN SATURATION: 97 %

## 2023-07-06 DIAGNOSIS — Z12.31 ENCOUNTER FOR SCREENING MAMMOGRAM FOR MALIGNANT NEOPLASM OF BREAST: ICD-10-CM

## 2023-07-06 DIAGNOSIS — M77.11 LATERAL EPICONDYLITIS OF RIGHT ELBOW: Primary | ICD-10-CM

## 2023-07-06 PROCEDURE — 99214 OFFICE O/P EST MOD 30 MIN: CPT | Performed by: INTERNAL MEDICINE

## 2023-07-06 PROCEDURE — 3008F BODY MASS INDEX DOCD: CPT | Performed by: INTERNAL MEDICINE

## 2023-07-06 PROCEDURE — 3074F SYST BP LT 130 MM HG: CPT | Performed by: INTERNAL MEDICINE

## 2023-07-06 PROCEDURE — 3078F DIAST BP <80 MM HG: CPT | Performed by: INTERNAL MEDICINE

## 2023-07-06 RX ORDER — MELOXICAM 7.5 MG/1
7.5 TABLET ORAL DAILY
Qty: 30 TABLET | Refills: 0 | Status: SHIPPED | OUTPATIENT
Start: 2023-07-06

## 2023-07-08 ENCOUNTER — OFFICE VISIT (OUTPATIENT)
Dept: FAMILY MEDICINE CLINIC | Facility: CLINIC | Age: 48
End: 2023-07-08
Payer: COMMERCIAL

## 2023-07-08 VITALS
BODY MASS INDEX: 36.19 KG/M2 | HEIGHT: 64 IN | TEMPERATURE: 97 F | OXYGEN SATURATION: 98 % | HEART RATE: 96 BPM | SYSTOLIC BLOOD PRESSURE: 126 MMHG | WEIGHT: 212 LBS | RESPIRATION RATE: 20 BRPM | DIASTOLIC BLOOD PRESSURE: 86 MMHG

## 2023-07-08 DIAGNOSIS — J02.9 ACUTE PHARYNGITIS, UNSPECIFIED ETIOLOGY: Primary | ICD-10-CM

## 2023-07-08 LAB
CONTROL LINE PRESENT WITH A CLEAR BACKGROUND (YES/NO): YES YES/NO
KIT LOT #: NORMAL NUMERIC
STREP GRP A CUL-SCR: NEGATIVE

## 2023-07-08 PROCEDURE — 87880 STREP A ASSAY W/OPTIC: CPT | Performed by: NURSE PRACTITIONER

## 2023-07-08 PROCEDURE — 99213 OFFICE O/P EST LOW 20 MIN: CPT | Performed by: NURSE PRACTITIONER

## 2023-07-08 PROCEDURE — 3008F BODY MASS INDEX DOCD: CPT | Performed by: NURSE PRACTITIONER

## 2023-07-08 PROCEDURE — 3074F SYST BP LT 130 MM HG: CPT | Performed by: NURSE PRACTITIONER

## 2023-07-08 PROCEDURE — 3079F DIAST BP 80-89 MM HG: CPT | Performed by: NURSE PRACTITIONER

## 2023-08-02 ENCOUNTER — PATIENT MESSAGE (OUTPATIENT)
Dept: INTERNAL MEDICINE CLINIC | Facility: CLINIC | Age: 48
End: 2023-08-02

## 2023-08-02 ENCOUNTER — NURSE TRIAGE (OUTPATIENT)
Dept: INTERNAL MEDICINE CLINIC | Facility: CLINIC | Age: 48
End: 2023-08-02

## 2023-08-02 ENCOUNTER — TELEMEDICINE (OUTPATIENT)
Dept: INTERNAL MEDICINE CLINIC | Facility: CLINIC | Age: 48
End: 2023-08-02

## 2023-08-02 DIAGNOSIS — R14.0 BLOATING: ICD-10-CM

## 2023-08-02 DIAGNOSIS — Z00.00 ANNUAL PHYSICAL EXAM: Primary | ICD-10-CM

## 2023-08-02 DIAGNOSIS — R14.3 FLATULENCE: ICD-10-CM

## 2023-08-02 DIAGNOSIS — E78.00 PURE HYPERCHOLESTEROLEMIA: ICD-10-CM

## 2023-08-02 DIAGNOSIS — E88.81 INSULIN RESISTANCE: ICD-10-CM

## 2023-08-02 DIAGNOSIS — R14.2 BELCHING: Primary | ICD-10-CM

## 2023-08-02 PROCEDURE — 99213 OFFICE O/P EST LOW 20 MIN: CPT | Performed by: INTERNAL MEDICINE

## 2023-08-02 NOTE — TELEPHONE ENCOUNTER
Previously completed labs 5/10/23: CBC, CMP, 2/27/23: A1c, 2/39/82: Vitamin D, Folic acid, vitamin L04, TSH, LIPID. Patient has the following appointment for physical 9/15/23. Future Appointments   Date Time Provider Paula Blank   8/2/2023  2:45 PM Edwige Arias MD [facility]   8/10/2023  9:00 AM [address] [place] [address] CT RM3 [address] [place] [address] CT EM [place]   8/21/2023 11:00 AM EM CC LAB2 [facility] CHEMO EMO   8/21/2023 12:00 PM Usman Massey MD [address] [place] [address] HEM ONC EMO   9/15/2023  9:15 AM MD Shauna Moreno Dr. [place]: Patient would like to know if you would like labs completed prior to visit?

## 2023-08-02 NOTE — TELEPHONE ENCOUNTER
Action Requested: Summary for Provider     []  Critical Lab, Recommendations Needed  [] Need Additional Advice  []   FYI    []   Need Orders  [] Need Medications Sent to Pharmacy  []  Other     SUMMARY: video appt made, c/c burping, very gassy x few days tums not helping, ate tacos last night, woke up burping, no chest pain but describe feeling a knot at base of esophagus, at dentist office  at the present     Reason for call: Heartburn  Onset: few days                    Reason for Disposition   MILD TO MODERATE pain that comes and goes (cramps) lasts > 24 hours    Protocols used: Abdominal Pain - Upper-A-[place]

## 2023-08-02 NOTE — PROGRESS NOTES
Patient ID: Delfina Moreno is a 51year old female. Patient presents with:  Belching: Throat pain         HISTORY OF PRESENT ILLNESS:   Patient presents for above. This visit is conducted using Telemedicine with live, interactive video and audio. Patient with a 2-day history of excessive belching, watery stools, flatulence, and sore throat. No fevers or chills. No one else with similar symptoms. No recent travel or antibiotic use. Has tried simethicone without improvement. Review of Systems   Constitutional: Negative. HENT: Negative. Eyes: Negative. Respiratory: Negative. Cardiovascular: Negative. Gastrointestinal:  Positive for abdominal pain and diarrhea. Endocrine: Negative. Genitourinary: Negative. Musculoskeletal: Negative. Allergic/Immunologic: Negative. Neurological: Negative. Hematological: Negative. MEDICAL HISTORY:     Past Medical History:   Diagnosis Date    Allergic rhinitis 5 years ago    Anxiety 3 years ago    occasional    Artificial insemination     Back problem     DDD    Cancer ([facility] [address]     non hodgkin's lymphoma    Cholecystitis     Laser cholecystectomy     Decorative tattoo     Degenerative lumbar disc     Degenerative L2 disc, Therapy    H/O pregnancy , , ,     EAB x 2-, ;  X 3- 6161, ?, 3122    Helicobacter positive gastritis 2021    Noted on EGD; no ulcer. On abx    High cholesterol     Hormone disorder     Hyperlipidemia     Infertility     Art insemination    Lipid screening 03/10/2014    per NG    Obesity (BMI 30-39. 9)     Obesity, unspecified     Polycystic ovaries     Artificial insemination/clomid    Screen for colon cancer 2019    no adenomatous polyp; repeat CLN in 10 yrs    Tonsillitis     Tonsillectomy       Past Surgical History:   Procedure Laterality Date           X 3, , ?, 2009       &     CHOLECYSTECTOMY      laser cholecystectomy    COLONOSCOPY  2019    SKIN SURGERY  2014    #2 moles removed on back    TONSILLECTOMY           Current Outpatient Medications:     Meloxicam 7.5 MG Oral Tab, Take 1 tablet (7.5 mg total) by mouth daily. , Disp: 30 tablet, Rfl: 0    Allergies:  Latex                   RASH, ITCHING    Comment:redness    Social History    Socioeconomic History      Marital status:       Spouse name: Not on file      Number of children: Not on file      Years of education: Not on file      Highest education level: Not on file    Occupational History      Not on file    Tobacco Use      Smoking status: Former        Packs/day: 0.00        Types: Cigarettes        Quit date: 1993        Years since quittin.3      Smokeless tobacco: Never    Vaping Use      Vaping Use: Never used    Substance and Sexual Activity      Alcohol use: Not Currently        Alcohol/week: 1.0 standard drink of alcohol        Types: 1 Standard drinks or equivalent per week        Comment: very occasional      Drug use: No      Sexual activity: Yes        Partners: Male        Birth control/protection: Vasectomy    Other Topics      Concerns:         Service: Not Asked        Blood Transfusions: Not Asked        Caffeine Concern: Yes          Coffee, 1 cup daily        Occupational Exposure: Not Asked        Hobby Hazards: Not Asked        Sleep Concern: Not Asked        Stress Concern: Not Asked        Weight Concern: Not Asked        Special Diet: Not Asked        Back Care: Not Asked        Exercise: Yes          walking daily        Bike Helmet: Not Asked        Seat Belt: Not Asked        Self-Exams: Not Asked        Grew up on a farm: Not Asked        History of tanning: Yes        Outdoor occupation: Not Asked        Pt has a pacemaker: Not Asked        Pt has a defibrillator: Not Asked        Breast feeding: No        Reaction to local anesthetic: No    Social History Narrative      Not on file    Social Determinants of Health  Financial Resource Strain: Not on file  Food Insecurity: Not on file  Transportation Needs: Not on file  Physical Activity: Not on file  Stress: Not on file  Social Connections: Not on file  Housing Stability: Not on file    PHYSICAL EXAM:   Unable to perform vitals or do physical exam as this is a virtual video visit. Patient appears alert. No conversational dyspnea or distress. ASSESSMENT/PLAN:   1. Belching  Suspect mild case of gastroenteritis. Drink plenty of fluids. [place] diet. If no improvement then contact the office again. 2. Flatulence  As per #1.    3. Bloating  As per #1. Return if symptoms worsen or fail to improve. Time spent on encounter  11 minutes   Video time 6 minutes   Documentation time 5 minutes     [address] [facility] video evaluation is not a substitute for face-to-face examination or emergency care. Patient advised to go to ER or call 911 for worsening symptoms or acute distress. Telehealth outside of [address] Verbal Consent   I conducted a telehealth visit with [address], 08/02/23, which was completed using two-way, real-time interactive audio and video communication. This has been done in good mesha to provide continuity of care in the best interest of the provider-patient relationship, due to the COVID -19 public health crisis/national emergency where restrictions of face-to-face office visits are ongoing. Every conscious effort was taken to allow for sufficient and adequate time to complete the visit. The patient was made aware of the limitations of the telehealth visit, including treatment limitations as no physical exam could be performed. The patient was advised to call 911 or to go to the ER in case there was an emergency. The patient was also advised of the potential privacy & security concerns related to the telehealth platform.    The patient was made aware of where to find [facility] notice of privacy practices, telehealth consent form and other related consent forms and documents. which are located on the [facility] website. The patient verbally agreed to telehealth consent form, related consents and the risks discussed. Lastly, the patient confirmed that they were in [place]. Included in this visit, time may have been spent reviewing labs, medications, radiology tests and decision making. Appropriate medical decision-making and tests are ordered as detailed in the plan of care above. Coding/billing information is submitted for this visit based on complexity of care and/or time spent for the visit. This note was prepared using Cont[employer]+ voice recognition dictation software. As a result errors may occur. When identified these errors have been corrected.  While every attempt is made to correct errors during dictation discrepancies may still exist.    Benji Johnson MD  8/2/2023

## 2023-08-10 ENCOUNTER — HOSPITAL ENCOUNTER (OUTPATIENT)
Dept: CT IMAGING | Facility: HOSPITAL | Age: 48
Discharge: HOME | End: 2023-08-10
Attending: INTERNAL MEDICINE
Payer: COMMERCIAL

## 2023-08-10 DIAGNOSIS — Z08 ENCOUNTER FOR FOLLOW-UP SURVEILLANCE OF LYMPHOMA: ICD-10-CM

## 2023-08-10 DIAGNOSIS — Z85.72 ENCOUNTER FOR FOLLOW-UP SURVEILLANCE OF LYMPHOMA: ICD-10-CM

## 2023-08-10 DIAGNOSIS — C82.13 FOLLICULAR LYMPHOMA GRADE II OF INTRA-ABDOMINAL LYMPH NODES (HCC): ICD-10-CM

## 2023-08-10 LAB
CREAT BLD-MCNC: 0.7 MG/DL
EGFRCR SERPLBLD CKD-EPI 2021: 106 ML/MIN/1.73M2 (ref 60–?)

## 2023-08-10 PROCEDURE — 74177 CT ABD & PELVIS W/CONTRAST: CPT | Performed by: INTERNAL MEDICINE

## 2023-08-10 PROCEDURE — 71260 CT THORAX DX C+: CPT | Performed by: INTERNAL MEDICINE

## 2023-08-10 PROCEDURE — 82565 ASSAY OF CREATININE: CPT

## 2023-08-18 DIAGNOSIS — Z08 ENCOUNTER FOR FOLLOW-UP SURVEILLANCE OF LYMPHOMA: Primary | ICD-10-CM

## 2023-08-18 DIAGNOSIS — Z85.72 ENCOUNTER FOR FOLLOW-UP SURVEILLANCE OF LYMPHOMA: Primary | ICD-10-CM

## 2023-08-18 RX ORDER — HEPARIN SODIUM (PORCINE) LOCK FLUSH IV SOLN 100 UNIT/ML 100 UNIT/ML
5 SOLUTION INTRAVENOUS ONCE
Status: CANCELLED | OUTPATIENT
Start: 2023-08-18

## 2023-08-18 RX ORDER — SODIUM CHLORIDE 9 MG/ML
10 INJECTION INTRAVENOUS ONCE
OUTPATIENT
Start: 2023-08-18

## 2023-08-21 ENCOUNTER — NURSE ONLY (OUTPATIENT)
Dept: HEMATOLOGY/ONCOLOGY | Facility: HOSPITAL | Age: 48
End: 2023-08-21
Attending: INTERNAL MEDICINE
Payer: COMMERCIAL

## 2023-08-21 VITALS
HEIGHT: 63 IN | OXYGEN SATURATION: 98 % | RESPIRATION RATE: 18 BRPM | SYSTOLIC BLOOD PRESSURE: 134 MMHG | WEIGHT: 213 LBS | DIASTOLIC BLOOD PRESSURE: 67 MMHG | HEART RATE: 70 BPM | TEMPERATURE: 98 F | BODY MASS INDEX: 37.74 KG/M2

## 2023-08-21 DIAGNOSIS — Z45.2 ENCOUNTER FOR CENTRAL LINE CARE: Primary | ICD-10-CM

## 2023-08-21 DIAGNOSIS — Z08 ENCOUNTER FOR FOLLOW-UP EXAMINATION AFTER COMPLETED TREATMENT FOR MALIGNANT NEOPLASM: ICD-10-CM

## 2023-08-21 DIAGNOSIS — E88.81 INSULIN RESISTANCE: ICD-10-CM

## 2023-08-21 DIAGNOSIS — Z00.00 ANNUAL PHYSICAL EXAM: ICD-10-CM

## 2023-08-21 DIAGNOSIS — Z08 ENCOUNTER FOR FOLLOW-UP SURVEILLANCE OF LYMPHOMA: ICD-10-CM

## 2023-08-21 DIAGNOSIS — C82.13 FOLLICULAR LYMPHOMA GRADE II OF INTRA-ABDOMINAL LYMPH NODES (HCC): Primary | ICD-10-CM

## 2023-08-21 DIAGNOSIS — Z85.72 ENCOUNTER FOR FOLLOW-UP SURVEILLANCE OF LYMPHOMA: ICD-10-CM

## 2023-08-21 LAB
ALBUMIN SERPL-MCNC: 3.7 G/DL (ref 3.4–5)
ALBUMIN/GLOB SERPL: 1.1 {RATIO} (ref 1–2)
ALP LIVER SERPL-CCNC: 65 U/L
ALT SERPL-CCNC: 64 U/L
ANION GAP SERPL CALC-SCNC: 6 MMOL/L (ref 0–18)
AST SERPL-CCNC: 34 U/L (ref 15–37)
BASOPHILS # BLD AUTO: 0.02 X10(3) UL (ref 0–0.2)
BASOPHILS NFR BLD AUTO: 0.4 %
BILIRUB SERPL-MCNC: 0.9 MG/DL (ref 0.1–2)
BUN BLD-MCNC: 10 MG/DL (ref 7–18)
BUN/CREAT SERPL: 13 (ref 10–20)
CALCIUM BLD-MCNC: 8.6 MG/DL (ref 8.5–10.1)
CHLORIDE SERPL-SCNC: 109 MMOL/L (ref 98–112)
CO2 SERPL-SCNC: 26 MMOL/L (ref 21–32)
CREAT BLD-MCNC: 0.77 MG/DL
DEPRECATED RDW RBC AUTO: 42.9 FL (ref 35.1–46.3)
EGFRCR SERPLBLD CKD-EPI 2021: 95 ML/MIN/1.73M2 (ref 60–?)
EOSINOPHIL # BLD AUTO: 0.13 X10(3) UL (ref 0–0.7)
EOSINOPHIL NFR BLD AUTO: 2.6 %
ERYTHROCYTE [DISTWIDTH] IN BLOOD BY AUTOMATED COUNT: 13.7 % (ref 11–15)
EST. AVERAGE GLUCOSE BLD GHB EST-MCNC: 134 MG/DL (ref 68–126)
GLOBULIN PLAS-MCNC: 3.3 G/DL (ref 2.8–4.4)
GLUCOSE BLD-MCNC: 114 MG/DL (ref 70–99)
HBA1C MFR BLD: 6.3 % (ref ?–5.7)
HCT VFR BLD AUTO: 41.6 %
HGB BLD-MCNC: 13.9 G/DL
IMM GRANULOCYTES # BLD AUTO: 0.01 X10(3) UL (ref 0–1)
IMM GRANULOCYTES NFR BLD: 0.2 %
LDH SERPL L TO P-CCNC: 219 U/L
LYMPHOCYTES # BLD AUTO: 1.59 X10(3) UL (ref 1–4)
LYMPHOCYTES NFR BLD AUTO: 31.2 %
MCH RBC QN AUTO: 28.5 PG (ref 26–34)
MCHC RBC AUTO-ENTMCNC: 33.4 G/DL (ref 31–37)
MCV RBC AUTO: 85.4 FL
MONOCYTES # BLD AUTO: 0.61 X10(3) UL (ref 0.1–1)
MONOCYTES NFR BLD AUTO: 12 %
NEUTROPHILS # BLD AUTO: 2.73 X10 (3) UL (ref 1.5–7.7)
NEUTROPHILS # BLD AUTO: 2.73 X10(3) UL (ref 1.5–7.7)
NEUTROPHILS NFR BLD AUTO: 53.6 %
OSMOLALITY SERPL CALC.SUM OF ELEC: 292 MOSM/KG (ref 275–295)
PLATELET # BLD AUTO: 307 10(3)UL (ref 150–450)
POTASSIUM SERPL-SCNC: 4 MMOL/L (ref 3.5–5.1)
PROT SERPL-MCNC: 7 G/DL (ref 6.4–8.2)
RBC # BLD AUTO: 4.87 X10(6)UL
SODIUM SERPL-SCNC: 141 MMOL/L (ref 136–145)
TSI SER-ACNC: 2.85 MIU/ML (ref 0.36–3.74)
WBC # BLD AUTO: 5.1 X10(3) UL (ref 4–11)

## 2023-08-21 PROCEDURE — 80053 COMPREHEN METABOLIC PANEL: CPT

## 2023-08-21 PROCEDURE — 36591 DRAW BLOOD OFF VENOUS DEVICE: CPT

## 2023-08-21 PROCEDURE — 3044F HG A1C LEVEL LT 7.0%: CPT | Performed by: NURSE PRACTITIONER

## 2023-08-21 PROCEDURE — 84443 ASSAY THYROID STIM HORMONE: CPT

## 2023-08-21 PROCEDURE — 83036 HEMOGLOBIN GLYCOSYLATED A1C: CPT

## 2023-08-21 PROCEDURE — 83615 LACTATE (LD) (LDH) ENZYME: CPT

## 2023-08-21 PROCEDURE — 99213 OFFICE O/P EST LOW 20 MIN: CPT | Performed by: INTERNAL MEDICINE

## 2023-08-21 PROCEDURE — 85025 COMPLETE CBC W/AUTO DIFF WBC: CPT

## 2023-08-21 RX ORDER — HEPARIN SODIUM (PORCINE) LOCK FLUSH IV SOLN 100 UNIT/ML 100 UNIT/ML
5 SOLUTION INTRAVENOUS ONCE
Status: COMPLETED | OUTPATIENT
Start: 2023-08-21 | End: 2023-08-21

## 2023-08-21 RX ORDER — SODIUM CHLORIDE 9 MG/ML
10 INJECTION INTRAVENOUS ONCE
OUTPATIENT
Start: 2023-08-21

## 2023-08-21 RX ORDER — HEPARIN SODIUM (PORCINE) LOCK FLUSH IV SOLN 100 UNIT/ML 100 UNIT/ML
5 SOLUTION INTRAVENOUS ONCE
OUTPATIENT
Start: 2023-08-21

## 2023-08-21 RX ADMIN — HEPARIN SODIUM (PORCINE) LOCK FLUSH IV SOLN 100 UNIT/ML 500 UNITS: 100 SOLUTION INTRAVENOUS at 11:08:00

## 2023-08-22 ENCOUNTER — TELEPHONE (OUTPATIENT)
Facility: CLINIC | Age: 48
End: 2023-08-22

## 2023-08-22 ENCOUNTER — OFFICE VISIT (OUTPATIENT)
Facility: CLINIC | Age: 48
End: 2023-08-22

## 2023-08-22 VITALS
BODY MASS INDEX: 36.37 KG/M2 | SYSTOLIC BLOOD PRESSURE: 125 MMHG | HEART RATE: 89 BPM | HEIGHT: 64 IN | WEIGHT: 213 LBS | DIASTOLIC BLOOD PRESSURE: 84 MMHG

## 2023-08-22 DIAGNOSIS — R13.10 DYSPHAGIA, UNSPECIFIED TYPE: ICD-10-CM

## 2023-08-22 DIAGNOSIS — R14.0 BLOATING: ICD-10-CM

## 2023-08-22 DIAGNOSIS — R14.2 BELCHING: ICD-10-CM

## 2023-08-22 DIAGNOSIS — K62.5 BRBPR (BRIGHT RED BLOOD PER RECTUM): ICD-10-CM

## 2023-08-22 DIAGNOSIS — R13.19 ESOPHAGEAL DYSPHAGIA: ICD-10-CM

## 2023-08-22 DIAGNOSIS — R19.7 DIARRHEA, UNSPECIFIED TYPE: ICD-10-CM

## 2023-08-22 DIAGNOSIS — K76.0 HEPATIC STEATOSIS: Primary | ICD-10-CM

## 2023-08-22 DIAGNOSIS — R19.7 DIARRHEA, UNSPECIFIED TYPE: Primary | ICD-10-CM

## 2023-08-22 DIAGNOSIS — Z12.11 COLON CANCER SCREENING: ICD-10-CM

## 2023-08-22 DIAGNOSIS — R16.0 HEPATOMEGALY: ICD-10-CM

## 2023-08-22 DIAGNOSIS — A04.8 HELICOBACTER PYLORI (H. PYLORI) INFECTION: ICD-10-CM

## 2023-08-22 DIAGNOSIS — R12 HEARTBURN: ICD-10-CM

## 2023-08-22 PROCEDURE — 3079F DIAST BP 80-89 MM HG: CPT | Performed by: NURSE PRACTITIONER

## 2023-08-22 PROCEDURE — 99215 OFFICE O/P EST HI 40 MIN: CPT | Performed by: NURSE PRACTITIONER

## 2023-08-22 PROCEDURE — 3074F SYST BP LT 130 MM HG: CPT | Performed by: NURSE PRACTITIONER

## 2023-08-22 PROCEDURE — 3008F BODY MASS INDEX DOCD: CPT | Performed by: NURSE PRACTITIONER

## 2023-08-22 RX ORDER — SODIUM, POTASSIUM,MAG SULFATES 17.5-3.13G
SOLUTION, RECONSTITUTED, ORAL ORAL
Qty: 1 EACH | Refills: 0 | Status: SHIPPED | OUTPATIENT
Start: 2023-08-22

## 2023-08-22 NOTE — TELEPHONE ENCOUNTER
Scheduled for:  Colonoscopy 38486/10962 EGD [id number]  Provider Name:  Dr Graciela Coughlin  Date:  1/8/2024  Location:  [facility]  Sedation:  MAC  Time:  9:00 am. (pt is aware to arrive at 8:00 am)  Prep:  Split Suprep  Meds/Allergies Reconciled?:  Ankur Peoples NP, Reviewed   Diagnosis with codes:    Diarrhea R19.7  BRBPR K62.5  Dysphagia R13.19  Heartburn R12  Bloating R14.0  Was patient informed to call insurance with codes (Y/N):  Yes   Referral sent?:  Referral was sent at the time of electronic surgical scheduling. [facility] or [address] notified?:  I sent an electronic request to Endo Scheduling and received a confirmation today. Medication Orders:  Pt is aware to NOT take iron pills, herbal meds and diet supplements for 7 days before exam. Also to NOT take any form of alcohol, recreational drugs and any forms of ED meds 24 hours before exam.   Misc Orders:  N/A   Further instructions given by staff:  I discussed the prep instructions with the patient which she verbally understood. I provided patient with prep instruction's sheet in office. Patient was informed about the new cancellation policy for his/her procedure. Patient was also given a copy of the cancellation policy at the time of the appointment and verbalized understanding.

## 2023-09-13 ENCOUNTER — LAB ENCOUNTER (OUTPATIENT)
Dept: LAB | Age: 48
End: 2023-09-13
Attending: NURSE PRACTITIONER
Payer: COMMERCIAL

## 2023-09-13 DIAGNOSIS — Z12.11 COLON CANCER SCREENING: ICD-10-CM

## 2023-09-13 DIAGNOSIS — R14.0 BLOATING: ICD-10-CM

## 2023-09-13 DIAGNOSIS — R14.2 BELCHING: ICD-10-CM

## 2023-09-13 DIAGNOSIS — R19.7 DIARRHEA, UNSPECIFIED TYPE: ICD-10-CM

## 2023-09-13 DIAGNOSIS — K76.0 HEPATIC STEATOSIS: ICD-10-CM

## 2023-09-13 DIAGNOSIS — A04.8 HELICOBACTER PYLORI (H. PYLORI) INFECTION: ICD-10-CM

## 2023-09-13 DIAGNOSIS — R16.0 HEPATOMEGALY: ICD-10-CM

## 2023-09-13 LAB
CERULOPLASMIN SERPL-MCNC: 28.2 MG/DL (ref 20–60)
IGA SERPL-MCNC: 106 MG/DL (ref 70–312)
IGM SERPL-MCNC: 22.4 MG/DL (ref 43–279)
IMMUNOGLOBULIN PNL SER-MCNC: 1200 MG/DL (ref 791–1643)

## 2023-09-13 PROCEDURE — 82390 ASSAY OF CERULOPLASMIN: CPT

## 2023-09-13 PROCEDURE — 87046 STOOL CULTR AEROBIC BACT EA: CPT

## 2023-09-13 PROCEDURE — 82784 ASSAY IGA/IGD/IGG/IGM EACH: CPT

## 2023-09-13 PROCEDURE — 87045 FECES CULTURE AEROBIC BACT: CPT

## 2023-09-13 PROCEDURE — 87338 HPYLORI STOOL AG IA: CPT

## 2023-09-13 PROCEDURE — 87340 HEPATITIS B SURFACE AG IA: CPT

## 2023-09-13 PROCEDURE — 87493 C DIFF AMPLIFIED PROBE: CPT

## 2023-09-13 PROCEDURE — 83516 IMMUNOASSAY NONANTIBODY: CPT

## 2023-09-13 PROCEDURE — 36415 COLL VENOUS BLD VENIPUNCTURE: CPT

## 2023-09-13 PROCEDURE — 86706 HEP B SURFACE ANTIBODY: CPT

## 2023-09-13 PROCEDURE — 87427 SHIGA-LIKE TOXIN AG IA: CPT

## 2023-09-14 LAB
ACTIN SMOOTH MUSCLE AB: 3 UNITS
C DIFF TOX B STL QL: NEGATIVE
H PYLORI AG STL QL IA: NEGATIVE
HBV SURFACE AB SER QL: NONREACTIVE
HBV SURFACE AB SERPL IA-ACNC: <3.1 MIU/ML
HBV SURFACE AG SER-ACNC: <0.1 [IU]/L
HBV SURFACE AG SERPL QL IA: NONREACTIVE
M2 MITOCHONDRIAL AB: <20 UNITS

## 2023-09-27 ENCOUNTER — OFFICE VISIT (OUTPATIENT)
Dept: INTERNAL MEDICINE CLINIC | Facility: CLINIC | Age: 48
End: 2023-09-27

## 2023-09-27 VITALS
HEART RATE: 83 BPM | WEIGHT: 215.81 LBS | OXYGEN SATURATION: 96 % | DIASTOLIC BLOOD PRESSURE: 81 MMHG | BODY MASS INDEX: 38.24 KG/M2 | HEIGHT: 63 IN | RESPIRATION RATE: 16 BRPM | SYSTOLIC BLOOD PRESSURE: 131 MMHG

## 2023-09-27 DIAGNOSIS — E78.00 PURE HYPERCHOLESTEROLEMIA: ICD-10-CM

## 2023-09-27 DIAGNOSIS — E88.819 INSULIN RESISTANCE: ICD-10-CM

## 2023-09-27 DIAGNOSIS — M54.50 CHRONIC BILATERAL LOW BACK PAIN WITHOUT SCIATICA: ICD-10-CM

## 2023-09-27 DIAGNOSIS — G89.29 CHRONIC BILATERAL LOW BACK PAIN WITHOUT SCIATICA: ICD-10-CM

## 2023-09-27 DIAGNOSIS — Z28.21 IMMUNIZATION NOT CARRIED OUT BECAUSE OF PATIENT REFUSAL: ICD-10-CM

## 2023-09-27 DIAGNOSIS — Z00.00 ANNUAL PHYSICAL EXAM: Primary | ICD-10-CM

## 2023-09-27 DIAGNOSIS — C82.13 FOLLICULAR LYMPHOMA GRADE II OF INTRA-ABDOMINAL LYMPH NODES (HCC): ICD-10-CM

## 2023-09-27 DIAGNOSIS — Z23 NEED FOR VACCINATION: ICD-10-CM

## 2023-09-27 DIAGNOSIS — E78.1 HYPERTRIGLYCERIDEMIA: ICD-10-CM

## 2023-09-27 PROCEDURE — 3008F BODY MASS INDEX DOCD: CPT | Performed by: INTERNAL MEDICINE

## 2023-09-27 PROCEDURE — 3079F DIAST BP 80-89 MM HG: CPT | Performed by: INTERNAL MEDICINE

## 2023-09-27 PROCEDURE — 3075F SYST BP GE 130 - 139MM HG: CPT | Performed by: INTERNAL MEDICINE

## 2023-09-27 PROCEDURE — 99213 OFFICE O/P EST LOW 20 MIN: CPT | Performed by: INTERNAL MEDICINE

## 2023-09-27 PROCEDURE — 90471 IMMUNIZATION ADMIN: CPT | Performed by: INTERNAL MEDICINE

## 2023-09-27 PROCEDURE — 90746 HEPB VACCINE 3 DOSE ADULT IM: CPT | Performed by: INTERNAL MEDICINE

## 2023-09-27 PROCEDURE — 99396 PREV VISIT EST AGE 40-64: CPT | Performed by: INTERNAL MEDICINE

## 2023-09-29 ENCOUNTER — PATIENT MESSAGE (OUTPATIENT)
Facility: CLINIC | Age: 48
End: 2023-09-29

## 2023-10-02 NOTE — TELEPHONE ENCOUNTER
Charlane Ganser,   I see your note on labs recommending vaccine with pcp. Does pt need anything else? Just proceed with EGD/colon as planned in January?   Thanks,  Kesha Chong

## 2023-10-02 NOTE — TELEPHONE ENCOUNTER
From: [facility]  To: Mariela Ramires. Gail Marques  Sent: 9/29/2023 9:14 PM CDT  Subject: Follow up    Hello, so results are in for bloodwork and stool. What do I do next? Still schedule Upper endoscopy and Colonoscopy?

## 2023-10-03 RX ORDER — OMEPRAZOLE 40 MG/1
40 CAPSULE, DELAYED RELEASE ORAL DAILY
Qty: 30 CAPSULE | Refills: 3 | Status: SHIPPED | OUTPATIENT
Start: 2023-10-03

## 2023-10-03 NOTE — TELEPHONE ENCOUNTER
Pt contacted by adrian. Advised:  Reflux diet modification  Trial of omeprazole once daily  Advised pt contact office to further discuss diarrhea and reported pressure in her bottom. Lmtcb.   Omeprazole sent e-scribe

## 2023-10-06 ENCOUNTER — HOSPITAL ENCOUNTER (OUTPATIENT)
Dept: MAMMOGRAPHY | Facility: HOSPITAL | Age: 48
Discharge: HOME | End: 2023-10-06
Attending: INTERNAL MEDICINE
Payer: COMMERCIAL

## 2023-10-06 DIAGNOSIS — Z12.31 ENCOUNTER FOR SCREENING MAMMOGRAM FOR MALIGNANT NEOPLASM OF BREAST: ICD-10-CM

## 2023-10-06 PROCEDURE — 77067 SCR MAMMO BI INCL CAD: CPT | Performed by: INTERNAL MEDICINE

## 2023-10-06 PROCEDURE — 77063 BREAST TOMOSYNTHESIS BI: CPT | Performed by: INTERNAL MEDICINE

## 2023-10-16 ENCOUNTER — HOSPITAL ENCOUNTER (OUTPATIENT)
Dept: MAMMOGRAPHY | Facility: HOSPITAL | Age: 48
Discharge: HOME | End: 2023-10-16
Attending: INTERNAL MEDICINE
Payer: COMMERCIAL

## 2023-10-16 DIAGNOSIS — R92.8 ABNORMAL MAMMOGRAM: ICD-10-CM

## 2023-10-16 PROCEDURE — 77065 DX MAMMO INCL CAD UNI: CPT | Performed by: INTERNAL MEDICINE

## 2023-10-16 PROCEDURE — 77061 BREAST TOMOSYNTHESIS UNI: CPT | Performed by: INTERNAL MEDICINE

## 2023-11-08 NOTE — LETTER
Hospital Discharge    Patient Name: Tigre Lozano  Age/Sex: 54 y.o. male  : 1968  MRN: [id number]    Encounter Provider: MERRITT Hyde  Referring Provider: Tigre Leon MD  Place of Service: Pineville Community [address] [place] [address]  Authorization for Invasive Procedures  1.  I hereby authorize BUFFY Butterfield MD , my physician and whomever may be designated as the doctor's assistant, to perform the following operation and/or procedure:  CT gu that can occur: fever and allergic reactions, hemolytic reactions, transmission of disease such as hepatitis, AIDS, cytomegalovirus (CMV), and flluid overload.  In the event that I wish to have autologous transfusions of my own blood, or a directed donor tr physician.      Signature of Patient:  ________________________________________________ Date: _________Time: _________    Responsible person in case of minor or unconscious: _____________________________Relationship: ____________     Witness Signature: ____ Vale Monge MD [facility]      Follow-up Information       Ben Brooks MD .    Specialty: Family  Medicine  Contact information:  [address] [place] [address] [phone]                             Test Results Pending at Discharge:      MERRITT Hyde  11/08/23  12:18 EST    Time: Discharge < 30 min

## 2023-11-20 ENCOUNTER — OFFICE VISIT (OUTPATIENT)
Dept: SURGERY | Facility: CLINIC | Age: 48
End: 2023-11-20
Payer: COMMERCIAL

## 2023-11-20 VITALS
SYSTOLIC BLOOD PRESSURE: 130 MMHG | HEIGHT: 63.5 IN | DIASTOLIC BLOOD PRESSURE: 80 MMHG | HEART RATE: 104 BPM | BODY MASS INDEX: 38.5 KG/M2 | WEIGHT: 220 LBS | OXYGEN SATURATION: 97 %

## 2023-11-20 DIAGNOSIS — Z51.81 ENCOUNTER FOR THERAPEUTIC DRUG MONITORING: ICD-10-CM

## 2023-11-20 DIAGNOSIS — E11.9 TYPE 2 DIABETES MELLITUS WITHOUT COMPLICATION, WITHOUT LONG-TERM CURRENT USE OF INSULIN (HCC): Primary | ICD-10-CM

## 2023-11-20 DIAGNOSIS — E78.1 HYPERTRIGLYCERIDEMIA: ICD-10-CM

## 2023-11-20 DIAGNOSIS — F43.9 STRESS: ICD-10-CM

## 2023-11-20 DIAGNOSIS — E66.9 OBESITY (BMI 30-39.9): ICD-10-CM

## 2023-11-20 RX ORDER — PHENTERMINE HYDROCHLORIDE 15 MG/1
15 CAPSULE ORAL EVERY MORNING
Qty: 30 CAPSULE | Refills: 2 | Status: SHIPPED | OUTPATIENT
Start: 2023-11-20

## 2023-11-20 RX ORDER — SEMAGLUTIDE 0.68 MG/ML
0.25 INJECTION, SOLUTION SUBCUTANEOUS WEEKLY
Qty: 3 ML | Refills: 3 | Status: SHIPPED | OUTPATIENT
Start: 2023-11-20 | End: 2023-12-20

## 2023-11-21 ENCOUNTER — TELEPHONE (OUTPATIENT)
Dept: SURGERY | Facility: CLINIC | Age: 48
End: 2023-11-21

## 2023-11-22 ENCOUNTER — NURSE ONLY (OUTPATIENT)
Dept: HEMATOLOGY/ONCOLOGY | Facility: HOSPITAL | Age: 48
End: 2023-11-22
Attending: INTERNAL MEDICINE
Payer: COMMERCIAL

## 2023-11-22 VITALS
OXYGEN SATURATION: 97 % | DIASTOLIC BLOOD PRESSURE: 84 MMHG | SYSTOLIC BLOOD PRESSURE: 138 MMHG | BODY MASS INDEX: 38.8 KG/M2 | HEIGHT: 63 IN | WEIGHT: 219 LBS | HEART RATE: 85 BPM | RESPIRATION RATE: 18 BRPM | TEMPERATURE: 98 F

## 2023-11-22 DIAGNOSIS — Z08 ENCOUNTER FOR FOLLOW-UP SURVEILLANCE OF LYMPHOMA: ICD-10-CM

## 2023-11-22 DIAGNOSIS — Z85.72 ENCOUNTER FOR FOLLOW-UP SURVEILLANCE OF LYMPHOMA: ICD-10-CM

## 2023-11-22 DIAGNOSIS — C82.13 FOLLICULAR LYMPHOMA GRADE II OF INTRA-ABDOMINAL LYMPH NODES (HCC): Primary | ICD-10-CM

## 2023-11-22 DIAGNOSIS — Z45.2 ENCOUNTER FOR CENTRAL LINE CARE: Primary | ICD-10-CM

## 2023-11-22 DIAGNOSIS — Z00.00 ANNUAL PHYSICAL EXAM: ICD-10-CM

## 2023-11-22 DIAGNOSIS — E78.00 PURE HYPERCHOLESTEROLEMIA: ICD-10-CM

## 2023-11-22 LAB
ALBUMIN SERPL-MCNC: 4.1 G/DL (ref 3.2–4.8)
ALBUMIN/GLOB SERPL: 1.6 {RATIO} (ref 1–2)
ALP LIVER SERPL-CCNC: 68 U/L
ALT SERPL-CCNC: 54 U/L
ANION GAP SERPL CALC-SCNC: 5 MMOL/L (ref 0–18)
AST SERPL-CCNC: 28 U/L (ref ?–34)
BASOPHILS # BLD AUTO: 0.04 X10(3) UL (ref 0–0.2)
BASOPHILS NFR BLD AUTO: 0.5 %
BILIRUB SERPL-MCNC: 0.8 MG/DL (ref 0.3–1.2)
BUN BLD-MCNC: 9 MG/DL (ref 9–23)
BUN/CREAT SERPL: 13 (ref 10–20)
CALCIUM BLD-MCNC: 8.9 MG/DL (ref 8.7–10.4)
CHLORIDE SERPL-SCNC: 105 MMOL/L (ref 98–112)
CHOLEST SERPL-MCNC: 215 MG/DL (ref ?–200)
CO2 SERPL-SCNC: 25 MMOL/L (ref 21–32)
CREAT BLD-MCNC: 0.69 MG/DL
DEPRECATED RDW RBC AUTO: 38.7 FL (ref 35.1–46.3)
EGFRCR SERPLBLD CKD-EPI 2021: 106 ML/MIN/1.73M2 (ref 60–?)
EOSINOPHIL # BLD AUTO: 0.16 X10(3) UL (ref 0–0.7)
EOSINOPHIL NFR BLD AUTO: 1.9 %
ERYTHROCYTE [DISTWIDTH] IN BLOOD BY AUTOMATED COUNT: 12.6 % (ref 11–15)
FASTING PATIENT LIPID ANSWER: YES
GLOBULIN PLAS-MCNC: 2.6 G/DL (ref 2.8–4.4)
GLUCOSE BLD-MCNC: 98 MG/DL (ref 70–99)
HCT VFR BLD AUTO: 41.3 %
HDLC SERPL-MCNC: 44 MG/DL (ref 40–59)
HGB BLD-MCNC: 13.6 G/DL
IMM GRANULOCYTES # BLD AUTO: 0.03 X10(3) UL (ref 0–1)
IMM GRANULOCYTES NFR BLD: 0.4 %
LDH SERPL L TO P-CCNC: 181 U/L
LDLC SERPL CALC-MCNC: 140 MG/DL (ref ?–100)
LYMPHOCYTES # BLD AUTO: 1.41 X10(3) UL (ref 1–4)
LYMPHOCYTES NFR BLD AUTO: 16.8 %
MCH RBC QN AUTO: 27.9 PG (ref 26–34)
MCHC RBC AUTO-ENTMCNC: 32.9 G/DL (ref 31–37)
MCV RBC AUTO: 84.6 FL
MONOCYTES # BLD AUTO: 0.84 X10(3) UL (ref 0.1–1)
MONOCYTES NFR BLD AUTO: 10 %
NEUTROPHILS # BLD AUTO: 5.93 X10 (3) UL (ref 1.5–7.7)
NEUTROPHILS # BLD AUTO: 5.93 X10(3) UL (ref 1.5–7.7)
NEUTROPHILS NFR BLD AUTO: 70.4 %
NONHDLC SERPL-MCNC: 171 MG/DL (ref ?–130)
OSMOLALITY SERPL CALC.SUM OF ELEC: 279 MOSM/KG (ref 275–295)
PLATELET # BLD AUTO: 300 10(3)UL (ref 150–450)
POTASSIUM SERPL-SCNC: 3.9 MMOL/L (ref 3.5–5.1)
PROT SERPL-MCNC: 6.7 G/DL (ref 5.7–8.2)
RBC # BLD AUTO: 4.88 X10(6)UL
SODIUM SERPL-SCNC: 135 MMOL/L (ref 136–145)
TRIGL SERPL-MCNC: 175 MG/DL (ref 30–149)
VLDLC SERPL CALC-MCNC: 33 MG/DL (ref 0–30)
WBC # BLD AUTO: 8.4 X10(3) UL (ref 4–11)

## 2023-11-22 PROCEDURE — 80061 LIPID PANEL: CPT

## 2023-11-22 PROCEDURE — 99213 OFFICE O/P EST LOW 20 MIN: CPT | Performed by: INTERNAL MEDICINE

## 2023-11-22 PROCEDURE — 83615 LACTATE (LD) (LDH) ENZYME: CPT

## 2023-11-22 PROCEDURE — 36591 DRAW BLOOD OFF VENOUS DEVICE: CPT

## 2023-11-22 PROCEDURE — 85025 COMPLETE CBC W/AUTO DIFF WBC: CPT

## 2023-11-22 PROCEDURE — 80053 COMPREHEN METABOLIC PANEL: CPT

## 2023-11-22 RX ORDER — HEPARIN SODIUM (PORCINE) LOCK FLUSH IV SOLN 100 UNIT/ML 100 UNIT/ML
5 SOLUTION INTRAVENOUS ONCE
OUTPATIENT
Start: 2023-11-22

## 2023-11-22 RX ORDER — SODIUM CHLORIDE 9 MG/ML
10 INJECTION INTRAVENOUS ONCE
OUTPATIENT
Start: 2023-11-22

## 2023-11-22 RX ORDER — HEPARIN SODIUM (PORCINE) LOCK FLUSH IV SOLN 100 UNIT/ML 100 UNIT/ML
5 SOLUTION INTRAVENOUS ONCE
Status: COMPLETED | OUTPATIENT
Start: 2023-11-22 | End: 2023-11-22

## 2023-11-22 RX ADMIN — HEPARIN SODIUM (PORCINE) LOCK FLUSH IV SOLN 100 UNIT/ML 500 UNITS: 100 SOLUTION INTRAVENOUS at 09:34:00

## 2023-11-27 ENCOUNTER — PATIENT MESSAGE (OUTPATIENT)
Facility: CLINIC | Age: 48
End: 2023-11-27

## 2023-11-27 ENCOUNTER — NURSE ONLY (OUTPATIENT)
Dept: INTERNAL MEDICINE CLINIC | Facility: CLINIC | Age: 48
End: 2023-11-27
Payer: COMMERCIAL

## 2023-11-27 DIAGNOSIS — Z23 NEED FOR VACCINATION: Primary | ICD-10-CM

## 2023-11-27 PROCEDURE — 90746 HEPB VACCINE 3 DOSE ADULT IM: CPT | Performed by: INTERNAL MEDICINE

## 2023-11-27 PROCEDURE — 90471 IMMUNIZATION ADMIN: CPT | Performed by: INTERNAL MEDICINE

## 2023-11-27 NOTE — TELEPHONE ENCOUNTER
RN ROEL regarding MyChart message received. Instructed pt to call GI office to discuss further if needed. GI office number provided.

## 2023-11-27 NOTE — TELEPHONE ENCOUNTER
From: [facility]  To: Caroline Ku  Sent: 11/27/2023 10:06 AM CST  Subject: Appt question    Good morning, curious if I still need an upper endoscopy as it's scheduled for Feb?

## 2023-11-28 DIAGNOSIS — E78.1 HYPERTRIGLYCERIDEMIA: ICD-10-CM

## 2023-11-28 DIAGNOSIS — E78.00 PURE HYPERCHOLESTEROLEMIA: Primary | ICD-10-CM

## 2023-11-28 RX ORDER — ROSUVASTATIN CALCIUM 20 MG/1
20 TABLET, COATED ORAL NIGHTLY
Qty: 90 TABLET | Refills: 0 | Status: SHIPPED | OUTPATIENT
Start: 2023-11-28

## 2023-12-08 ENCOUNTER — NURSE TRIAGE (OUTPATIENT)
Dept: INTERNAL MEDICINE CLINIC | Facility: CLINIC | Age: 48
End: 2023-12-08

## 2023-12-08 NOTE — TELEPHONE ENCOUNTER
Action Requested: Summary for Provider     []  Critical Lab, Recommendations Needed  [] Need Additional Advice  [x]   FYI    []   Need Orders  [] Need Medications Sent to Pharmacy  []  Other     SUMMARY: Appointment Monday. Rectal pain. If worse, will seek evaluation sooner. If severe will go to ER. Reason for call: Rectal Pain  Onset: for the past 6 months or so. Patient called office. Date of birth and full name both confirmed. Wondering if she needs to see a specialist now. Reports chronic issues with intermittent hemorrhoids - using OTC remedies and medications. But in most recent months, seems to be more frequent. Not constipated . Has normal soft bowel movements. But after bowel movement, burning sensation on rectum. Triaged per protocol and advised care advice per protocol. More details under Additional Documentation > Protocols Used. Evaluation advised , offered Walk in clinic today. Wants to be seen on Monday. Appointment made. Advised to monitor symptoms. RN advised if symptoms get severely worse, patient should seek care at Emergency Room or Immediate Care. RN also informed patient to seek immediate medical attention at ER if patient experiences severe/worsening symptoms, shortness of breath, chest pain, or severe pain. Patient verbalizes understanding and is agreeable to instructions. Reason for Disposition   Patient wants to be seen    Protocols used: Rectal Symptoms-A-[place]    [address] [facility]   to  Em Triage Support (supporting Anaya Rod MD)         12/8/23 10:54 AM  I'm still having really bad rectal pressure. Using prep H wipes and ointment as recommended but it's not helping. Feels fine when walking but horrible discomfort when sitting. Care Advice             Patient/Caregiver understands and will follow care advice?: Yes, with modifications                        SEE IN OFFICE TODAY OR TOMORROW:   * You need to be examined.  Let me give you an appointment. * IF NO AVAILABLE OFFICE APPOINTMENTS: You need to be seen in an Urgent [facility]. Go there today. A nearby Urgent Care Center is often a good source of care. WARM SALINE SITZ BATH - FOR RECTAL PAIN DUE TO CONSTIPATION:  * Sit in a warm sitz bath for 20 minutes twice a day. * A SITZ bath may help relax the muscles around your rectum and make it easier to have a bowel movement. * Afterwards, pat area dry with unscented toilet paper. REASSURANCE AND EDUCATION - MILD RECTAL PAIN OR IRRITATION:  * Rectal pain and irritation can often be caused by either hemorrhoids or a tiny tear in the rectal opening (anal fissure). Home treatment can usually make the discomfort feel better. * Small drops of blood can sometimes be seen on the toilet paper or stool in people with hemorrhoids or rectal irritation from hard bowel movements. * Here is some care advice that should help. WARM SALINE SITZ BATHS - FOR RECTAL SYMPTOMS:  * Sit in a warm sitz bath for 20 minutes twice a day. * This will decrease swelling and irritation, keep the area clean, and help with healing. * Afterwards, pat area dry with unscented toilet paper. WARM SALINE SITZ BATH - HOW TO MAKE A SITZ BATH:  * Here is how you can make a saline sitz bath. * Fill the tub with warm water until it is 3 to 4 inches (7 to 10 cm) deep. * Add 1/4 cup (80 g) of table salt or baking soda to a tub of warm water. Stir the water until it dissolves. CLEANING AFTER A BOWEL MOVEMENT:  * Clean the anus with warm water after each bowel movement. Use wet cotton or tissue. * Pat the area dry using unscented toilet paper. Avoid rubbing area with toilet paper. * Keeping your bottom clean and dry will help prevent itching. PREVENTING CONSTIPATION:  * Eat a high fiber diet. * Drink adequate liquids. * Exercise regularly (even a daily 15 minute walk!)  * Get into a rhythm - try to have a BM at the same time each day.   * Don't ignore your body's signals regarding having a BM. * Avoid enemas and stimulant laxatives.     CALL BACK IF:  * Severe rectal pain  * Rectal pain not improved after 3 days  * Rectal bleeding is more than minor (e.g., more than just blood on toilet paper or few drops)  * Rectal bleeding is minor but is ongoing (e.g., occurs over 2 times)  * You become worse

## 2023-12-11 ENCOUNTER — OFFICE VISIT (OUTPATIENT)
Dept: INTERNAL MEDICINE CLINIC | Facility: CLINIC | Age: 48
End: 2023-12-11
Payer: COMMERCIAL

## 2023-12-11 VITALS
SYSTOLIC BLOOD PRESSURE: 148 MMHG | BODY MASS INDEX: 38.09 KG/M2 | HEIGHT: 63 IN | DIASTOLIC BLOOD PRESSURE: 92 MMHG | RESPIRATION RATE: 16 BRPM | WEIGHT: 215 LBS | HEART RATE: 90 BPM

## 2023-12-11 DIAGNOSIS — K62.89 RECTAL PAIN: Primary | ICD-10-CM

## 2023-12-11 PROCEDURE — 3080F DIAST BP >= 90 MM HG: CPT | Performed by: NURSE PRACTITIONER

## 2023-12-11 PROCEDURE — 3077F SYST BP >= 140 MM HG: CPT | Performed by: NURSE PRACTITIONER

## 2023-12-11 PROCEDURE — 99213 OFFICE O/P EST LOW 20 MIN: CPT | Performed by: NURSE PRACTITIONER

## 2023-12-11 PROCEDURE — 3008F BODY MASS INDEX DOCD: CPT | Performed by: NURSE PRACTITIONER

## 2023-12-11 RX ORDER — HYDROCORTISONE ACETATE 25 MG/1
25 SUPPOSITORY RECTAL 2 TIMES DAILY
Qty: 28 SUPPOSITORY | Refills: 0 | Status: SHIPPED | OUTPATIENT
Start: 2023-12-11 | End: 2023-12-25

## 2023-12-11 RX ORDER — OMEPRAZOLE 20 MG/1
20 CAPSULE, DELAYED RELEASE ORAL
COMMUNITY

## 2023-12-14 ENCOUNTER — PATIENT MESSAGE (OUTPATIENT)
Dept: INTERNAL MEDICINE CLINIC | Facility: CLINIC | Age: 48
End: 2023-12-14

## 2023-12-14 NOTE — TELEPHONE ENCOUNTER
Pt calling again to speak with RN. She wants to know if she still needs CLN in January. Pt also wants to know how she got scheduled with Dr Anh Thurman when Dr Rhiannon Verdugo has done previous CLN's.   Please call

## 2023-12-15 NOTE — TELEPHONE ENCOUNTER
Pt was given hydrocortisone suppositories on 12/11. Please advise further.  (Is set to have colonoscopy on 01/08/24.)

## 2023-12-19 ENCOUNTER — TELEPHONE (OUTPATIENT)
Facility: CLINIC | Age: 48
End: 2023-12-19

## 2023-12-19 DIAGNOSIS — R12 HEARTBURN: ICD-10-CM

## 2023-12-19 DIAGNOSIS — R14.0 BLOATING: Primary | ICD-10-CM

## 2023-12-19 DIAGNOSIS — R13.19 OTHER DYSPHAGIA: ICD-10-CM

## 2023-12-19 NOTE — TELEPHONE ENCOUNTER
Hp testing neg 8/2023 and started omeprazole 40 mg/daily. Reflux controlled and she denies dysphagia, n/v, abd pain, unintentional weight loss. Has lost 7 lb intentionally-seeing Dr. Julissa Borden and started phentermine. She reports that she has been having daily bm. She denies diarrhea. Has had issues with hemorrhoids and was having burning pain and rectal pressure. Saw pcp about a week ago and was treated for eh, ih with anusol. Cream helped for a day, but then symptoms returned. No brbpr, melena with issues. Lowest effective dose of omeprazole  Reflux diet modification  Continue to work on weight loss with dr. Rodney Houston or [facility]  Anusol cream twice daily x 14 days  Sitz baths 2-3x/day    F/u if on-going symptoms    She is electing to cancel her upper endoscopy and will monitor her symptoms-f/u if return of symptoms and determine need for egd. Plan for cln as scheduled. She was scheduled with Dr. Aria Alcantara and does not want to reschedule to a later date.   Plan for cln as scheduled with Dr. Aria Alcantara    -cancel egd  -plan for cln as scheduled  Hold phentermine as instructed by rn staff    She verbalizes understanding of above and is in agreement with the plan

## 2023-12-19 NOTE — TELEPHONE ENCOUNTER
[id number]  Provider Name:  Dr Raisa Cheatham  Date:  1/8/2024  Location:  [facility]  Sedation:  MAC  Time:  9:00 am. (pt is aware to arrive at 8:00 am)  Prep:  Split Suprep  Meds/Allergies Reconciled?:  Phoenix Kelly NP, Reviewed   Diagnosis with codes:    Diarrhea R19.7  BRBPR K62.5  Dysphagia R13.19  Heartburn R12  Bloating R14.0  Was patient informed to call insurance with codes (Y/N):  Yes   Referral sent?:  Referral was sent at the time of electronic surgical scheduling. [facility] or [id number] notified?:  I sent an electronic request to Endo Scheduling and received a confirmation today. Medication Orders:  Pt is aware to NOT take iron pills, herbal meds and diet supplements for 7 days before exam. Also to NOT take any form of alcohol, recreational drugs and any forms of ED meds 24 hours before exam.   Misc Orders:  N/A   Further instructions given by staff:  I discussed the prep instructions with the patient which she verbally understood. I provided patient with prep instruction's sheet in office.

## 2023-12-19 NOTE — TELEPHONE ENCOUNTER
Tono Bryant,   Please clarify: do you want pt to have colon only with Shae on 1/8? I didn't cancel any portion of it yet. Please advise.   Thanks,  Quentin Soliz

## 2023-12-19 NOTE — TELEPHONE ENCOUNTER
Johnathan May,   Not urgent but time sensitive as EGD/colon scheduled for 1/8/24. Pt saw you in office on 8/22/23. Pt was scheduled for EGD/colon with Dr. Manfred Lockhart though you order said to have done with Dr. Delphine Velasquez (he did colon in 2019). Pt asking if procedures are really necessary as she is feeling better. Pt states she is feeling better overall; no swallowing issues anymore but still has baseline loose stools and \"burning pressure in rectum\". Pt sounds agreeable to colonoscopy but not to EGD anymore. Initially wanted Dr. Delphine Velasquez but now is agreeable to seeing Dr. Manfred Lockhart as it may be a few months before she could get scheduled with Delphine Velasquez. How do you want to proceed? Of note, she is now on phentermine and would need to hold it for a week prior to procedure. Thanks,  Candance Pipes      RN informed pt that she would need to hold phentermine for 7 days (8 days including procedure day) before procedure. Informed pt that a [employer]ver message will be sent as a reminder. Pt verbalized understanding.

## 2023-12-20 RX ORDER — SODIUM, POTASSIUM,MAG SULFATES 17.5-3.13G
SOLUTION, RECONSTITUTED, ORAL ORAL
Qty: 1 EACH | Refills: 0 | Status: SHIPPED | OUTPATIENT
Start: 2023-12-20

## 2023-12-29 NOTE — PAT NURSING NOTE
Patient instructed to hold PHENTERMINE 7 days prior to procedure per the Pre-surgical testing policy.

## 2024-01-08 ENCOUNTER — HOSPITAL ENCOUNTER (OUTPATIENT)
Facility: HOSPITAL | Age: 49
Setting detail: HOSPITAL OUTPATIENT SURGERY
Discharge: HOME | End: 2024-01-08
Attending: INTERNAL MEDICINE | Admitting: INTERNAL MEDICINE
Payer: COMMERCIAL

## 2024-01-08 ENCOUNTER — ANESTHESIA EVENT (OUTPATIENT)
Dept: ENDOSCOPY | Facility: HOSPITAL | Age: 49
End: 2024-01-08
Payer: COMMERCIAL

## 2024-01-08 ENCOUNTER — ANESTHESIA (OUTPATIENT)
Dept: ENDOSCOPY | Facility: HOSPITAL | Age: 49
End: 2024-01-08
Payer: COMMERCIAL

## 2024-01-08 VITALS
OXYGEN SATURATION: 97 % | HEART RATE: 76 BPM | BODY MASS INDEX: 34.15 KG/M2 | SYSTOLIC BLOOD PRESSURE: 131 MMHG | TEMPERATURE: 98 F | RESPIRATION RATE: 16 BRPM | DIASTOLIC BLOOD PRESSURE: 95 MMHG | WEIGHT: 200 LBS | HEIGHT: 64 IN

## 2024-01-08 DIAGNOSIS — K62.5 BRBPR (BRIGHT RED BLOOD PER RECTUM): ICD-10-CM

## 2024-01-08 DIAGNOSIS — R19.7 DIARRHEA, UNSPECIFIED TYPE: ICD-10-CM

## 2024-01-08 PROCEDURE — 45380 COLONOSCOPY AND BIOPSY: CPT | Performed by: INTERNAL MEDICINE

## 2024-01-08 PROCEDURE — 0DBM8ZX EXCISION OF DESCENDING COLON, VIA NATURAL OR ARTIFICIAL OPENING ENDOSCOPIC, DIAGNOSTIC: ICD-10-PCS | Performed by: INTERNAL MEDICINE

## 2024-01-08 RX ORDER — SODIUM CHLORIDE, SODIUM LACTATE, POTASSIUM CHLORIDE, CALCIUM CHLORIDE 600; 310; 30; 20 MG/100ML; MG/100ML; MG/100ML; MG/100ML
INJECTION, SOLUTION INTRAVENOUS CONTINUOUS
Status: DISCONTINUED | OUTPATIENT
Start: 2024-01-08 | End: 2024-01-08

## 2024-01-08 RX ORDER — LIDOCAINE HYDROCHLORIDE 10 MG/ML
INJECTION, SOLUTION EPIDURAL; INFILTRATION; INTRACAUDAL; PERINEURAL AS NEEDED
Status: DISCONTINUED | OUTPATIENT
Start: 2024-01-08 | End: 2024-01-08 | Stop reason: SURG

## 2024-01-08 RX ORDER — NALOXONE HYDROCHLORIDE 0.4 MG/ML
0.08 INJECTION, SOLUTION INTRAMUSCULAR; INTRAVENOUS; SUBCUTANEOUS ONCE AS NEEDED
Status: DISCONTINUED | OUTPATIENT
Start: 2024-01-08 | End: 2024-01-08

## 2024-01-08 RX ADMIN — SODIUM CHLORIDE, SODIUM LACTATE, POTASSIUM CHLORIDE, CALCIUM CHLORIDE: 600; 310; 30; 20 INJECTION, SOLUTION INTRAVENOUS at 08:50:00

## 2024-01-08 RX ADMIN — SODIUM CHLORIDE, SODIUM LACTATE, POTASSIUM CHLORIDE, CALCIUM CHLORIDE: 600; 310; 30; 20 INJECTION, SOLUTION INTRAVENOUS at 09:14:00

## 2024-01-08 RX ADMIN — LIDOCAINE HYDROCHLORIDE 25 MG: 10 INJECTION, SOLUTION EPIDURAL; INFILTRATION; INTRACAUDAL; PERINEURAL at 08:53:00

## 2024-01-08 NOTE — DISCHARGE INSTRUCTIONS
Home Care Instructions for Colonoscopy  with Sedation    Diet:  - Resume your regular diet as tolerated unless otherwise instructed.  - Start with light meals to minimize bloating.  - Do not drink alcohol today.    Medication:  - If you have questions about resuming your normal medications, please contact your Primary Care Physician.    Activities:  - Take it easy today. Do not return to work today.  - Do not drive today.  - Do not operate any machinery today (including kitchen equipment).    Colonoscopy:  - You may notice some rectal \"spotting\" (a little blood on the toilet tissue) for a day or two after the exam. This is normal.  - If you experience any rectal bleeding (not spotting), persistent tenderness or sharp severe abdominal pains, oral temperature over 100 degrees Fahrenheit, light-headedness or dizziness, or any other problems, contact your doctor.    **If unable to reach your doctor, please go to the [facility] Emergency Room**    - Your referring physician will receive a full report of your examination.  - If you do not hear from your doctor's office within two weeks of your biopsy, please call them for your results.    You may be able to see your laboratory results in [employer] between 4 and 7 business days.  In some cases, your physician may not have viewed the results before they are released to [employer].  If you have questions regarding your results contact the physician who ordered the test/exam by phone or via [employer] by choosing \"Ask a Medical Question.\"

## 2024-01-08 NOTE — ANESTHESIA PREPROCEDURE EVALUATION
Anesthesia PreOp Note    HPI:     Azul Joseph is a 48 year old female who presents for preoperative consultation requested by: Barrie Kaufman MD    Date of Surgery: 1/8/2024    Procedure(s):  COLONOSCOPY  Indication: Diarrhea, unspecified type /BRBPR (bright red blood per rectum) /    Relevant Problems   No relevant active problems       NPO:  Last Liquid Consumption Date: 01/08/24  Last Liquid Consumption Time: 0330  Last Solid Consumption Date: 01/07/24  Last Solid Consumption Time: 0930  Last Liquid Consumption Date: 01/08/24          History Review:  Patient Active Problem List    Diagnosis Date Noted    Stress 11/20/2023    Type 2 diabetes mellitus without complication, without long-term current use of insulin (HCC) 11/20/2023    Complex cyst of left ovary 03/21/2023    Lumbar spondylosis 02/28/2023    Biomechanical lesion 02/28/2023    DDD (degenerative disc disease), lumbosacral 02/28/2023    Hypertriglyceridemia 02/27/2023    Insulin resistance 02/27/2023    Left ovarian cyst 02/07/2023    Symptomatic menopausal or female climacteric states 11/30/2022    Foot pain, bilateral 10/12/2022    Chemotherapy follow-up examination 04/07/2022    Infusion reaction 03/11/2022    Encounter for therapeutic drug monitoring 01/19/2022    Encounter for central line care 01/13/2022    Follicular lymphoma grade II of intra-abdominal lymph nodes (HCC) 12/16/2021    Rectal bleeding 03/21/2019    Lower abdominal pain 03/21/2019    Anal pruritus 03/21/2019    L5-S1 mild central & right mild foraminal & left mild-mod foraminal bulging disc 07/06/2017    L5-S1 grade 1 Retrolisthesis with S1 lumbarized 07/06/2017    L5-S1 left mild foraminal stenosis 07/06/2017    left L5-S1 radiculopathy 07/06/2017    Snoring 02/14/2017    Mechanical low back pain 02/14/2017    Pure hypercholesterolemia 07/12/2016    Obesity (BMI 30-39.9) 04/01/2016    Family history of breast cancer in mother 04/01/2016    Skin lesion of back 03/23/2015     Polycystic ovaries 2012       Past Medical History:   Diagnosis Date    Allergic rhinitis 5 years ago    Anxiety 3 years ago    occasional    Artificial insemination     Back problem     DDD    Cancer (HCC)     non hodgkin's lymphoma    Cholecystitis     Laser cholecystectomy     Decorative tattoo     Degenerative lumbar disc 2005    Degenerative L2 disc, Therapy    H/O pregnancy , , , 2009    EAB x 2-, ;  X 3- , ?, 2009    Helicobacter positive gastritis 2021    Noted on EGD; no ulcer. On abx    High cholesterol     Hormone disorder     Hyperlipidemia     Infertility     Art insemination    Lipid screening 03/10/2014    per NG    Obesity (BMI 30-39.9)     Obesity, unspecified     Polycystic ovaries     Artificial insemination/clomid    Screen for colon cancer 2019    no adenomatous polyp; repeat CLN in 10 yrs    Tonsillitis     Tonsillectomy    Type 2 diabetes mellitus without complication, without long-term current use of insulin ([facility]) 2023       Past Surgical History:   Procedure Laterality Date           X 3, , ?,        &     CHOLECYSTECTOMY      laser cholecystectomy    COLONOSCOPY  2019    SKIN SURGERY      #2 moles removed on back    TONSILLECTOMY         Medications Prior to Admission   Medication Sig Dispense Refill Last Dose    omeprazole 20 MG Oral Capsule Delayed Release Take 1 capsule (20 mg total) by mouth every morning before breakfast.   2024    [] hydrocortisone (ANUSOL-HC) 25 MG Rectal Suppos Place 1 suppository (25 mg total) rectally 2 (two) times daily for 14 days. 28 suppository 0     rosuvastatin 20 MG Oral Tab Take 1 tablet (20 mg total) by mouth nightly. 90 tablet 0 2024    Phentermine HCl 15 MG Oral Cap Take 1 capsule (15 mg total) by mouth every morning. 30 capsule 2 2023    Na Sulfate-K Sulfate-Mg Sulf (SUPREP BOWEL PREP KIT) 17.5-3.13-1.6 GM/177ML Oral  Solution Take as directed 1 each 0      Current Facility-Administered Medications Ordered in Epic   Medication Dose Route Frequency Provider Last Rate Last Admin    lactated ringers infusion   Intravenous Continuous Barrie Kaufman MD         No current [facility]-ordered outpatient medications on file.       Allergies   Allergen Reactions    Latex RASH and ITCHING     redness       Family History   Problem Relation Age of Onset    Diabetes Father 65    Obesity Father     Hypertension Mother     High Cholesterol Mother     Diabetes Mother         Bord DM    Cancer Mother 55        Breast Cancer    Heart Disorder Mother         coronary stent    Breast Cancer Mother 55    Heart Attack Mother     Obesity Mother     Alcohol and Other Disorders Associated Maternal Grandmother         alcoholism    Cancer Maternal Grandmother         Cancer-lung, age 42 (cause of death)    Diabetes Maternal Grandfather     Other (No hyperlipidemia) Other     Cancer Self 46        non hodgkins lymphoma     Social History     Socioeconomic History    Marital status:    Tobacco Use    Smoking status: Former     Packs/day: 0     Types: Cigarettes     Quit date: 1993     Years since quittin.7    Smokeless tobacco: Never   Vaping Use    Vaping Use: Never used   Substance and Sexual Activity    Alcohol use: Yes     Alcohol/week: 1.0 standard drink of alcohol     Types: 1 Standard drinks or equivalent per week     Comment: very occasional    Drug use: No    Sexual activity: Yes     Partners: Male     Birth control/protection: Vasectomy   Other Topics Concern    Caffeine Concern Yes     Comment: Coffee, 1 cup daily    Exercise Yes     Comment: walking daily    History of tanning Yes    Breast feeding No    Reaction to local anesthetic No       Available pre-op labs reviewed.  Lab Results   Component Value Date    WBC 8.4 2023    RBC 4.88 2023    HGB 13.6 2023    HCT 41.3 2023    MCV 84.6 2023    MCH 27.9  11/22/2023    MCHC 32.9 11/22/2023    RDW 12.6 11/22/2023    .0 11/22/2023     Lab Results   Component Value Date     (L) 11/22/2023    K 3.9 11/22/2023     11/22/2023    CO2 25.0 11/22/2023    BUN 9 11/22/2023    CREATSERUM 0.69 11/22/2023    GLU 98 11/22/2023    CA 8.9 11/22/2023          Vital Signs:  Body mass index is 34.33 kg/m².   height is 1.626 m (5' 4\") and weight is 90.7 kg (200 lb). Her blood pressure is 132/79 and her pulse is 77. Her respiration is 14 and oxygen saturation is 99%.   Vitals:    12/29/23 1153 01/08/24 0825   BP:  132/79   Pulse:  77   Resp:  14   SpO2:  99%   Weight: 90.7 kg (200 lb)    Height: 1.626 m (5' 4\")         Anesthesia Evaluation     Patient summary reviewed and Nursing notes reviewed    No history of anesthetic complications   Airway   Mallampati: II  TM distance: >3 FB  Neck ROM: full  Dental - Dentition appears grossly intact     Pulmonary - negative ROS and normal exam   Cardiovascular - normal exam  Exercise tolerance: good    ROS comment: High cholesterol    Neuro/Psych    (+)  neuromuscular disease, anxiety/panic attacks,        GI/Hepatic/Renal    (+) bowel prep    Endo/Other    (+) diabetes mellitus type 2 well controlled  Abdominal   (+) obese                 Anesthesia Plan:   ASA:  2  Plan:   General and MAC  Informed Consent Plan and Risks Discussed With:  Patient and spouse  Discussed plan with:  CRNA and surgeon      I have informed Azul Joseph and/or legal guardian or family member of the nature of the anesthetic plan, benefits, risks including possible dental damage if relevant, major complications, and any alternative forms of anesthetic management.   All of the patient's questions were answered to the best of my ability. The patient desires the anesthetic management as planned.  Alta Gilbert CRNA  1/8/2024 8:41 AM  Present on Admission:  **None**

## 2024-01-08 NOTE — ANESTHESIA POSTPROCEDURE EVALUATION
Patient: Azul Beyer Martin    Procedure Summary       Date: 01/08/24 Room / Location: [facility] ENDOSCOPY 04 / [facility] ENDOSCOPY    Anesthesia Start: 0850 Anesthesia Stop: 0915    Procedure: COLONOSCOPY Diagnosis:       Diarrhea, unspecified type      BRBPR (bright red blood per rectum)      (colon polyp, hemorrhoids)    Surgeons: Barire Kaufman MD Anesthesiologist: Alta Gilbert CRNA    Anesthesia Type: general, MAC ASA Status: 2            Anesthesia Type: general, MAC    Vitals Value Taken Time   /80 01/08/24 0914   Temp 98 °F (36.7 °C) 01/08/24 0914   Pulse 77 01/08/24 0914   Resp 16 01/08/24 0914   SpO2 98 % 01/08/24 0914       [facility] AN Post Evaluation:   Patient Evaluated in PACU  Patient Participation: complete - patient participated  Level of Consciousness: sleepy but conscious  Pain Score: 0  Pain Management: adequate  Airway Patency:patent  Dental exam unchanged from preop  Yes    Cardiovascular Status: acceptable  Respiratory Status: acceptable  Postoperative Hydration acceptable      Alta Gilbert CRNA  1/8/2024 9:15 AM

## 2024-01-08 NOTE — PRE-SEDATION ASSESSMENT
Physician Pre-Sedation Assessment    Pre-Sedation Assessment:    Sedation History: Previous Sedation with No Complications and Airway Assessed    Cardiac: normal S1, S2  Respiratory: breath sounds clear bilaterally   Abdomen: soft, BS (+), non-tender    ASA Classification: 3. Patient with severe systemic disease    Plan: Mac sedation

## 2024-01-08 NOTE — H&P
Pre Procedure History & Physical Examination    Patient Name: Azul Joseph  MRN: [id number]  CSN: [id number]  YOB: 1975    Diagnosis:diarrhea. Rectal pain and change in bowels    Medications Prior to Admission   Medication Sig Dispense Refill Last Dose    omeprazole 20 MG Oral Capsule Delayed Release Take 1 capsule (20 mg total) by mouth every morning before breakfast.   2024    [] hydrocortisone (ANUSOL-HC) 25 MG Rectal Suppos Place 1 suppository (25 mg total) rectally 2 (two) times daily for 14 days. 28 suppository 0     rosuvastatin 20 MG Oral Tab Take 1 tablet (20 mg total) by mouth nightly. 90 tablet 0 2024    Phentermine HCl 15 MG Oral Cap Take 1 capsule (15 mg total) by mouth every morning. 30 capsule 2 2023    Na Sulfate-K Sulfate-Mg Sulf (SUPREP BOWEL PREP KIT) 17.5-3.13-1.6 GM/177ML Oral Solution Take as directed 1 each 0      Current Facility-Administered Medications   Medication Dose Route Frequency    lactated ringers infusion   Intravenous Continuous       Allergies:   Allergies   Allergen Reactions    Latex RASH and ITCHING     redness       Past Medical History:   Diagnosis Date    Allergic rhinitis 5 years ago    Anxiety 3 years ago    occasional    Artificial insemination     Back problem     DDD    Cancer (HCC)     non hodgkin's lymphoma    Cholecystitis 2011    Laser cholecystectomy 2011    Decorative tattoo     Degenerative lumbar disc 2005    Degenerative L2 disc, Therapy    H/O pregnancy , , 2003, 2009    EAB x 2-, ;  X 3- , ?, 2009    Helicobacter positive gastritis 2021    Noted on EGD; no ulcer. On abx    High cholesterol     Hormone disorder     Hyperlipidemia     Infertility     Art insemination    Lipid screening 03/10/2014    per NG    Obesity (BMI 30-39.9)     Obesity, unspecified     Polycystic ovaries     Artificial insemination/clomid    Screen for colon cancer 2019    no adenomatous polyp; repeat  CLN in 10 yrs    Tonsillitis     Tonsillectomy    Type 2 diabetes mellitus without complication, without long-term current use of insulin (HCC) 2023     Past Surgical History:   Procedure Laterality Date           X 3, 2003, ?, 2009       &     CHOLECYSTECTOMY  2011    laser cholecystectomy    COLONOSCOPY  2019    SKIN SURGERY  2014    #2 moles removed on back    TONSILLECTOMY       Family History   Problem Relation Age of Onset    Diabetes Father 65    Obesity Father     Hypertension Mother     High Cholesterol Mother     Diabetes Mother         Bord DM    Cancer Mother 55        Breast Cancer    Heart Disorder Mother         coronary stent    Breast Cancer Mother 55    Heart Attack Mother     Obesity Mother     Alcohol and Other Disorders Associated Maternal Grandmother         alcoholism    Cancer Maternal Grandmother         Cancer-lung, age 42 (cause of death)    Diabetes Maternal Grandfather     Other (No hyperlipidemia) Other     Cancer Self 46        non hodgkins lymphoma     Social History     Tobacco Use    Smoking status: Former     Packs/day: 0     Types: Cigarettes     Quit date: 1993     Years since quittin.7    Smokeless tobacco: Never   Substance Use Topics    Alcohol use: Yes     Alcohol/week: 1.0 standard drink of alcohol     Types: 1 Standard drinks or equivalent per week     Comment: very occasional         ROS:   CONSTITUTIONAL:  negative for fevers, rigors  EYES:  negative for diplopia   RESPIRATORY:  negative for severe shortness of breath  CARDIOVASCULAR:  negative for crushing sub-sternal chest pain  GASTROINTESTINAL:  see HPI  GENITOURINARY:  negative for dysuria or gross hematuria  INTEGUMENT/BREAST:  SKIN:  negative for jaundice   ALLERGIC/IMMUNOLOGIC:  negative for hay fever  ENDOCRINE:  negative for cold intolerance and heat intolerance  MUSCULOSKELETAL:  negative for joint effusion/severe erythema  BEHAVIOR/PSYCH:  negative for psychotic  behavior      PHYSICAL EXAM:   /79 (BP Location: Right arm)   Pulse 77   Resp 14   Ht 5' 4\" (1.626 m)   Wt 200 lb (90.7 kg)   LMP 05/08/2023 (Exact Date)   SpO2 99%   BMI 34.33 kg/m²       Gen: Patient appears comfortable and in no acute discomfort  HEENT: the sclera appears anicteric, oropharynx clear, mucus membranes appear moist  CV: regular rate and rhythm, the extremities are warm and well perfused   Lung: Moves air well; No labored breathing  Abdomen: soft, non-tender exam in all quadrants without rigidity or guarding, non-distended, no abnormal bowel sounds noted, no masses are palpated  Skin: No jaundice  Ext: no cyanosis, clubbing or edema is evident.   Neuro: Alert and interactive, and gross movements of extremities normal    I have discussed the risks and benefits and alternatives of the procedure with the patient/family.  They understand and agree to proceed with plan of care.   I have reviewed recent H&P/clinic notes  Barrie Kaufman MD  [facility] - Gastroenterology  1/8/2024  8:38 AM

## 2024-01-08 NOTE — OPERATIVE REPORT
COLONOSCOPY REPORT    Azul Joseph     1975 Age 48 year old   PCP Weston Jordan MD Endoscopist Barrie Kaufman MD     Date of procedure: 24    Procedure: Colonoscopy w/cold biopsy polypectomy    Pre-operative diagnosis: rectal pain and changes in bowel    Post-operative diagnosis: polyp and hemorrhoids    Medications: MAC sedation    Withdrawal time: 14 minutes    Procedure:  Informed consent was obtained from the patient after the risks of the procedure were discussed, including but not limited to bleeding, perforation, aspiration, infection, or possibility of a missed lesion. After discussions of the risks/benefits and alternatives to this procedure, as well as the planned sedation, the patient was placed in the left lateral decubitus position and begun on continuous blood pressure pulse oximetry and EKG monitoring and this was maintained throughout the procedure. Once an adequate level of sedation was obtained a digital rectal exam was completed. Then the lubricated tip of the [id number] diagnostic video colonoscope was inserted and advanced without difficulty to the cecum using the CO2 insufflation technique. The cecum was identified by localizing the trifold, the appendix and the ileocecal valve. Withdrawal was begun with thorough washing and careful examination of the colonic walls and folds. A routine second examination of the cecum/ascending colon was performed. Photodocumentation was obtained. The bowel prep was good. Views of the colon were good with washing. I then carefully withdrew the instrument from the patient who tolerated the procedure well.     Complications: none.    Findings:   1. 1 polyp(s) noted as follows:      A. 3 mm polyp in the descending colon; flat morphology; cold biopsy polypectomy and retrieved.    2. Diverticulosis: none appreciated.    3. Terminal ileum: the visualized mucosa appeared normal.    4. A retroflexed view of the rectum revealed hemorrhoids,  prolapsing .    5. The colonic mucosa throughout the colon showed normal vascular pattern, without evidence of angioectasias or inflammation.     6. EARL: normal rectal tone, no masses palpated.     Recommend:  Await pathology, likely repeat colonoscopy in 7-10 years. The interval for the next colonoscopy will be determined after reviewing pathology. If new signs or symptoms develop, colonoscopy may need to be repeated sooner.   High fiber diet.  Monitor for blood in the stool. If having more than just tinge of blood, call office or go to the ER.    >>>If tissue was obtained and you have not received your pathology results either by phone or letter within 2 weeks, please call our office at [phone].    Specimens: descending polyp

## 2024-01-09 ENCOUNTER — TELEPHONE (OUTPATIENT)
Facility: CLINIC | Age: 49
End: 2024-01-09

## 2024-01-09 NOTE — TELEPHONE ENCOUNTER
Recall colon in 7 years per Dr. Kaufman. Colonoscopy done on 1/8/24.    Health maintenance updated and message sent to pt outreach to repeat colon in 7 years.    MyChart message from MD read by pt on 1/8/24.

## 2024-01-09 NOTE — TELEPHONE ENCOUNTER
----- Message from Barrie Kaufman MD sent at 1/8/2024  3:24 PM CST -----  GI staff: please place recall for colonoscopy in 7 years

## 2024-01-16 ENCOUNTER — TELEPHONE (OUTPATIENT)
Dept: SURGERY | Facility: CLINIC | Age: 49
End: 2024-01-16

## 2024-01-16 NOTE — TELEPHONE ENCOUNTER
Azul stopped her phentermine 7 days prior to Colonoscopy is it ok to restart also has some questions regarding Ozempic. She is aware that you are out of town until 1/18/2024

## 2024-01-22 ENCOUNTER — OFFICE VISIT (OUTPATIENT)
Dept: INTERNAL MEDICINE CLINIC | Facility: CLINIC | Age: 49
End: 2024-01-22
Payer: COMMERCIAL

## 2024-01-22 VITALS
SYSTOLIC BLOOD PRESSURE: 132 MMHG | WEIGHT: 213 LBS | RESPIRATION RATE: 16 BRPM | BODY MASS INDEX: 36.37 KG/M2 | HEART RATE: 87 BPM | HEIGHT: 64 IN | DIASTOLIC BLOOD PRESSURE: 90 MMHG

## 2024-01-22 DIAGNOSIS — K62.89 RECTAL PAIN: Primary | ICD-10-CM

## 2024-01-22 DIAGNOSIS — K64.8 INTERNAL HEMORRHOIDS: ICD-10-CM

## 2024-01-22 PROCEDURE — 3008F BODY MASS INDEX DOCD: CPT | Performed by: NURSE PRACTITIONER

## 2024-01-22 PROCEDURE — 99213 OFFICE O/P EST LOW 20 MIN: CPT | Performed by: NURSE PRACTITIONER

## 2024-01-22 PROCEDURE — 3075F SYST BP GE 130 - 139MM HG: CPT | Performed by: NURSE PRACTITIONER

## 2024-01-22 PROCEDURE — 3080F DIAST BP >= 90 MM HG: CPT | Performed by: NURSE PRACTITIONER

## 2024-01-22 RX ORDER — HYDROCORTISONE ACETATE 25 MG/1
25 SUPPOSITORY RECTAL 2 TIMES DAILY
Qty: 28 SUPPOSITORY | Refills: 0 | Status: SHIPPED | OUTPATIENT
Start: 2024-01-22 | End: 2024-02-05

## 2024-01-22 RX ORDER — OMEPRAZOLE 40 MG/1
40 CAPSULE, DELAYED RELEASE ORAL DAILY
COMMUNITY

## 2024-01-22 RX ORDER — HYDROCORTISONE 25 MG/G
1 CREAM TOPICAL 2 TIMES DAILY
Qty: 28 G | Refills: 0 | Status: SHIPPED | OUTPATIENT
Start: 2024-01-22

## 2024-01-22 NOTE — PROGRESS NOTES
Azul Joseph is a 48 year old female.  Chief Complaint   Patient presents with    Rectal Pain     Tingling sensation     HPI:   She presents with intermittent symptoms of pain and pressure in the rectal area.     She has been having symptoms x 2 months. She tried Anusol suppositories with little relief.     She is doing Citrucel daily.     She states she is not straining when having a BM. Her stools are soft but the pain comes after having a BM. She denies any blood in her stool or abdominal pain.       Current Outpatient Medications   Medication Sig Dispense Refill    Omeprazole 40 MG Oral Capsule Delayed Release Take 1 capsule (40 mg total) by mouth daily. Take 1 tablet every other day      hydrocortisone 2.5 % External Cream Place 1 Application rectally 2 (two) times daily. 28 g 0    hydrocortisone (ANUSOL-HC) 25 MG Rectal Suppos Place 1 suppository (25 mg total) rectally 2 (two) times daily for 14 days. 28 suppository 0    rosuvastatin 20 MG Oral Tab Take 1 tablet (20 mg total) by mouth nightly. 90 tablet 0    Phentermine HCl 15 MG Oral Cap Take 1 capsule (15 mg total) by mouth every morning. 30 capsule 2      Past Medical History:   Diagnosis Date    Allergic rhinitis 5 years ago    Anxiety 3 years ago    occasional    Artificial insemination     Back problem     DDD    Cancer (HCC)     non hodgkin's lymphoma    Cholecystitis     Laser cholecystectomy     Decorative tattoo     Degenerative lumbar disc 2005    Degenerative L2 disc, Therapy    H/O pregnancy , , 2003, 2009    EAB x 2-, ;  X 3- , ?, 2009    Helicobacter positive gastritis 2021    Noted on EGD; no ulcer. On abx    High cholesterol     Hormone disorder     Hyperlipidemia     Infertility     Art insemination    Lipid screening 03/10/2014    per NG    Obesity (BMI 30-39.9)     Obesity, unspecified     Polycystic ovaries 2008    Artificial insemination/clomid    Screen for colon cancer 2019    no  adenomatous polyp; repeat CLN in 10 yrs    Tonsillitis     Tonsillectomy    Type 2 diabetes mellitus without complication, without long-term current use of insulin (HCC) 2023      Past Surgical History:   Procedure Laterality Date           X 3, , ?, 2009       &     CHOLECYSTECTOMY      laser cholecystectomy    COLONOSCOPY  2019    COLONOSCOPY N/A 2024    Procedure: COLONOSCOPY;  Surgeon: Barrie Kaufman MD;  Location: [facility] ENDOSCOPY    SKIN SURGERY      #2 moles removed on back    TONSILLECTOMY        Social History:  Social History     Socioeconomic History    Marital status:    Tobacco Use    Smoking status: Former     Packs/day: 0     Types: Cigarettes     Quit date: 1993     Years since quittin.8    Smokeless tobacco: Never   Vaping Use    Vaping Use: Never used   Substance and Sexual Activity    Alcohol use: Yes     Alcohol/week: 1.0 standard drink of alcohol     Types: 1 Standard drinks or equivalent per week     Comment: very occasional    Drug use: No    Sexual activity: Yes     Partners: Male     Birth control/protection: Vasectomy   Other Topics Concern    Caffeine Concern Yes     Comment: Coffee, 1 cup daily    Exercise Yes     Comment: walking daily    History of tanning Yes    Breast feeding No    Reaction to local anesthetic No      Family History   Problem Relation Age of Onset    Diabetes Father 65    Obesity Father     Hypertension Mother     High Cholesterol Mother     Diabetes Mother         Bord DM    Cancer Mother 55        Breast Cancer    Heart Disorder Mother         coronary stent    Breast Cancer Mother 55    Heart Attack Mother     Obesity Mother     Alcohol and Other Disorders Associated Maternal Grandmother         alcoholism    Cancer Maternal Grandmother         Cancer-lung, age 42 (cause of death)    Diabetes Maternal Grandfather     Other (No hyperlipidemia) Other     Cancer Self 46        non hodgkins lymphoma       Allergies   Allergen Reactions    Latex RASH and ITCHING     redness        REVIEW OF SYSTEMS:     Review of Systems   Constitutional:  Negative for fever.   HENT: Negative.     Respiratory:  Negative for cough, shortness of breath and wheezing.    Cardiovascular:  Negative for chest pain.   Gastrointestinal:  Positive for rectal pain. Negative for abdominal pain.   Genitourinary: Negative.    Musculoskeletal: Negative.    Skin: Negative.    Neurological: Negative.    Psychiatric/Behavioral: Negative.        Wt Readings from Last 5 Encounters:   01/22/24 213 lb (96.6 kg)   12/29/23 200 lb (90.7 kg)   12/11/23 215 lb (97.5 kg)   11/22/23 219 lb (99.3 kg)   11/20/23 220 lb (99.8 kg)     Body mass index is 36.56 kg/m².      EXAM:   /90   Pulse 87   Resp 16   Ht 5' 4\" (1.626 m)   Wt 213 lb (96.6 kg)   LMP 05/08/2023 (Exact Date)   BMI 36.56 kg/m²     Physical Exam  Vitals reviewed.   Constitutional:       Appearance: Normal appearance.   HENT:      Head: Normocephalic.   Cardiovascular:      Rate and Rhythm: Normal rate and regular rhythm.      Pulses: Normal pulses.   Pulmonary:      Breath sounds: Normal breath sounds. No wheezing.   Abdominal:      General: Bowel sounds are normal.      Palpations: Abdomen is soft.      Tenderness: There is no abdominal tenderness.   Genitourinary:     Rectum: Internal hemorrhoid present.   Musculoskeletal:         General: No swelling. Normal range of motion.   Skin:     General: Skin is warm and dry.   Neurological:      Mental Status: She is alert and oriented to person, place, and time.   Psychiatric:         Mood and Affect: Mood normal.         Behavior: Behavior normal.            ASSESSMENT AND PLAN:   1. Rectal pain  - related to #2    2. Internal hemorrhoids  - per patient she has a had a history of hemorrhoids since she was pregnancy.   - she is not interested in a surgical evaluation at this time.   - hydrocortisone 2.5 % External Cream; Place 1 Application  rectally 2 (two) times daily.  Dispense: 28 g; Refill: 0  - hydrocortisone (ANUSOL-HC) 25 MG Rectal Suppos; Place 1 suppository (25 mg total) rectally 2 (two) times daily for 14 days.  Dispense: 28 suppository; Refill: 0      The patient indicates understanding of these issues and agrees to the plan.  Return for if symptoms do not resolve.

## 2024-02-01 ENCOUNTER — PATIENT MESSAGE (OUTPATIENT)
Dept: INTERNAL MEDICINE CLINIC | Facility: CLINIC | Age: 49
End: 2024-02-01

## 2024-02-01 ENCOUNTER — VIRTUAL PHONE E/M (OUTPATIENT)
Dept: SURGERY | Facility: CLINIC | Age: 49
End: 2024-02-01
Payer: COMMERCIAL

## 2024-02-01 VITALS — WEIGHT: 210 LBS | HEIGHT: 64 IN | BODY MASS INDEX: 35.85 KG/M2

## 2024-02-01 DIAGNOSIS — E11.9 TYPE 2 DIABETES MELLITUS WITHOUT COMPLICATION, WITHOUT LONG-TERM CURRENT USE OF INSULIN (HCC): Primary | ICD-10-CM

## 2024-02-01 DIAGNOSIS — E66.9 OBESITY (BMI 30-39.9): ICD-10-CM

## 2024-02-01 DIAGNOSIS — E78.1 HYPERTRIGLYCERIDEMIA: ICD-10-CM

## 2024-02-01 DIAGNOSIS — Z51.81 ENCOUNTER FOR THERAPEUTIC DRUG MONITORING: ICD-10-CM

## 2024-02-01 DIAGNOSIS — F43.9 STRESS: ICD-10-CM

## 2024-02-01 PROCEDURE — 99214 OFFICE O/P EST MOD 30 MIN: CPT | Performed by: INTERNAL MEDICINE

## 2024-02-01 RX ORDER — SEMAGLUTIDE 0.68 MG/ML
0.5 INJECTION, SOLUTION SUBCUTANEOUS WEEKLY
Qty: 3 ML | Refills: 3 | Status: SHIPPED | OUTPATIENT
Start: 2024-02-01 | End: 2024-03-02

## 2024-02-01 NOTE — PROGRESS NOTES
[facility], [facility], [place]  [address]  [facility] [address]  Dept: [phone]     Virtual Telephone Check-In    Azul Joseph verbally consents to a Virtual/Telephone Check-In visit on 24.  Patient has been referred to the [facility] website at [email or web address] to review the yearly Consent to Treat document.    Patient understands and accepts financial responsibility for any deductible, co-insurance and/or co-pays associated with this service.    Duration of the service: 22 minutes      Tele visit        Patient:  Azul Joseph  :      1975  MRN:      [id number]    Chief Complaint:    Chief Complaint   Patient presents with    Follow - Up     Non surgical weight loss. Tele visit       SUBJECTIVE     History of Present Illness:  Azul is being seen today for a follow-up for non surgical weight loss    Past Medical History:   Past Medical History:   Diagnosis Date    Allergic rhinitis 5 years ago    Anxiety 3 years ago    occasional    Artificial insemination     Back problem     DDD    Cancer (HCC)     non hodgkin's lymphoma    Cholecystitis 2011    Laser cholecystectomy     Decorative tattoo     Degenerative lumbar disc 2005    Degenerative L2 disc, Therapy    H/O pregnancy , , 2003, 2009    EAB x 2-, ;  X 3- , ?, 2009    Helicobacter positive gastritis 2021    Noted on EGD; no ulcer. On abx    High cholesterol     Hormone disorder     Hyperlipidemia     Infertility     Art insemination    Lipid screening 03/10/2014    per NG    Obesity (BMI 30-39.9)     Obesity, unspecified     Polycystic ovaries     Artificial insemination/clomid    Screen for colon cancer 2019    no adenomatous polyp; repeat CLN in 10 yrs    Tonsillitis     Tonsillectomy    Type 2 diabetes mellitus without complication, without long-term current use of insulin (HCC) 2023        Comorbidities:  Back  pain-Improvement?  yes, Joint pain-Improvement?  yes, SHAUNNA-Improvement?  yes, and Snoring-Improvement?  yes    OBJECTIVE     Vitals: Ht 5' 4\" (1.626 m)   Wt 210 lb (95.3 kg)   LMP 2023 (Exact Date)   BMI 36.05 kg/m²     Initial weight loss: -10   Total weight loss: -05    Start weight: 215    Wt Readings from Last 3 Encounters:   24 210 lb (95.3 kg)   24 213 lb (96.6 kg)   23 200 lb (90.7 kg)       Patient Medications:    Current Outpatient Medications   Medication Sig Dispense Refill    Omeprazole 40 MG Oral Capsule Delayed Release Take 1 capsule (40 mg total) by mouth daily. Take 1 tablet every other day      hydrocortisone 2.5 % External Cream Place 1 Application rectally 2 (two) times daily. 28 g 0    hydrocortisone (ANUSOL-HC) 25 MG Rectal Suppos Place 1 suppository (25 mg total) rectally 2 (two) times daily for 14 days. 28 suppository 0    rosuvastatin 20 MG Oral Tab Take 1 tablet (20 mg total) by mouth nightly. 90 tablet 0    Phentermine HCl 15 MG Oral Cap Take 1 capsule (15 mg total) by mouth every morning. 30 capsule 2     Allergies:  Latex     Social History:    Social History     Socioeconomic History    Marital status:      Spouse name: Not on file    Number of children: Not on file    Years of education: Not on file    Highest education level: Not on file   Occupational History    Not on file   Tobacco Use    Smoking status: Former     Packs/day: 0     Types: Cigarettes     Quit date: 1993     Years since quittin.8    Smokeless tobacco: Never   Vaping Use    Vaping Use: Never used   Substance and Sexual Activity    Alcohol use: Yes     Alcohol/week: 1.0 standard drink of alcohol     Types: 1 Standard drinks or equivalent per week     Comment: very occasional    Drug use: No    Sexual activity: Yes     Partners: Male     Birth control/protection: Vasectomy   Other Topics Concern     Service Not Asked    Blood Transfusions Not Asked    Caffeine Concern Yes      Comment: Coffee, 1 cup daily    Occupational Exposure Not Asked    Hobby Hazards Not Asked    Sleep Concern Not Asked    Stress Concern Not Asked    Weight Concern Not Asked    Special Diet Not Asked    Back Care Not Asked    Exercise Yes     Comment: walking daily    Bike Helmet Not Asked    Seat Belt Not Asked    Self-Exams Not Asked    Grew up on a farm Not Asked    History of tanning Yes    Outdoor occupation Not Asked    Pt has a pacemaker Not Asked    Pt has a defibrillator Not Asked    Breast feeding No    Reaction to local anesthetic No   Social History Narrative    Not on file     Social Determinants of Health     Financial Resource Strain: Not on file   Food Insecurity: Not on file   Transportation Needs: Not on file   Physical Activity: Not on file   Stress: Not on file   Social Connections: Not on file   Housing Stability: Not on file     Surgical History:    Past Surgical History:   Procedure Laterality Date           X 3, , ?, 2009       &     CHOLECYSTECTOMY      laser cholecystectomy    COLONOSCOPY  2019    COLONOSCOPY N/A 2024    Procedure: COLONOSCOPY;  Surgeon: Barrie Kaufman MD;  Location: [facility] ENDOSCOPY    SKIN SURGERY      #2 moles removed on back    TONSILLECTOMY       Family History:    Family History   Problem Relation Age of Onset    Diabetes Father 65    Obesity Father     Hypertension Mother     High Cholesterol Mother     Diabetes Mother         Bord DM    Cancer Mother 55        Breast Cancer    Heart Disorder Mother         coronary stent    Breast Cancer Mother 55    Heart Attack Mother     Obesity Mother     Alcohol and Other Disorders Associated Maternal Grandmother         alcoholism    Cancer Maternal Grandmother         Cancer-lung, age 42 (cause of death)    Diabetes Maternal Grandfather     Other (No hyperlipidemia) Other     Cancer Self 46        non hodgkins lymphoma       Food Journal  Reviewed and Discussed:       Patient  has a Food Journal?: yes   Patient is reading nutrition labels?  yes  Average Caloric Intake:     Average CHO Intake: 110  Is patient exercising? yes  Type of exercise? walking    Eating Habits  Patient states the following:  Eats 2 meal(s) per day  Length of time it takes to consume a meal:  20  # of snacks per day: 1 Type of snacks:    Amount of soda consumption per day:    Amount of water (in ounces) per day:  48  Drinking between meals only:  yes  Toughest challenge:      Nutritional Goals  Limit carbohydrates to 100 gms per day, Eat 100-200 calories within 1 hour of waking , and Eat 3-4 cups of fresh fruits or vegetables daily    Behavior Modifications Reviewed and Discussed  Eat breakfast, Eat 3 meals per day, Plan meals in advance, Read nutrition labels, Drink 64 oz of water per day, Maintain a daily food journal, No drinking 30 minutes before or after meals, Utlize portion control strategies to reduce calorie intake, Identify triggers for eating and manage cues, and Eat slowly and take 20 to 30 minutes to complete each meal    Exercise Goals Reviewed and Discussed        ROS:    Constitutional: positive for fatigue  Respiratory: positive for dyspnea on exertion  Cardiovascular: negative  Gastrointestinal: negative  Musculoskeletal:negative  Neurological: positive for headaches  Behavioral/Psych: positive for stress  Endocrine: negative  All other systems were reviewed and are negative    Physical Exam:   Limited due to tele visit  Awake and alert  Speaking full sentences      ASSESSMENT       Encounter Diagnosis(ses):   Encounter Diagnoses   Name Primary?    Type 2 diabetes mellitus without complication, without long-term current use of insulin (HCC) Yes    Stress     Hypertriglyceridemia     Encounter for therapeutic drug monitoring     Obesity (BMI 30-39.9)        PLAN     Patient is not interested in bariatric surgery. Patient desires to pursue traditional weight loss at this time.      DM2: newly  diagnosed  Will start Ozempic  Continue with carb controlled diet    DYSLIPIDEMIA: Stable on the above prescribed meal plan  Liver function stable.    Lab Results   Component Value Date/Time    CHOLEST 215 (H) 11/22/2023 09:32 AM     (H) 11/22/2023 09:32 AM    HDL 44 11/22/2023 09:32 AM    TRIG 175 (H) 11/22/2023 09:32 AM    VLDL 33 (H) 11/22/2023 09:32 AM       Goals for next month:  1. Keep a food log.  2. Drink 48-64 ounces of non-caloric beverages per day. No fruit juices or regular soda.  3. Increase activity-upper body exercises, walk 10 minutes per day.  4. Increase fruit and vegetable servings to 5-6 per day.      Tolerated Phentermine  Increased rectal pain noted  Will hold Phentermine for the month of Feb    tolerating Ozempic  Increase to 0.25 mg on Tuesday and 0.25  on Friday    Recent colonoscopy done  Hemorrhoids noted on colonscopy      Diagnoses and all orders for this visit:    Type 2 diabetes mellitus without complication, without long-term current use of insulin (HCC)    Stress    Hypertriglyceridemia    Encounter for therapeutic drug monitoring    Obesity (BMI 30-39.9)          Jt Tejeda MD

## 2024-02-01 NOTE — PATIENT INSTRUCTIONS
Hold Phentermine for the month of Feb    Take Ozempic 0.25 mg on Tuesday and 0.25  mg on Friday    Message me in March after a month of being off Phentermine on how your discomfort is.

## 2024-02-02 NOTE — TELEPHONE ENCOUNTER
Ivone Aly, RN 2/2/2024 9:45 AM CST        ----- Message -----  From: Azul Salazar  Sent: 2/1/2024 2:39 PM CST  To: Em Triage Support  Subject: Follow up     So after trying the hemorrhoid cream/with lidocaine and suppositories. I've had no relief! Is what I'm experiencing for sure a hemorrhoid? Nothing was discussed or mentioned in colonoscopy report, that I read. The doctor hasn't even reached out to go over results.  I'm at my wits end with this discomfort :(

## 2024-02-02 NOTE — TELEPHONE ENCOUNTER
CLIFFORD Sandhu.- Please advise on patient's message. Thank you    SOLA: patient read [email or web address] results   ALSO: DX hemorrhoids are on her AVS from the colonoscopy      Dear Azul,     I reviewed the pathology report from the polyp(s) completely removed during your recent colonoscopy. The polyp(s) removed was/were an adenoma, which is a benign growth. However, these are the types of polyps that if not removed could become a colon cancer. All of your polyps were completely removed.     The current health care guidelines recommend that patients with the size and number of your adenoma type polyp(s) should repeat their colonoscopy in 7 years, for you that is in 1/2031, to look for any new polyps that might have grown.     If you have further questions please call me at [phone].     Sincerely,     Barrie Kaufman MD   Written by Barrie Kaufman MD on 1/8/2024  3:24 PM CST  Seen by patient Azul PepitoMartha Salazar on 1/8/2024  3:28 PM

## 2024-02-06 RX ORDER — PEN NEEDLE, DIABETIC 32GX 5/32"
NEEDLE, DISPOSABLE MISCELLANEOUS
Qty: 30 EACH | Refills: 3 | Status: SHIPPED | OUTPATIENT
Start: 2024-02-06

## 2024-02-21 ENCOUNTER — LAB ENCOUNTER (OUTPATIENT)
Dept: LAB | Age: 49
End: 2024-02-21
Attending: INTERNAL MEDICINE
Payer: COMMERCIAL

## 2024-02-21 DIAGNOSIS — E78.00 PURE HYPERCHOLESTEROLEMIA: ICD-10-CM

## 2024-02-21 DIAGNOSIS — E78.1 HYPERTRIGLYCERIDEMIA: ICD-10-CM

## 2024-02-21 LAB
ALBUMIN SERPL-MCNC: 4.5 G/DL (ref 3.2–4.8)
ALP LIVER SERPL-CCNC: 62 U/L
ALT SERPL-CCNC: 59 U/L
AST SERPL-CCNC: 39 U/L (ref ?–34)
BILIRUB DIRECT SERPL-MCNC: 0.4 MG/DL (ref ?–0.3)
BILIRUB SERPL-MCNC: 1.2 MG/DL (ref 0.3–1.2)
CHOLEST SERPL-MCNC: 151 MG/DL (ref ?–200)
FASTING PATIENT LIPID ANSWER: YES
HDLC SERPL-MCNC: 48 MG/DL (ref 40–59)
LDLC SERPL CALC-MCNC: 82 MG/DL (ref ?–100)
NONHDLC SERPL-MCNC: 103 MG/DL (ref ?–130)
PROT SERPL-MCNC: 7.1 G/DL (ref 5.7–8.2)
TRIGL SERPL-MCNC: 115 MG/DL (ref 30–149)
VLDLC SERPL CALC-MCNC: 18 MG/DL (ref 0–30)

## 2024-02-21 PROCEDURE — 80061 LIPID PANEL: CPT

## 2024-02-21 PROCEDURE — 80076 HEPATIC FUNCTION PANEL: CPT

## 2024-02-21 PROCEDURE — 36415 COLL VENOUS BLD VENIPUNCTURE: CPT

## 2024-02-23 ENCOUNTER — OFFICE VISIT (OUTPATIENT)
Dept: INTERNAL MEDICINE CLINIC | Facility: CLINIC | Age: 49
End: 2024-02-23

## 2024-02-23 VITALS
SYSTOLIC BLOOD PRESSURE: 123 MMHG | DIASTOLIC BLOOD PRESSURE: 89 MMHG | HEIGHT: 64 IN | WEIGHT: 205 LBS | BODY MASS INDEX: 35 KG/M2 | OXYGEN SATURATION: 94 % | HEART RATE: 83 BPM

## 2024-02-23 DIAGNOSIS — E78.00 PURE HYPERCHOLESTEROLEMIA: Primary | ICD-10-CM

## 2024-02-23 DIAGNOSIS — E11.9 TYPE 2 DIABETES MELLITUS WITHOUT COMPLICATION, WITHOUT LONG-TERM CURRENT USE OF INSULIN (HCC): ICD-10-CM

## 2024-02-23 PROCEDURE — 3079F DIAST BP 80-89 MM HG: CPT | Performed by: INTERNAL MEDICINE

## 2024-02-23 PROCEDURE — 3074F SYST BP LT 130 MM HG: CPT | Performed by: INTERNAL MEDICINE

## 2024-02-23 PROCEDURE — 3008F BODY MASS INDEX DOCD: CPT | Performed by: INTERNAL MEDICINE

## 2024-02-23 PROCEDURE — 99214 OFFICE O/P EST MOD 30 MIN: CPT | Performed by: INTERNAL MEDICINE

## 2024-02-23 NOTE — PROGRESS NOTES
Patient ID: Azul Joseph is a 48 year old female.  Chief Complaint   Patient presents with    Follow - Up     Follow up for blood work from         HISTORY OF PRESENT ILLNESS:   HPI  Patient presents for above.  Here for follow-up.    History of hypercholesterolemia on rosuvastatin.  Had labs done prior to visit which showed significant improvement in her panel.  She is also intentionally lost 16 pounds via diet and exercise.  No side effects of medication.    History of type 2 diabetes with most recent A1c being 6.3.  Currently taking Ozempic.  Weight loss as above.    Review of Systems   Constitutional: Negative.    HENT: Negative.     Eyes: Negative.    Respiratory: Negative.     Cardiovascular: Negative.    Endocrine: Negative.    Genitourinary: Negative.    Musculoskeletal: Negative.    Allergic/Immunologic: Negative.    Neurological: Negative.    Hematological: Negative.      MEDICAL HISTORY:     Past Medical History:   Diagnosis Date    Allergic rhinitis 5 years ago    Anxiety 3 years ago    occasional    Artificial insemination     Back problem     DDD    Cancer (HCC)     non hodgkin's lymphoma    Cholecystitis     Laser cholecystectomy     Decorative tattoo     Degenerative lumbar disc     Degenerative L2 disc, Therapy    H/O pregnancy , , 2003, 2009    EAB x 2-, ;  X 3- , ?, 2009    Helicobacter positive gastritis 2021    Noted on EGD; no ulcer. On abx    High cholesterol     Hormone disorder     Hyperlipidemia     Infertility     Art insemination    Lipid screening 03/10/2014    per NG    Obesity (BMI 30-39.9)     Obesity, unspecified     Polycystic ovaries     Artificial insemination/clomid    Screen for colon cancer 2019    no adenomatous polyp; repeat CLN in 10 yrs    Tonsillitis     Tonsillectomy    Type 2 diabetes mellitus without complication, without long-term current use of insulin (HCC) 2023       Past Surgical History:    Procedure Laterality Date           X 3, , ?, 2009       &     CHOLECYSTECTOMY      laser cholecystectomy    COLONOSCOPY  2019    COLONOSCOPY N/A 2024    Procedure: COLONOSCOPY;  Surgeon: Barrie Kaufman MD;  Location: [facility] ENDOSCOPY    SKIN SURGERY      #2 moles removed on back    TONSILLECTOMY           Current Outpatient Medications:     Insulin Pen Needle (BD PEN NEEDLE ANGÉLICA U/F) 32G X 4 MM Does not apply Misc, Use a new needle with each use, Disp: 30 each, Rfl: 3    semaglutide (OZEMPIC, 0.25 OR 0.5 MG/DOSE,) 2 MG/3ML Subcutaneous Solution Pen-injector, Inject 0.5 mg into the skin once a week., Disp: 3 mL, Rfl: 3    Omeprazole 40 MG Oral Capsule Delayed Release, Take 1 capsule (40 mg total) by mouth daily. Take 1 tablet every other day, Disp: , Rfl:     hydrocortisone 2.5 % External Cream, Place 1 Application rectally 2 (two) times daily., Disp: 28 g, Rfl: 0    rosuvastatin 20 MG Oral Tab, Take 1 tablet (20 mg total) by mouth nightly., Disp: 90 tablet, Rfl: 0    Phentermine HCl 15 MG Oral Cap, Take 1 capsule (15 mg total) by mouth every morning. (Patient not taking: Reported on 2024), Disp: 30 capsule, Rfl: 2    Allergies:  Allergies   Allergen Reactions    Latex RASH and ITCHING     redness       Social History     Socioeconomic History    Marital status:      Spouse name: Not on file    Number of children: Not on file    Years of education: Not on file    Highest education level: Not on file   Occupational History    Not on file   Tobacco Use    Smoking status: Former     Packs/day: 0     Types: Cigarettes     Quit date: 1993     Years since quittin.9    Smokeless tobacco: Never   Vaping Use    Vaping Use: Never used   Substance and Sexual Activity    Alcohol use: Yes     Alcohol/week: 1.0 standard drink of alcohol     Types: 1 Standard drinks or equivalent per week     Comment: very occasional    Drug use: No    Sexual activity: Yes      Partners: Male     Birth control/protection: Vasectomy   Other Topics Concern     Service Not Asked    Blood Transfusions Not Asked    Caffeine Concern Yes     Comment: Coffee, 1 cup daily    Occupational Exposure Not Asked    Hobby Hazards Not Asked    Sleep Concern Not Asked    Stress Concern Not Asked    Weight Concern Not Asked    Special Diet Not Asked    Back Care Not Asked    Exercise Yes     Comment: walking daily    Bike Helmet Not Asked    Seat Belt Not Asked    Self-Exams Not Asked    Grew up on a farm Not Asked    History of tanning Yes    Outdoor occupation Not Asked    Pt has a pacemaker Not Asked    Pt has a defibrillator Not Asked    Breast feeding No    Reaction to local anesthetic No   Social History Narrative    Not on file     Social Determinants of Health     Financial Resource Strain: Not on file   Food Insecurity: Not on file   Transportation Needs: Not on file   Physical Activity: Not on file   Stress: Not on file   Social Connections: Not on file   Housing Stability: Not on file           PHYSICAL EXAM:     Vitals:    02/23/24 1112   BP: 123/89   Pulse: 83   SpO2: 94%   Weight: 205 lb (93 kg)   Height: 5' 4\" (1.626 m)       Body mass index is 35.19 kg/m².    Physical Exam  Constitutional:       Appearance: Normal appearance.   Eyes:      General: No scleral icterus.  Cardiovascular:      Rate and Rhythm: Normal rate and regular rhythm.      Pulses: Normal pulses.      Heart sounds: Normal heart sounds. No murmur heard.  Pulmonary:      Effort: Pulmonary effort is normal. No respiratory distress.      Breath sounds: Normal breath sounds. No stridor. No wheezing or rhonchi.   Abdominal:      General: Abdomen is flat. Bowel sounds are normal.      Palpations: Abdomen is soft.   Neurological:      Mental Status: She is alert.   Psychiatric:         Mood and Affect: Mood normal.         Behavior: Behavior normal.       Lab Results   Component Value Date    CHOLEST 151 02/21/2024    TRIG  115 02/21/2024    HDL 48 02/21/2024    LDL 82 02/21/2024    VLDL 18 02/21/2024    NONHDLC 103 02/21/2024    CALCNONHDL 268 (H) 04/04/2016           ASSESSMENT/PLAN:   1. Pure hypercholesterolemia  Significant improvement in lipid panel.  Continue rosuvastatin.  If weight loss continues then may be able to down titrate medication and possibly stop altogether.    2. Type 2 diabetes mellitus without complication, without long-term current use of insulin (HCC)  Well-controlled.  Her optometrist is at [facility] and will get an eye exam done through them and have results sent to this office.    Return in about 7 months (around 10/1/2024) for Complete physical.    This note was prepared using Dragon Medical voice recognition dictation software. As a result errors may occur. When identified these errors have been corrected. While every attempt is made to correct errors during dictation discrepancies may still exist.    Weston Jordan MD  2/23/2024

## 2024-02-26 ENCOUNTER — HOSPITAL ENCOUNTER (OUTPATIENT)
Dept: CT IMAGING | Facility: HOSPITAL | Age: 49
Discharge: HOME | End: 2024-02-26
Attending: INTERNAL MEDICINE
Payer: COMMERCIAL

## 2024-02-26 DIAGNOSIS — Z85.72 ENCOUNTER FOR FOLLOW-UP SURVEILLANCE OF LYMPHOMA: ICD-10-CM

## 2024-02-26 DIAGNOSIS — Z08 ENCOUNTER FOR FOLLOW-UP SURVEILLANCE OF LYMPHOMA: ICD-10-CM

## 2024-02-26 DIAGNOSIS — C82.13 FOLLICULAR LYMPHOMA GRADE II OF INTRA-ABDOMINAL LYMPH NODES (HCC): ICD-10-CM

## 2024-02-26 LAB
CREAT BLD-MCNC: 0.8 MG/DL
EGFRCR SERPLBLD CKD-EPI 2021: 90 ML/MIN/1.73M2 (ref 60–?)

## 2024-02-26 PROCEDURE — 82565 ASSAY OF CREATININE: CPT

## 2024-02-26 PROCEDURE — 71260 CT THORAX DX C+: CPT | Performed by: INTERNAL MEDICINE

## 2024-02-26 PROCEDURE — 74177 CT ABD & PELVIS W/CONTRAST: CPT | Performed by: INTERNAL MEDICINE

## 2024-02-26 RX ORDER — OMEPRAZOLE 40 MG/1
40 CAPSULE, DELAYED RELEASE ORAL
Qty: 90 CAPSULE | Refills: 1 | Status: SHIPPED | OUTPATIENT
Start: 2024-02-26

## 2024-02-26 NOTE — TELEPHONE ENCOUNTER
Requested Prescriptions     Pending Prescriptions Disp Refills    OMEPRAZOLE 40 MG Oral Capsule Delayed Release [Pharmacy Med Name: OMEPRAZOLE DR 40 MG CAPSULE] 90 capsule 1     Sig: TAKE 1 CAPSULE (40 MG TOTAL) BY MOUTH DAILY. TAKE 1 CAPSULE BY MOUTH DAILY BEFORE BREAKFAST.        LOV    8/22/2023    LR   10/3/2023      sb

## 2024-03-04 ENCOUNTER — NURSE ONLY (OUTPATIENT)
Dept: HEMATOLOGY/ONCOLOGY | Facility: HOSPITAL | Age: 49
End: 2024-03-04
Attending: INTERNAL MEDICINE
Payer: COMMERCIAL

## 2024-03-04 VITALS
HEIGHT: 63 IN | WEIGHT: 205.63 LBS | HEART RATE: 100 BPM | RESPIRATION RATE: 18 BRPM | BODY MASS INDEX: 36.43 KG/M2 | OXYGEN SATURATION: 97 % | TEMPERATURE: 98 F | DIASTOLIC BLOOD PRESSURE: 57 MMHG | SYSTOLIC BLOOD PRESSURE: 130 MMHG

## 2024-03-04 DIAGNOSIS — Z08 ENCOUNTER FOR FOLLOW-UP EXAMINATION AFTER COMPLETED TREATMENT FOR MALIGNANT NEOPLASM: ICD-10-CM

## 2024-03-04 DIAGNOSIS — Z08 ENCOUNTER FOR FOLLOW-UP SURVEILLANCE OF LYMPHOMA: ICD-10-CM

## 2024-03-04 DIAGNOSIS — Z85.72 ENCOUNTER FOR FOLLOW-UP SURVEILLANCE OF LYMPHOMA: ICD-10-CM

## 2024-03-04 DIAGNOSIS — Z45.2 ENCOUNTER FOR CENTRAL LINE CARE: Primary | ICD-10-CM

## 2024-03-04 DIAGNOSIS — C82.13 FOLLICULAR LYMPHOMA GRADE II OF INTRA-ABDOMINAL LYMPH NODES (HCC): Primary | ICD-10-CM

## 2024-03-04 LAB
ALBUMIN SERPL-MCNC: 4.4 G/DL (ref 3.2–4.8)
ALBUMIN/GLOB SERPL: 1.8 {RATIO} (ref 1–2)
ALP LIVER SERPL-CCNC: 65 U/L
ALT SERPL-CCNC: 53 U/L
ANION GAP SERPL CALC-SCNC: 5 MMOL/L (ref 0–18)
AST SERPL-CCNC: 32 U/L (ref ?–34)
BASOPHILS # BLD AUTO: 0.03 X10(3) UL (ref 0–0.2)
BASOPHILS NFR BLD AUTO: 0.5 %
BILIRUB SERPL-MCNC: 1.1 MG/DL (ref 0.3–1.2)
BUN BLD-MCNC: 9 MG/DL (ref 9–23)
BUN/CREAT SERPL: 12.5 (ref 10–20)
CALCIUM BLD-MCNC: 8.9 MG/DL (ref 8.7–10.4)
CHLORIDE SERPL-SCNC: 111 MMOL/L (ref 98–112)
CO2 SERPL-SCNC: 25 MMOL/L (ref 21–32)
CREAT BLD-MCNC: 0.72 MG/DL
DEPRECATED RDW RBC AUTO: 40.1 FL (ref 35.1–46.3)
EGFRCR SERPLBLD CKD-EPI 2021: 102 ML/MIN/1.73M2 (ref 60–?)
EOSINOPHIL # BLD AUTO: 0.13 X10(3) UL (ref 0–0.7)
EOSINOPHIL NFR BLD AUTO: 2.3 %
ERYTHROCYTE [DISTWIDTH] IN BLOOD BY AUTOMATED COUNT: 13 % (ref 11–15)
GLOBULIN PLAS-MCNC: 2.5 G/DL (ref 2.8–4.4)
GLUCOSE BLD-MCNC: 93 MG/DL (ref 70–99)
HCT VFR BLD AUTO: 42 %
HGB BLD-MCNC: 13.9 G/DL
IMM GRANULOCYTES # BLD AUTO: 0.01 X10(3) UL (ref 0–1)
IMM GRANULOCYTES NFR BLD: 0.2 %
LDH SERPL L TO P-CCNC: 214 U/L
LYMPHOCYTES # BLD AUTO: 1.88 X10(3) UL (ref 1–4)
LYMPHOCYTES NFR BLD AUTO: 33 %
MCH RBC QN AUTO: 28.1 PG (ref 26–34)
MCHC RBC AUTO-ENTMCNC: 33.1 G/DL (ref 31–37)
MCV RBC AUTO: 84.8 FL
MONOCYTES # BLD AUTO: 0.58 X10(3) UL (ref 0.1–1)
MONOCYTES NFR BLD AUTO: 10.2 %
NEUTROPHILS # BLD AUTO: 3.07 X10 (3) UL (ref 1.5–7.7)
NEUTROPHILS # BLD AUTO: 3.07 X10(3) UL (ref 1.5–7.7)
NEUTROPHILS NFR BLD AUTO: 53.8 %
OSMOLALITY SERPL CALC.SUM OF ELEC: 290 MOSM/KG (ref 275–295)
PLATELET # BLD AUTO: 283 10(3)UL (ref 150–450)
POTASSIUM SERPL-SCNC: 3.9 MMOL/L (ref 3.5–5.1)
PROT SERPL-MCNC: 6.9 G/DL (ref 5.7–8.2)
RBC # BLD AUTO: 4.95 X10(6)UL
SODIUM SERPL-SCNC: 141 MMOL/L (ref 136–145)
WBC # BLD AUTO: 5.7 X10(3) UL (ref 4–11)

## 2024-03-04 PROCEDURE — 36591 DRAW BLOOD OFF VENOUS DEVICE: CPT

## 2024-03-04 PROCEDURE — 85025 COMPLETE CBC W/AUTO DIFF WBC: CPT

## 2024-03-04 PROCEDURE — 80053 COMPREHEN METABOLIC PANEL: CPT

## 2024-03-04 PROCEDURE — 99213 OFFICE O/P EST LOW 20 MIN: CPT | Performed by: INTERNAL MEDICINE

## 2024-03-04 PROCEDURE — 83615 LACTATE (LD) (LDH) ENZYME: CPT

## 2024-03-04 RX ORDER — SODIUM CHLORIDE 9 MG/ML
10 INJECTION, SOLUTION INTRAMUSCULAR; INTRAVENOUS; SUBCUTANEOUS ONCE
OUTPATIENT
Start: 2024-03-04

## 2024-03-04 RX ORDER — HEPARIN SODIUM (PORCINE) LOCK FLUSH IV SOLN 100 UNIT/ML 100 UNIT/ML
5 SOLUTION INTRAVENOUS ONCE
Status: COMPLETED | OUTPATIENT
Start: 2024-03-04 | End: 2024-03-04

## 2024-03-04 RX ORDER — HEPARIN SODIUM (PORCINE) LOCK FLUSH IV SOLN 100 UNIT/ML 100 UNIT/ML
5 SOLUTION INTRAVENOUS ONCE
OUTPATIENT
Start: 2024-03-04

## 2024-03-04 RX ADMIN — HEPARIN SODIUM (PORCINE) LOCK FLUSH IV SOLN 100 UNIT/ML 500 UNITS: 100 SOLUTION INTRAVENOUS at 12:36:00

## 2024-03-04 NOTE — PROGRESS NOTES
HPI     Azul Joseph is a 48 year old female here for follow up of   Encounter Diagnoses   Name Primary?    Follicular lymphoma grade II of intra-abdominal lymph nodes (HCC) Yes    Encounter for follow-up examination after completed treatment for malignant neoplasm        She s/p of cycle 6 of BR in August of 2022, and posttreatment imaging was consistent with a complete metabolic response.    She is here for surveillance visit.    Watching diet.  Lost weight with ozempic with weight loss clinic.    Denies any fevers, NS or involuntary weight loss.  No palpable LNs.   Pain at R collar bone at times but improved.  Port still in place but states not tender.      LMP 11/01/23.    ECOG PS 0      Review of Systems:     Review of Systems   Constitutional:  Negative for appetite change, diaphoresis, fatigue, fever and unexpected weight change.   Respiratory:  Negative for cough and shortness of breath.    Cardiovascular:  Negative for chest pain.   Gastrointestinal:  Negative for abdominal pain.   Endocrine: Negative for hot flashes.   Musculoskeletal:  Negative for arthralgias and back pain.        Bone pains.   Neurological:  Negative for dizziness and headaches (occasionally).   Hematological:  Negative for adenopathy.   Psychiatric/Behavioral:  Negative for sleep disturbance (improved).             Current Outpatient Medications   Medication Sig Dispense Refill    Omeprazole 40 MG Oral Capsule Delayed Release Take 1 capsule (40 mg total) by mouth before breakfast. 90 capsule 1    Insulin Pen Needle (BD PEN NEEDLE ANGÉLICA U/F) 32G X 4 MM Does not apply Misc Use a new needle with each use 30 each 3    semaglutide (OZEMPIC, 0.25 OR 0.5 MG/DOSE,) 2 MG/3ML Subcutaneous Solution Pen-injector Inject 0.5 mg into the skin once a week. 3 mL 3    hydrocortisone 2.5 % External Cream Place 1 Application rectally 2 (two) times daily. 28 g 0    rosuvastatin 20 MG Oral Tab Take 1 tablet (20 mg total) by mouth nightly. 90  tablet 0    Phentermine HCl 15 MG Oral Cap Take 1 capsule (15 mg total) by mouth every morning. (Patient not taking: Reported on 2024) 30 capsule 2     Allergies:   Allergies   Allergen Reactions    Latex RASH and ITCHING     redness       Past Medical History:   Diagnosis Date    Allergic rhinitis 5 years ago    Anxiety 3 years ago    occasional    Artificial insemination     Back problem     DDD    Cancer (HCC)     non hodgkin's lymphoma    Cholecystitis     Laser cholecystectomy     Decorative tattoo     Degenerative lumbar disc 2005    Degenerative L2 disc, Therapy    H/O pregnancy , , , 2009    EAB x 2-, ;  X 3- , ?, 2009    Helicobacter positive gastritis 2021    Noted on EGD; no ulcer. On abx    High cholesterol     Hormone disorder     Hyperlipidemia     Infertility     Art insemination    Lipid screening 03/10/2014    per NG    Obesity (BMI 30-39.9)     Obesity, unspecified     Polycystic ovaries     Artificial insemination/clomid    Screen for colon cancer 2019    no adenomatous polyp; repeat CLN in 10 yrs    Tonsillitis     Tonsillectomy    Type 2 diabetes mellitus without complication, without long-term current use of insulin ([facility]) 2023     Past Surgical History:   Procedure Laterality Date           X 3, , ?, 2009       &     CHOLECYSTECTOMY      laser cholecystectomy    COLONOSCOPY      COLONOSCOPY N/A 2024    Procedure: COLONOSCOPY;  Surgeon: Barrie Kaufman MD;  Location: [facility] ENDOSCOPY    SKIN SURGERY      #2 moles removed on back    TONSILLECTOMY       Social History     Socioeconomic History    Marital status:    Tobacco Use    Smoking status: Former     Packs/day: 0     Types: Cigarettes     Quit date: 1993     Years since quittin.9    Smokeless tobacco: Never   Vaping Use    Vaping Use: Never used   Substance and Sexual Activity    Alcohol use: Yes     Alcohol/week: 1.0  standard drink of alcohol     Types: 1 Standard drinks or equivalent per week     Comment: very occasional    Drug use: No    Sexual activity: Yes     Partners: Male     Birth control/protection: Vasectomy   Other Topics Concern    Caffeine Concern Yes     Comment: Coffee, 1 cup daily    Exercise Yes     Comment: walking daily    History of tanning Yes    Breast feeding No    Reaction to local anesthetic No       Family History   Problem Relation Age of Onset    Diabetes Father 65    Obesity Father     Hypertension Mother     High Cholesterol Mother     Diabetes Mother         Bord DM    Cancer Mother 55        Breast Cancer    Heart Disorder Mother         coronary stent    Breast Cancer Mother 55    Heart Attack Mother     Obesity Mother     Alcohol and Other Disorders Associated Maternal Grandmother         alcoholism    Cancer Maternal Grandmother         Cancer-lung, age 42 (cause of death)    Diabetes Maternal Grandfather     Other (No hyperlipidemia) Other     Cancer Self 46        non hodgkins lymphoma         PHYSICAL EXAM:    /57 (Patient Position: Sitting, Cuff Size: adult)   Pulse 100   Temp 98.4 °F (36.9 °C) (Oral)   Resp 18   Ht 1.6 m (5' 3\")   Wt 93.3 kg (205 lb 9.6 oz)   LMP 11/01/2023 (Approximate)   SpO2 97%   BMI 36.42 kg/m²   Wt Readings from Last 6 Encounters:   03/04/24 93.3 kg (205 lb 9.6 oz)   02/23/24 93 kg (205 lb)   02/01/24 95.3 kg (210 lb)   01/22/24 96.6 kg (213 lb)   12/29/23 90.7 kg (200 lb)   12/11/23 97.5 kg (215 lb)     Physical Exam  General: Patient is alert, not in acute distress.  HEENT: EOMs intact. PERRL.   Neck: No JVD. No palpable lymphadenopathy. Neck is supple.  Chest: Clear to auscultation.  Heart: Regular rate and rhythm.   Abdomen: Soft, non tender with good bowel sounds.  Extremities: No edema.  Neurological: Grossly intact.   Lymphatics: There is no palpable lymphadenopathy throughout in the cervical, supraclavicular, axillary, or inguinal  regions.  Psych/Depression: nl  Skin rash improved      ASSESSMENT/PLAN:     1. 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    Discussed with the patient that there is discordance in the pathology report and the level of activity on these keisha basins seen on the PET/CT.  Discussed that the level of activity on PET/CT is more suggestive of a high-grade lymphoma.    Discussed that the therapies for low-grade and high-grade lymphoma are markedly different, as well as he prognosis is different for these 2.    Discussed that in order to determine if this is a high-grade lymphoma, recommend an excisional biopsy.  This would be very difficult to diagnose with another image guided biopsy.  Discussed that there was an area of necrosis seen on the image guided core biopsy, which is feature usually associated with high-grade lymphoma.    She was referred to Dr. Mendoza from surgical oncology for a robotic guided biopsy of the mesenteric lymph nodes.  This was performed on 1/10/2022.    Discussed with patient that fortunately this is still consistent with a grade 1-2 follicular lymphoma.    Discussed with the patient that given diagnoses been confirmed as a low-grade follicular lymphoma, we will proceed with treatment with Bendamustine and rituximab.      Her FLIPI score is 0.  Discussed with the patient that this low risk group with the addition of rituximab to treatment plan, had a 2-year overall survival of 98%, 2-year progression free survival 84% and a median progression free  survival of 84 months      S/p cycle 6 of BR in August 2022.    Patient with evidence of complete response on PET/CT prior [place] criteria.    Discussed on maintenance therapy with Rituxan for 2 years.  However, on updated NCCN guideline 4.2022 the state that maintenance therapy is suggested. Pt considered and declinined maintenance, based on data discussed and guidelines.     On surveillance as per NCCN guidelines with CBC, CMP and LDH every 3 months for 2 yrs and CT scan every 6 months for 2 yrs (from August, 2022 to August, 2024).      Yrs 3-5 (until August of 2027) labs q 6 mo, then yearly.  H+P each visit.    CT in February 2024 AMANDA.  Next CT in August 2024, this will complete her imaging and then imaging will be as needed for symptom.      No follow-ups on file.      MDM low    No orders of the defined types were placed in this encounter.      Results From Past 48 Hours:  Recent Results (from the past 48 hour(s))   COMP METABOLIC PANEL [E]    Collection Time: 03/04/24 12:33 PM   Result Value Ref Range    Glucose 93 70 - 99 mg/dL    Sodium 141 136 - 145 mmol/L    Potassium 3.9 3.5 - 5.1 mmol/L    Chloride 111 98 - 112 mmol/L    CO2 25.0 21.0 - 32.0 mmol/L    Anion Gap 5 0 - 18 mmol/L    BUN 9 9 - 23 mg/dL    Creatinine 0.72 0.55 - 1.02 mg/dL    BUN/CREA Ratio 12.5 10.0 - 20.0    Calcium, Total 8.9 8.7 - 10.4 mg/dL    Calculated Osmolality 290 275 - 295 mOsm/kg    eGFR-Cr 102 >=60 mL/min/1.73m2    ALT 53 (H) 10 - 49 U/L    AST 32 <=34 U/L    Alkaline Phosphatase 65 39 - 100 U/L    Bilirubin, Total 1.1 0.3 - 1.2 mg/dL    Total Protein 6.9 5.7 - 8.2 g/dL    Albumin 4.4 3.2 - 4.8 g/dL    Globulin  2.5 (L) 2.8 - 4.4 g/dL    A/G Ratio 1.8 1.0 - 2.0    Patient Fasting for CMP? Patient not present    LDH [E]    Collection Time: 03/04/24 12:33 PM   Result Value Ref Range     120 - 246 U/L   CBC W/ DIFFERENTIAL    Collection Time: 03/04/24 12:33 PM   Result Value Ref Range    WBC 5.7 4.0 - 11.0 x10(3) uL    RBC  4.95 3.80 - 5.30 x10(6)uL    HGB 13.9 12.0 - 16.0 g/dL    HCT 42.0 35.0 - 48.0 %    MCV 84.8 80.0 - 100.0 fL    MCH 28.1 26.0 - 34.0 pg    MCHC 33.1 31.0 - 37.0 g/dL    RDW-SD 40.1 35.1 - 46.3 fL    RDW 13.0 11.0 - 15.0 %    .0 150.0 - 450.0 10(3)uL    Neutrophil Absolute Prelim 3.07 1.50 - 7.70 x10 (3) uL    Neutrophil Absolute 3.07 1.50 - 7.70 x10(3) uL    Lymphocyte Absolute 1.88 1.00 - 4.00 x10(3) uL    Monocyte Absolute 0.58 0.10 - 1.00 x10(3) uL    Eosinophil Absolute 0.13 0.00 - 0.70 x10(3) uL    Basophil Absolute 0.03 0.00 - 0.20 x10(3) uL    Immature Granulocyte Absolute 0.01 0.00 - 1.00 x10(3) uL    Neutrophil % 53.8 %    Lymphocyte % 33.0 %    Monocyte % 10.2 %    Eosinophil % 2.3 %    Basophil % 0.5 %    Immature Granulocyte % 0.2 %       Imaging & Referrals:  None     CT CHEST+ABDOMEN+PELVIS(ALL CNTRST ONLY)(EKE=24072/64984) [[id number]] Collected: 02/26/24 1337   Order Status: Completed Updated: 02/26/24 1337   Narrative:     PROCEDURE: CT CHEST ABDOMEN PELVIS (ALL CONTRAST ONLY) (CPT=71260/94565)     COMPARISON: [facility], CT CHEST+ABDOMEN+PELVIS(ALL CNTRST ONLY)(CPT=71260/78799), 2/06/2023, 2:41 PM.  [facility], PET STANDARD BODY SCAN (CPT=78815), 7/25/2022, 9:01 AM.  [facility], CT ABDOMEN  PELVIS IV CONTRAST NO ORAL (ER), 12/10/2021, 3:58 PM.  [facility], CT CHEST+ABDOMEN+PELVIS(ALL CNTRST ONLY)(CPT=71260/26351), 8/10/2023, 9:20 AM.  [facility],  PELVIS W EV (CPT=76856/13784), 6/21/2023, 11:12 AM.     INDICATIONS: Encounter for follow-up surveillance of lymphoma.  History of follicular lymphoma grade II of intra-abdominal lymph nodes status post chemotherapy; restaging.  Additional history of a cholecystectomy.     TECHNIQUE: Multidetector CT images of the chest, abdomen and pelvis were obtained with intravenous contrast material. Automated exposure control for dose reduction was used.  Adjustment of the mA and/or kV was done based on the patient's size. Iterative  reconstruction technique for dose reduction was employed. Dose information was transmitted to the ACR (American College of Radiology) NRDR (National Radiology Data Registry), which includes the Dose Index Registry.       FINDINGS:  DEVICES: A right-sided subcutaneous chest port is again noted with tip again terminating at the cavoatrial junction.  CARDIAC: The heart is not enlarged.  There are no significant coronary artery calcifications.  There is no pericardial effusion.  VASCULATURE: The thoracic aorta has unremarkable configuration without aneurysm or dissection.    LUNGS/PLEURA: No airspace consolidation, pleural effusion, or pneumothorax is detected.    AIRWAYS: The tracheobronchial tree is without central mass or obstructing lesion.  MEDIASTINUM/EMILY: No mass or lymphadenopathy.    CHEST WALL: No axillary mass or lymphadenopathy.       LIVER: The liver is again mildly enlarged and demonstrates diffuse steatosis, grossly unchanged.  There are no discrete liver lesions.  BILIARY: The gallbladder is again noted to be surgically absent.  There is no biliary ductal dilatation.  PANCREAS: No lesion, fluid collection, ductal dilatation, or atrophy.    SPLEEN: No enlargement.    ADRENALS:   No defined mass or abnormal enlargement.    KIDNEYS:   Mild cortical scarring has developed in the medial aspect of the superior left renal pole.  The kidneys otherwise enhance symmetrically without hydronephrosis or suspicious lesions.  GI/MESENTERY: No bowel wall thickening or obstruction.  The appendix is normal.  URINARY BLADDER: No visible calculus or focal wall thickening.    PELVIC NODES: No lymphadenopathy.    PELVIC ORGANS: The uterus is present.  A 2.9 x 2.2 x 2.8 cm left adnexal cyst has decreased in size from 3.9 x 3.7 x 3.7 cm on 08/10/2023.  Multiple nabothian cysts are again seen in the cervix.  VASCULATURE:   No aneurysm is  detected.  RETROPERITONEUM: No mass or lymphadenopathy is apparent.    BONES:   No significant bony lesion or fracture.  There are 6 lumbar type vertebrae with redemonstration of transitional vertebral anatomy at the lumbosacral junction bilaterally.  Partial butterfly morphology of the T5 and T11 vertebral bodies is again  noted.  Mild spondylotic changes are again seen throughout the thoracolumbar spine.  ABDOMINAL WALL: There is stable transverse scarring in the anterior pelvic wall, likely related to a previous Caesarean section.  A minute fat containing umbilical hernia is unchanged.  OTHER: No free air or fluid is seen in the abdomen or pelvis.                 Impression:     CONCLUSION:  1. There is a history of follicular lymphoma with evidence of complete treatment response, similar to the 08/10/2023 exam.  2. Decrease in size of a left adnexal cyst, which is therefore most consistent with an involuting benign physiologic left ovarian cyst.  3. Stable mild hepatomegaly with redemonstration of diffuse hepatic steatosis.  4. Status post cholecystectomy.  No biliary ductal dilatation.  5. Lesser incidental findings as above.        [facility]     Dictated by (CST): Harish Bates MD on 2/26/2024 at 1:26 PM      Finalized by (CST): Harish Bates MD on 2/26/2024 at 1:36 PM

## 2024-03-08 ENCOUNTER — TELEPHONE (OUTPATIENT)
Dept: SURGERY | Facility: CLINIC | Age: 49
End: 2024-03-08

## 2024-03-08 RX ORDER — PEN NEEDLE, DIABETIC 32GX 5/32"
NEEDLE, DISPOSABLE MISCELLANEOUS
Qty: 30 EACH | Refills: 3 | Status: SHIPPED | OUTPATIENT
Start: 2024-03-08

## 2024-03-25 ENCOUNTER — NURSE ONLY (OUTPATIENT)
Dept: INTERNAL MEDICINE CLINIC | Facility: CLINIC | Age: 49
End: 2024-03-25

## 2024-03-25 DIAGNOSIS — Z23 NEED FOR VACCINATION: Primary | ICD-10-CM

## 2024-03-25 PROCEDURE — 90471 IMMUNIZATION ADMIN: CPT | Performed by: INTERNAL MEDICINE

## 2024-03-25 PROCEDURE — 90746 HEPB VACCINE 3 DOSE ADULT IM: CPT | Performed by: INTERNAL MEDICINE

## 2024-03-25 NOTE — PROGRESS NOTES
Pt presented for 3rd hep b vaccine, confirmed name and , confirmed reason for visit, pt received immunization on left deltoid, pt tolerated injection well

## 2024-06-04 RX ORDER — HEPARIN SODIUM (PORCINE) LOCK FLUSH IV SOLN 100 UNIT/ML 100 UNIT/ML
5 SOLUTION INTRAVENOUS ONCE
Status: CANCELLED | OUTPATIENT
Start: 2024-06-04

## 2024-06-04 RX ORDER — SODIUM CHLORIDE 9 MG/ML
10 INJECTION, SOLUTION INTRAMUSCULAR; INTRAVENOUS; SUBCUTANEOUS ONCE
OUTPATIENT
Start: 2024-06-04

## 2024-06-05 ENCOUNTER — NURSE ONLY (OUTPATIENT)
Dept: HEMATOLOGY/ONCOLOGY | Facility: HOSPITAL | Age: 49
End: 2024-06-05
Attending: INTERNAL MEDICINE
Payer: COMMERCIAL

## 2024-06-05 VITALS
HEART RATE: 70 BPM | RESPIRATION RATE: 18 BRPM | SYSTOLIC BLOOD PRESSURE: 146 MMHG | OXYGEN SATURATION: 100 % | DIASTOLIC BLOOD PRESSURE: 91 MMHG | HEIGHT: 63 IN | BODY MASS INDEX: 34.73 KG/M2 | WEIGHT: 196 LBS | TEMPERATURE: 98 F

## 2024-06-05 DIAGNOSIS — Z08 ENCOUNTER FOR FOLLOW-UP SURVEILLANCE OF LYMPHOMA: ICD-10-CM

## 2024-06-05 DIAGNOSIS — Z85.72 ENCOUNTER FOR FOLLOW-UP SURVEILLANCE OF LYMPHOMA: ICD-10-CM

## 2024-06-05 DIAGNOSIS — C82.13 FOLLICULAR LYMPHOMA GRADE II OF INTRA-ABDOMINAL LYMPH NODES (HCC): Primary | ICD-10-CM

## 2024-06-05 DIAGNOSIS — Z45.2 ENCOUNTER FOR CENTRAL LINE CARE: Primary | ICD-10-CM

## 2024-06-05 DIAGNOSIS — Z08 ENCOUNTER FOR FOLLOW-UP EXAMINATION AFTER COMPLETED TREATMENT FOR MALIGNANT NEOPLASM: ICD-10-CM

## 2024-06-05 LAB
ALBUMIN SERPL-MCNC: 4.5 G/DL (ref 3.2–4.8)
ALBUMIN/GLOB SERPL: 1.7 {RATIO} (ref 1–2)
ALP LIVER SERPL-CCNC: 63 U/L
ALT SERPL-CCNC: 31 U/L
ANION GAP SERPL CALC-SCNC: 5 MMOL/L (ref 0–18)
AST SERPL-CCNC: 21 U/L (ref ?–34)
BASOPHILS # BLD AUTO: 0.03 X10(3) UL (ref 0–0.2)
BASOPHILS NFR BLD AUTO: 0.6 %
BILIRUB SERPL-MCNC: 1 MG/DL (ref 0.3–1.2)
BUN BLD-MCNC: 11 MG/DL (ref 9–23)
BUN/CREAT SERPL: 14.3 (ref 10–20)
CALCIUM BLD-MCNC: 8.9 MG/DL (ref 8.7–10.4)
CHLORIDE SERPL-SCNC: 111 MMOL/L (ref 98–112)
CO2 SERPL-SCNC: 26 MMOL/L (ref 21–32)
CREAT BLD-MCNC: 0.77 MG/DL
DEPRECATED RDW RBC AUTO: 38.7 FL (ref 35.1–46.3)
EGFRCR SERPLBLD CKD-EPI 2021: 95 ML/MIN/1.73M2 (ref 60–?)
EOSINOPHIL # BLD AUTO: 0.11 X10(3) UL (ref 0–0.7)
EOSINOPHIL NFR BLD AUTO: 2.2 %
ERYTHROCYTE [DISTWIDTH] IN BLOOD BY AUTOMATED COUNT: 12.6 % (ref 11–15)
GLOBULIN PLAS-MCNC: 2.7 G/DL (ref 2–3.5)
GLUCOSE BLD-MCNC: 99 MG/DL (ref 70–99)
HCT VFR BLD AUTO: 42.8 %
HGB BLD-MCNC: 14.3 G/DL
IMM GRANULOCYTES # BLD AUTO: 0.01 X10(3) UL (ref 0–1)
IMM GRANULOCYTES NFR BLD: 0.2 %
LDH SERPL L TO P-CCNC: 216 U/L
LYMPHOCYTES # BLD AUTO: 1.81 X10(3) UL (ref 1–4)
LYMPHOCYTES NFR BLD AUTO: 36.6 %
MCH RBC QN AUTO: 28.5 PG (ref 26–34)
MCHC RBC AUTO-ENTMCNC: 33.4 G/DL (ref 31–37)
MCV RBC AUTO: 85.3 FL
MONOCYTES # BLD AUTO: 0.51 X10(3) UL (ref 0.1–1)
MONOCYTES NFR BLD AUTO: 10.3 %
NEUTROPHILS # BLD AUTO: 2.47 X10 (3) UL (ref 1.5–7.7)
NEUTROPHILS # BLD AUTO: 2.47 X10(3) UL (ref 1.5–7.7)
NEUTROPHILS NFR BLD AUTO: 50.1 %
OSMOLALITY SERPL CALC.SUM OF ELEC: 293 MOSM/KG (ref 275–295)
PLATELET # BLD AUTO: 270 10(3)UL (ref 150–450)
POTASSIUM SERPL-SCNC: 4 MMOL/L (ref 3.5–5.1)
PROT SERPL-MCNC: 7.2 G/DL (ref 5.7–8.2)
RBC # BLD AUTO: 5.02 X10(6)UL
SODIUM SERPL-SCNC: 142 MMOL/L (ref 136–145)
WBC # BLD AUTO: 4.9 X10(3) UL (ref 4–11)

## 2024-06-05 PROCEDURE — 85025 COMPLETE CBC W/AUTO DIFF WBC: CPT

## 2024-06-05 PROCEDURE — 99213 OFFICE O/P EST LOW 20 MIN: CPT | Performed by: INTERNAL MEDICINE

## 2024-06-05 PROCEDURE — 83615 LACTATE (LD) (LDH) ENZYME: CPT

## 2024-06-05 PROCEDURE — 36591 DRAW BLOOD OFF VENOUS DEVICE: CPT

## 2024-06-05 PROCEDURE — 80053 COMPREHEN METABOLIC PANEL: CPT

## 2024-06-05 RX ORDER — SODIUM CHLORIDE 9 MG/ML
10 INJECTION, SOLUTION INTRAMUSCULAR; INTRAVENOUS; SUBCUTANEOUS ONCE
OUTPATIENT
Start: 2024-06-05

## 2024-06-05 RX ORDER — HEPARIN SODIUM (PORCINE) LOCK FLUSH IV SOLN 100 UNIT/ML 100 UNIT/ML
5 SOLUTION INTRAVENOUS ONCE
Status: COMPLETED | OUTPATIENT
Start: 2024-06-05 | End: 2024-06-05

## 2024-06-05 RX ORDER — HEPARIN SODIUM (PORCINE) LOCK FLUSH IV SOLN 100 UNIT/ML 100 UNIT/ML
5 SOLUTION INTRAVENOUS ONCE
OUTPATIENT
Start: 2024-06-05

## 2024-06-05 RX ADMIN — HEPARIN SODIUM (PORCINE) LOCK FLUSH IV SOLN 100 UNIT/ML 500 UNITS: 100 SOLUTION INTRAVENOUS at 10:35:00

## 2024-06-05 NOTE — PROGRESS NOTES
HPI     Azul Joseph is a 48 year old female here for follow up of   Encounter Diagnoses   Name Primary?    Follicular lymphoma grade II of intra-abdominal lymph nodes (HCC) Yes    Encounter for follow-up examination after completed treatment for malignant neoplasm        She s/p of cycle 6 of BR in August of 2022, and posttreatment imaging was consistent with a complete metabolic response.    She is here for surveillance visit.    Watching diet.  Lost weight with ozempic with weight loss clinic.    Denies any fevers, NS or involuntary weight loss.  No palpable LNs.   Port still in place but states not tender.  Some discomfort at R neck when turning it by port line site.      LMP 11/01/23.    ECOG PS 0      Review of Systems:     Review of Systems   Constitutional:  Negative for appetite change, diaphoresis, fatigue, fever and unexpected weight change.   Respiratory:  Negative for cough and shortness of breath.    Cardiovascular:  Negative for chest pain.   Gastrointestinal:  Negative for abdominal pain.   Endocrine: Negative for hot flashes.   Musculoskeletal:  Positive for arthralgias (r elbow, tender with pilates and also with walking dogs.). Negative for back pain and neck pain.        Bone pains.   Neurological:  Negative for dizziness and headaches.   Hematological:  Negative for adenopathy.   Psychiatric/Behavioral:  Negative for sleep disturbance (improved with magnesium supplement.).             Current Outpatient Medications   Medication Sig Dispense Refill    Omeprazole 40 MG Oral Capsule Delayed Release Take 1 capsule (40 mg total) by mouth before breakfast. 90 capsule 1    rosuvastatin 20 MG Oral Tab Take 1 tablet (20 mg total) by mouth nightly. 90 tablet 0    semaglutide (OZEMPIC, 0.25 OR 0.5 MG/DOSE,) 2 MG/3ML Subcutaneous Solution Pen-injector Inject 0.5 mg into the skin once a week. 3 mL 3     Allergies:   Allergies   Allergen Reactions    Latex RASH and ITCHING     redness       Past  Medical History:    Allergic rhinitis    Anxiety    occasional    Artificial insemination    Back problem    DDD    Cancer (HCC)    non hodgkin's lymphoma    Cholecystitis    Laser cholecystectomy     Decorative tattoo    Degenerative lumbar disc    Degenerative L2 disc, Therapy    H/O pregnancy    EAB x 2-, ;  X 3- , ?, 2009    Helicobacter positive gastritis    Noted on EGD; no ulcer. On abx    High cholesterol    Hormone disorder    Hyperlipidemia    Infertility    Art insemination    Lipid screening    per NG    Obesity (BMI 30-39.9)    Obesity, unspecified    Polycystic ovaries    Artificial insemination/clomid    Screen for colon cancer    no adenomatous polyp; repeat CLN in 10 yrs    Tonsillitis    Tonsillectomy    Type 2 diabetes mellitus without complication, without long-term current use of insulin (HCC)     Past Surgical History:   Procedure Laterality Date           X 3, , ?,        &     Cholecystectomy      laser cholecystectomy    Colonoscopy  2019    Colonoscopy N/A 2024    Procedure: COLONOSCOPY;  Surgeon: Barrie Kaufman MD;  Location: [facility] ENDOSCOPY    Skin surgery      #2 moles removed on back    Tonsillectomy       Social History     Socioeconomic History    Marital status:    Tobacco Use    Smoking status: Former     Current packs/day: 0.00     Types: Cigarettes     Quit date: 1993     Years since quittin.1    Smokeless tobacco: Never   Vaping Use    Vaping status: Never Used   Substance and Sexual Activity    Alcohol use: Yes     Alcohol/week: 1.0 standard drink of alcohol     Types: 1 Standard drinks or equivalent per week     Comment: very occasional    Drug use: No    Sexual activity: Yes     Partners: Male     Birth control/protection: Vasectomy   Other Topics Concern    Caffeine Concern Yes     Comment: Coffee, 1 cup daily    Exercise Yes     Comment: walking daily    History of tanning Yes     Breast feeding No    Reaction to local anesthetic No       Family History   Problem Relation Age of Onset    Diabetes Father 65    Obesity Father     Hypertension Mother     High Cholesterol Mother     Diabetes Mother         Bord DM    Cancer Mother 55        Breast Cancer    Heart Disorder Mother         coronary stent    Breast Cancer Mother 55    Heart Attack Mother     Obesity Mother     Alcohol and Other Disorders Associated Maternal Grandmother         alcoholism    Cancer Maternal Grandmother         Cancer-lung, age 42 (cause of death)    Diabetes Maternal Grandfather     Other (No hyperlipidemia) Other     Cancer Self 46        non hodgkins lymphoma         PHYSICAL EXAM:    BP (!) 146/91 (BP Location: Left arm, Patient Position: Sitting, Cuff Size: adult)   Pulse 70   Temp 97.8 °F (36.6 °C) (Oral)   Resp 18   Ht 1.6 m (5' 3\")   Wt 88.9 kg (196 lb)   LMP 11/01/2023 (Approximate)   SpO2 100%   BMI 34.72 kg/m²   Wt Readings from Last 6 Encounters:   06/05/24 88.9 kg (196 lb)   03/04/24 93.3 kg (205 lb 9.6 oz)   02/23/24 93 kg (205 lb)   02/01/24 95.3 kg (210 lb)   01/22/24 96.6 kg (213 lb)   12/29/23 90.7 kg (200 lb)     Physical Exam  General: Patient is alert, not in acute distress.  HEENT: EOMs intact. PERRL.   Neck: No JVD. No palpable lymphadenopathy. Neck is supple.  Chest: Clear to auscultation.  Heart: Regular rate and rhythm.   Abdomen: Soft, non tender with good bowel sounds.  Extremities: No edema.  Neurological: Grossly intact.   Lymphatics: There is no palpable lymphadenopathy throughout in the cervical, supraclavicular, axillary, or inguinal regions.  Psych/Depression: nl  Skin rash improved      ASSESSMENT/PLAN:     1. 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    Discussed with the patient that there is discordance in the pathology report and the level of activity on these keisha basins seen on the PET/CT.  Discussed that the level of activity on PET/CT is more suggestive of a high-grade lymphoma.    Discussed that the therapies for low-grade and high-grade lymphoma are markedly different, as well as he prognosis is different for these 2.    Discussed that in order to determine if this is a high-grade lymphoma, recommend an excisional biopsy.  This would be very difficult to diagnose with another image guided biopsy.  Discussed that there was an area of necrosis seen on the image guided core biopsy, which is feature usually associated with high-grade lymphoma.    She was referred to Dr. Mendoza from surgical oncology for a robotic guided biopsy of the mesenteric lymph nodes.  This was performed on 1/10/2022.    Discussed with patient that fortunately this is still consistent with a grade 1-2 follicular lymphoma.    Discussed with the patient that given diagnoses been confirmed as a low-grade follicular lymphoma, we will proceed with treatment with Bendamustine and rituximab.      Her FLIPI score is 0.  Discussed with the patient that this low risk group with the addition of rituximab to treatment plan, had a 2-year overall survival of 98%, 2-year progression free survival 84% and a median progression free survival of 84 months      S/p cycle 6 of BR in August 2022.    Patient with evidence of complete response on PET/CT prior [place] criteria.    Discussed on maintenance therapy with Rituxan for 2 years.  However, on updated NCCN guideline 4.2022 the state that maintenance therapy is suggested. Pt considered and declinined maintenance, based on data  discussed and guidelines.     On surveillance as per NCCN guidelines with CBC, CMP and LDH every 3 months for 2 yrs and CT scan every 6 months for 2 yrs (from August, 2022 to August, 2024).      Yrs 3-5 (until August of 2027) labs q 6 mo, then yearly.  H+P each visit.    CT in February 2024 AMANDA.  Next CT in August 2024, this will complete her imaging and then imaging will be as needed for symptom.      Return in about 3 months (around 9/5/2024) for surveillance, follow-up, with labs, with imaging.      MDM low    No orders of the defined types were placed in this encounter.      Results From Past 48 Hours:  Recent Results (from the past 48 hour(s))   COMP METABOLIC PANEL [E]    Collection Time: 06/05/24 10:33 AM   Result Value Ref Range    Glucose 99 70 - 99 mg/dL    Sodium 142 136 - 145 mmol/L    Potassium 4.0 3.5 - 5.1 mmol/L    Chloride 111 98 - 112 mmol/L    CO2 26.0 21.0 - 32.0 mmol/L    Anion Gap 5 0 - 18 mmol/L    BUN 11 9 - 23 mg/dL    Creatinine 0.77 0.55 - 1.02 mg/dL    BUN/CREA Ratio 14.3 10.0 - 20.0    Calcium, Total 8.9 8.7 - 10.4 mg/dL    Calculated Osmolality 293 275 - 295 mOsm/kg    eGFR-Cr 95 >=60 mL/min/1.73m2    ALT 31 10 - 49 U/L    AST 21 <=34 U/L    Alkaline Phosphatase 63 39 - 100 U/L    Bilirubin, Total 1.0 0.3 - 1.2 mg/dL    Total Protein 7.2 5.7 - 8.2 g/dL    Albumin 4.5 3.2 - 4.8 g/dL    Globulin  2.7 2.0 - 3.5 g/dL    A/G Ratio 1.7 1.0 - 2.0    Patient Fasting for CMP? Patient not present    LDH [E]    Collection Time: 06/05/24 10:33 AM   Result Value Ref Range     120 - 246 U/L   CBC W/ DIFFERENTIAL    Collection Time: 06/05/24 10:33 AM   Result Value Ref Range    WBC 4.9 4.0 - 11.0 x10(3) uL    RBC 5.02 3.80 - 5.30 x10(6)uL    HGB 14.3 12.0 - 16.0 g/dL    HCT 42.8 35.0 - 48.0 %    MCV 85.3 80.0 - 100.0 fL    MCH 28.5 26.0 - 34.0 pg    MCHC 33.4 31.0 - 37.0 g/dL    RDW-SD 38.7 35.1 - 46.3 fL    RDW 12.6 11.0 - 15.0 %    .0 150.0 - 450.0 10(3)uL    Neutrophil Absolute Prelim  2.47 1.50 - 7.70 x10 (3) uL    Neutrophil Absolute 2.47 1.50 - 7.70 x10(3) uL    Lymphocyte Absolute 1.81 1.00 - 4.00 x10(3) uL    Monocyte Absolute 0.51 0.10 - 1.00 x10(3) uL    Eosinophil Absolute 0.11 0.00 - 0.70 x10(3) uL    Basophil Absolute 0.03 0.00 - 0.20 x10(3) uL    Immature Granulocyte Absolute 0.01 0.00 - 1.00 x10(3) uL    Neutrophil % 50.1 %    Lymphocyte % 36.6 %    Monocyte % 10.3 %    Eosinophil % 2.2 %    Basophil % 0.6 %    Immature Granulocyte % 0.2 %       Imaging & Referrals:  CT CHEST+ABDOMEN+PELVIS(ALL [facility] ONLY)(SWQ=[id number])     Component      Latest Ref Rng 3/4/2024 6/5/2024   WBC      4.0 - 11.0 x10(3) uL 5.7  4.9    RBC      3.80 - 5.30 x10(6)uL 4.95  5.02    Hemoglobin      12.0 - 16.0 g/dL 13.9  14.3    Hematocrit      35.0 - 48.0 % 42.0  42.8    MCV      80.0 - 100.0 fL 84.8  85.3    MCH      26.0 - 34.0 pg 28.1  28.5    MCHC      31.0 - 37.0 g/dL 33.1  33.4    RDW-SD      35.1 - 46.3 fL 40.1  38.7    RDW      11.0 - 15.0 % 13.0  12.6    Platelet Count      150.0 - 450.0 10(3)uL 283.0  270.0    Prelim Neutrophil Abs      1.50 - 7.70 x10 (3) uL 3.07  2.47    Neutrophils Absolute      1.50 - 7.70 x10(3) uL 3.07  2.47    Lymphocytes Absolute      1.00 - 4.00 x10(3) uL 1.88  1.81    Monocytes Absolute      0.10 - 1.00 x10(3) uL 0.58  0.51    Eosinophils Absolute      0.00 - 0.70 x10(3) uL 0.13  0.11    Basophils Absolute      0.00 - 0.20 x10(3) uL 0.03  0.03    Immature Granulocyte Absolute      0.00 - 1.00 x10(3) uL 0.01  0.01    Neutrophils %      % 53.8  50.1    Lymphocytes %      % 33.0  36.6    Monocytes %      % 10.2  10.3    Eosinophils %      % 2.3  2.2    Basophils %      % 0.5  0.6    Immature Granulocyte %      % 0.2  0.2    Glucose      70 - 99 mg/dL 93  99    Sodium      136 - 145 mmol/L 141  142    Potassium      3.5 - 5.1 mmol/L 3.9  4.0    Chloride      98 - 112 mmol/L 111  111    Carbon Dioxide, Total      21.0 - 32.0 mmol/L 25.0  26.0    ANION GAP      0 - 18 mmol/L  5  5    BUN      9 - 23 mg/dL 9  11    CREATININE      0.55 - 1.02 mg/dL 0.72  0.77    BUN/CREATININE RATIO      10.0 - 20.0  12.5  14.3    CALCIUM      8.7 - 10.4 mg/dL 8.9  8.9    CALCULATED OSMOLALITY      275 - 295 mOsm/kg 290  293    EGFR      >=60 mL/min/1.73m2 102  95    ALT (SGPT)      10 - 49 U/L 53 (H)  31    AST (SGOT)      <=34 U/L 32  21    ALKALINE PHOSPHATASE      39 - 100 U/L 65  63    Total Bilirubin      0.3 - 1.2 mg/dL 1.1  1.0    PROTEIN, TOTAL      5.7 - 8.2 g/dL 6.9  7.2    Albumin      3.2 - 4.8 g/dL 4.4  4.5    Globulin      2.0 - 3.5 g/dL 2.5 (L)  2.7    A/G Ratio      1.0 - 2.0  1.8  1.7    Patient Fasting for CMP? Patient not present  Patient not present    LDH      120 - 246 U/L 214  216       Legend:  (H) High  (L) Low

## 2024-07-23 ENCOUNTER — OFFICE VISIT (OUTPATIENT)
Dept: SURGERY | Facility: CLINIC | Age: 49
End: 2024-07-23
Payer: COMMERCIAL

## 2024-07-23 VITALS
WEIGHT: 195.13 LBS | HEART RATE: 88 BPM | BODY MASS INDEX: 33.31 KG/M2 | DIASTOLIC BLOOD PRESSURE: 82 MMHG | HEIGHT: 64 IN | SYSTOLIC BLOOD PRESSURE: 128 MMHG | OXYGEN SATURATION: 98 %

## 2024-07-23 DIAGNOSIS — E66.9 OBESITY (BMI 30-39.9): ICD-10-CM

## 2024-07-23 DIAGNOSIS — E78.1 HYPERTRIGLYCERIDEMIA: ICD-10-CM

## 2024-07-23 DIAGNOSIS — F43.9 STRESS: ICD-10-CM

## 2024-07-23 DIAGNOSIS — E11.9 TYPE 2 DIABETES MELLITUS WITHOUT COMPLICATION, WITHOUT LONG-TERM CURRENT USE OF INSULIN (HCC): Primary | ICD-10-CM

## 2024-07-23 DIAGNOSIS — Z51.81 ENCOUNTER FOR THERAPEUTIC DRUG MONITORING: ICD-10-CM

## 2024-07-23 DIAGNOSIS — E66.9 OBESITY (BMI 30-39.9): Primary | ICD-10-CM

## 2024-07-23 PROCEDURE — 99214 OFFICE O/P EST MOD 30 MIN: CPT | Performed by: INTERNAL MEDICINE

## 2024-07-23 PROCEDURE — 3008F BODY MASS INDEX DOCD: CPT | Performed by: INTERNAL MEDICINE

## 2024-07-23 PROCEDURE — 3079F DIAST BP 80-89 MM HG: CPT | Performed by: INTERNAL MEDICINE

## 2024-07-23 PROCEDURE — 3074F SYST BP LT 130 MM HG: CPT | Performed by: INTERNAL MEDICINE

## 2024-07-23 RX ORDER — SEMAGLUTIDE 0.68 MG/ML
0.5 INJECTION, SOLUTION SUBCUTANEOUS WEEKLY
Qty: 3 ML | Refills: 3 | Status: SHIPPED | OUTPATIENT
Start: 2024-07-23 | End: 2024-08-22

## 2024-07-23 NOTE — PROGRESS NOTES
[facility], [facility], [place]  [address] [facility] [address] [place] [id number]  Dept: [phone]       Patient:  Azul Joseph  :      1975  MRN:      [id number]    Chief Complaint:    Chief Complaint   Patient presents with    Follow - Up    Weight Management       SUBJECTIVE     History of Present Illness:  Azul is being seen today for a follow-up for non surgical weight loss    Past Medical History:   Past Medical History:    Allergic rhinitis    Anxiety    occasional    Artificial insemination    Back problem    DDD    Cancer (HCC)    non hodgkin's lymphoma    Cholecystitis    Laser cholecystectomy     Decorative tattoo    Degenerative lumbar disc    Degenerative L2 disc, Therapy    H/O pregnancy    EAB x 2-, ;  X 3- , ?, 2009    Helicobacter positive gastritis    Noted on EGD; no ulcer. On abx    High cholesterol    Hormone disorder    Hyperlipidemia    Infertility    Art insemination    Lipid screening    per NG    Obesity (BMI 30-39.9)    Obesity, unspecified    Polycystic ovaries    Artificial insemination/clomid    Screen for colon cancer    no adenomatous polyp; repeat CLN in 10 yrs    Tonsillitis    Tonsillectomy    Type 2 diabetes mellitus without complication, without long-term current use of insulin (HCC)        Comorbidities:  Back pain-Improvement?  yes, Joint pain-Improvement?  yes, SHAUNNA-Improvement?  yes, and Snoring-Improvement?  yes    OBJECTIVE     Vitals: /82   Pulse 88   Ht 5' 4\" (1.626 m)   Wt 195 lb 2 oz (88.5 kg)   LMP 2024 (Exact Date)   SpO2 98%   BMI 33.49 kg/m²     Initial weight loss: -25   Total weight loss: -20   Start weight: 215    Wt Readings from Last 3 Encounters:   24 195 lb 2 oz (88.5 kg)   24 196 lb (88.9 kg)   24 205 lb 9.6 oz (93.3 kg)       Patient Medications:    Current Outpatient Medications   Medication Sig Dispense Refill    Omeprazole 40 MG Oral  Capsule Delayed Release Take 1 capsule (40 mg total) by mouth before breakfast. 90 capsule 1    semaglutide (OZEMPIC, 0.25 OR 0.5 MG/DOSE,) 2 MG/3ML Subcutaneous Solution Pen-injector Inject 0.5 mg into the skin once a week. 3 mL 3    rosuvastatin 20 MG Oral Tab Take 1 tablet (20 mg total) by mouth nightly. 90 tablet 0     Allergies:  Latex     Social History:    Social History     Socioeconomic History    Marital status:      Spouse name: Not on file    Number of children: Not on file    Years of education: Not on file    Highest education level: Not on file   Occupational History    Not on file   Tobacco Use    Smoking status: Former     Current packs/day: 0.00     Types: Cigarettes     Quit date: 1993     Years since quittin.3    Smokeless tobacco: Never   Vaping Use    Vaping status: Never Used   Substance and Sexual Activity    Alcohol use: Yes     Alcohol/week: 1.0 standard drink of alcohol     Types: 1 Standard drinks or equivalent per week     Comment: very occasional    Drug use: No    Sexual activity: Yes     Partners: Male     Birth control/protection: Vasectomy   Other Topics Concern     Service Not Asked    Blood Transfusions Not Asked    Caffeine Concern Yes     Comment: Coffee, 1 cup daily    Occupational Exposure Not Asked    Hobby Hazards Not Asked    Sleep Concern Not Asked    Stress Concern Not Asked    Weight Concern Not Asked    Special Diet Not Asked    Back Care Not Asked    Exercise Yes     Comment: walking daily    Bike Helmet Not Asked    Seat Belt Not Asked    Self-Exams Not Asked    Grew up on a farm Not Asked    History of tanning Yes    Outdoor occupation Not Asked    Pt has a pacemaker Not Asked    Pt has a defibrillator Not Asked    Breast feeding No    Reaction to local anesthetic No   Social History Narrative    Not on file     Social Determinants of Health     Financial Resource Strain: Not on file   Food Insecurity: Not on file   Transportation Needs: Not  on file   Physical Activity: Not on file   Stress: Not on file   Social Connections: Not on file   Housing Stability: Not on file     Surgical History:    Past Surgical History:   Procedure Laterality Date           X 3, , ?, 2009       &     Cholecystectomy      laser cholecystectomy    Colonoscopy  2019    Colonoscopy N/A 2024    Procedure: COLONOSCOPY;  Surgeon: Barrie Kaufman MD;  Location: [facility] ENDOSCOPY    Skin surgery      #2 moles removed on back    Tonsillectomy       Family History:    Family History   Problem Relation Age of Onset    Diabetes Father 65    Obesity Father     Hypertension Mother     High Cholesterol Mother     Diabetes Mother         Bord DM    Cancer Mother 55        Breast Cancer    Heart Disorder Mother         coronary stent    Breast Cancer Mother 55    Heart Attack Mother     Obesity Mother     Alcohol and Other Disorders Associated Maternal Grandmother         alcoholism    Cancer Maternal Grandmother         Cancer-lung, age 42 (cause of death)    Diabetes Maternal Grandfather     Other (No hyperlipidemia) Other     Cancer Self 46        non hodgkins lymphoma       Food Journal  Reviewed and Discussed:       Patient has a Food Journal?: yes   Patient is reading nutrition labels?  yes  Average Caloric Intake:     Average CHO Intake: 110  Is patient exercising? yes  Type of exercise? walking    Eating Habits  Patient states the following:  Eats 2 meal(s) per day  Length of time it takes to consume a meal:  20  # of snacks per day: 1 Type of snacks:    Amount of soda consumption per day:    Amount of water (in ounces) per day:  48  Drinking between meals only:  yes  Toughest challenge:      Nutritional Goals  Limit carbohydrates to 100 gms per day, Eat 100-200 calories within 1 hour of waking , and Eat 3-4 cups of fresh fruits or vegetables daily    Behavior Modifications Reviewed and Discussed  Eat breakfast, Eat 3 meals per day, Plan  meals in advance, Read nutrition labels, Drink 64 oz of water per day, Maintain a daily food journal, No drinking 30 minutes before or after meals, Utlize portion control strategies to reduce calorie intake, Identify triggers for eating and manage cues, and Eat slowly and take 20 to 30 minutes to complete each meal    Exercise Goals Reviewed and Discussed        ROS:    Constitutional: positive for fatigue  Respiratory: positive for dyspnea on exertion  Cardiovascular: negative  Gastrointestinal: negative  Musculoskeletal:negative  Neurological: positive for headaches  Behavioral/Psych: positive for stress  Endocrine: negative  All other systems were reviewed and are negative    Physical Exam:   General appearance: alert, appears stated age, cooperative, and morbidly obese  Head: Normocephalic, without obvious abnormality, atraumatic  Neck: no adenopathy, no carotid bruit, no JVD, supple, symmetrical, trachea midline, and thyroid not enlarged, symmetric, no tenderness/mass/nodules  Back: symmetric, no curvature. ROM normal. No CVA tenderness.  Lungs: clear to auscultation bilaterally  Heart: S1, S2 normal, no murmur, click, rub or gallop, regular rate and rhythm  Abdomen:  soft, obese, non tender  Extremities: extremities normal, atraumatic, no cyanosis or edema  Pulses: 2+ and symmetric  Skin: Skin color, texture, turgor normal. No rashes or lesions  Neurologic: Grossly normal    ASSESSMENT       Encounter Diagnosis(ses):   Encounter Diagnoses   Name Primary?    Type 2 diabetes mellitus without complication, without long-term current use of insulin (HCC) Yes    Stress     Hypertriglyceridemia     Encounter for therapeutic drug monitoring     Obesity (BMI 30-39.9)        PLAN     Patient is not interested in bariatric surgery. Patient desires to pursue traditional weight loss at this time.      DM2: newly diagnosed  Will start Ozempic  Continue with carb controlled diet    DYSLIPIDEMIA: Stable on the above  prescribed meal plan  Liver function stable.    Lab Results   Component Value Date/Time    CHOLEST 151 02/21/2024 10:38 AM    LDL 82 02/21/2024 10:38 AM    HDL 48 02/21/2024 10:38 AM    TRIG 115 02/21/2024 10:38 AM    VLDL 18 02/21/2024 10:38 AM       Goals for next month:  1. Keep a food log.  2. Drink 48-64 ounces of non-caloric beverages per day. No fruit juices or regular soda.  3. Increase activity-upper body exercises, walk 10 minutes per day.  4. Increase fruit and vegetable servings to 5-6 per day.      Did not tolerate Phentermine    Tolerating Ozempic  Helping with appetite  Getting covered by [employer]      Diagnoses and all orders for this visit:    Type 2 diabetes mellitus without complication, without long-term current use of insulin (HCC)    Stress    Hypertriglyceridemia    Encounter for therapeutic drug monitoring    Obesity (BMI 30-39.9)          Jt Tejeda MD

## 2024-08-13 ENCOUNTER — NURSE TRIAGE (OUTPATIENT)
Dept: INTERNAL MEDICINE CLINIC | Facility: CLINIC | Age: 49
End: 2024-08-13

## 2024-08-13 NOTE — TELEPHONE ENCOUNTER
Spoke with patient, (  Name and date of birth verified ) informed of Dr. Jordan's  instructions below    She currently does not have a headache     She went to the local Fire dept  , they ran an EKG strip  was told heart was fine     Checked b/p  137/94  and 131/98    Routing as For Your Information       Future Appointments   Date Time Provider Department Center   8/15/2024 10:15 AM Weston Jordan MD [facility]   8/26/2024  9:30 AM [facility] CT RM3 [facility] CT EM [place]   9/6/2024  8:30 AM  [facility] LAB1 [facility] CHEMO EMO   9/6/2024  9:30 AM Trever Dunlap MD [facility] HEM ONC EMO   10/4/2024 10:00 AM Weston Jordan MD [facility]   1/6/2025 12:15 PM Jt Tejeda MD [address]

## 2024-08-13 NOTE — TELEPHONE ENCOUNTER
Action Requested: Summary for Provider     []  Critical Lab, Recommendations Needed  [x] Need Additional Advice  []   FYI    []   Need Orders  [] Need Medications Sent to Pharmacy  []  Other     SUMMARY: To be seen today, no appointments. Patient cannot be seen until after 1:30 pm today.    C/o on and off headaches. Experienced a sudden, pounding headache and felt hot during intercourse last night; stopped as she also felt dizzy. Applied an ice pack to back of neck, drank a bottle of water and it went away. First started as ocular now to the back of the head- always a \"pounding\" headache. Will have pressure behind eyes at times, tension to neck, needs to take \"deep breaths\" at times. Pain 4-7/10    Reason for call: Headache  Onset: 2 weeks    *has been under more stress lately and has been more \"emotional\" due to stress    Denies fever, cold symptoms, visual changes, radiating pain down arms    Has been taking/doing over the counter:  Advil and Excedrin with relief at times    Went over home care advice. Call back, go to Walk in care or Immediate care if new symptoms arise,  if signs/symptoms worsen or has questions or concerns. Patient verbalized understanding and agrees with plan.    Reason for Disposition   Patient wants to be seen    Protocols used: Headache-A-OH

## 2024-08-13 NOTE — TELEPHONE ENCOUNTER
Please have her see first available provider regarding this.  If recurs again I would recommend ER.

## 2024-08-15 ENCOUNTER — OFFICE VISIT (OUTPATIENT)
Dept: INTERNAL MEDICINE CLINIC | Facility: CLINIC | Age: 49
End: 2024-08-15
Payer: COMMERCIAL

## 2024-08-15 VITALS
TEMPERATURE: 98 F | WEIGHT: 193 LBS | BODY MASS INDEX: 32.95 KG/M2 | OXYGEN SATURATION: 98 % | DIASTOLIC BLOOD PRESSURE: 86 MMHG | HEIGHT: 64 IN | SYSTOLIC BLOOD PRESSURE: 148 MMHG | HEART RATE: 84 BPM | RESPIRATION RATE: 18 BRPM

## 2024-08-15 DIAGNOSIS — G44.209 ACUTE NON INTRACTABLE TENSION-TYPE HEADACHE: Primary | ICD-10-CM

## 2024-08-15 PROCEDURE — G2211 COMPLEX E/M VISIT ADD ON: HCPCS | Performed by: INTERNAL MEDICINE

## 2024-08-15 PROCEDURE — 3008F BODY MASS INDEX DOCD: CPT | Performed by: INTERNAL MEDICINE

## 2024-08-15 PROCEDURE — 3079F DIAST BP 80-89 MM HG: CPT | Performed by: INTERNAL MEDICINE

## 2024-08-15 PROCEDURE — 3077F SYST BP >= 140 MM HG: CPT | Performed by: INTERNAL MEDICINE

## 2024-08-15 PROCEDURE — 99214 OFFICE O/P EST MOD 30 MIN: CPT | Performed by: INTERNAL MEDICINE

## 2024-08-15 RX ORDER — BUTALBITAL, ACETAMINOPHEN AND CAFFEINE 50; 325; 40 MG/1; MG/1; MG/1
1 TABLET ORAL EVERY 6 HOURS PRN
Qty: 30 TABLET | Refills: 0 | Status: SHIPPED | OUTPATIENT
Start: 2024-08-15

## 2024-08-15 NOTE — PROGRESS NOTES
Patient ID: Azul Joseph is a 49 year old female.  Chief Complaint   Patient presents with    Headache        HISTORY OF PRESENT ILLNESS:   HPI  Patient presents for above.  For the past 2 weeks has been having headaches with no discernible pattern.  She has been under significant stress lately with turning 49, death of a loved one, death of a dog.  Has been taking Tylenol on a daily basis with moderate improvement in symptoms.  Symptoms recurred again when she was having sexual intercourse.    Review of Systems  Ten point review of systems otherwise negative with the exception of HPI and assessment and plan.    MEDICAL HISTORY:     Past Medical History:    Allergic rhinitis    Anxiety    occasional    Artificial insemination    Back problem    DDD    Cancer (HCC)    non hodgkin's lymphoma    Cholecystitis    Laser cholecystectomy     Decorative tattoo    Degenerative lumbar disc    Degenerative L2 disc, Therapy    H/O pregnancy    EAB x 2-, ;  X 3- , ?, 2009    Helicobacter positive gastritis    Noted on EGD; no ulcer. On abx    High cholesterol    Hormone disorder    Hyperlipidemia    Infertility    Art insemination    Lipid screening    per NG    Obesity (BMI 30-39.9)    Obesity, unspecified    Polycystic ovaries    Artificial insemination/clomid    Screen for colon cancer    no adenomatous polyp; repeat CLN in 10 yrs    Tonsillitis    Tonsillectomy    Type 2 diabetes mellitus without complication, without long-term current use of insulin (HCC)       Past Surgical History:   Procedure Laterality Date           X 3, , ?, 2009       &     Cholecystectomy      laser cholecystectomy    Colonoscopy  2019    Colonoscopy N/A 2024    Procedure: COLONOSCOPY;  Surgeon: Barrie Kaufman MD;  Location: [facility] ENDOSCOPY    Skin surgery      #2 moles removed on back    Tonsillectomy           Current Outpatient Medications:      butalbital-acetaminophen-caffeine -40 MG Oral Tab, Take 1 tablet by mouth every 6 (six) hours as needed for Pain., Disp: 30 tablet, Rfl: 0    semaglutide (OZEMPIC, 0.25 OR 0.5 MG/DOSE,) 2 MG/3ML Subcutaneous Solution Pen-injector, Inject 0.5 mg into the skin once a week., Disp: 3 mL, Rfl: 3    Allergies:  Allergies   Allergen Reactions    Latex RASH and ITCHING     redness       Social History     Socioeconomic History    Marital status:      Spouse name: Not on file    Number of children: Not on file    Years of education: Not on file    Highest education level: Not on file   Occupational History    Not on file   Tobacco Use    Smoking status: Former     Current packs/day: 0.00     Types: Cigarettes     Quit date: 1993     Years since quittin.3    Smokeless tobacco: Never   Vaping Use    Vaping status: Never Used   Substance and Sexual Activity    Alcohol use: Yes     Alcohol/week: 1.0 standard drink of alcohol     Types: 1 Standard drinks or equivalent per week     Comment: very occasional    Drug use: No    Sexual activity: Yes     Partners: Male     Birth control/protection: Vasectomy   Other Topics Concern     Service Not Asked    Blood Transfusions Not Asked    Caffeine Concern Yes     Comment: Coffee, 1 cup daily    Occupational Exposure Not Asked    Hobby Hazards Not Asked    Sleep Concern Not Asked    Stress Concern Not Asked    Weight Concern Not Asked    Special Diet Not Asked    Back Care Not Asked    Exercise Yes     Comment: walking daily    Bike Helmet Not Asked    Seat Belt Not Asked    Self-Exams Not Asked    Grew up on a farm Not Asked    History of tanning Yes    Outdoor occupation Not Asked    Pt has a pacemaker Not Asked    Pt has a defibrillator Not Asked    Breast feeding No    Reaction to local anesthetic No   Social History Narrative    Not on file     Social Determinants of Health     Financial Resource Strain: Not on file   Food Insecurity: Not on file    Transportation Needs: Not on file   Physical Activity: Not on file   Stress: Not on file   Social Connections: Not on file   Housing Stability: Not on file           PHYSICAL EXAM:     Vitals:    08/15/24 1012   BP: 148/86   Pulse: 84   Resp: 18   Temp: 98 °F (36.7 °C)   TempSrc: Temporal   SpO2: 98%   Weight: 193 lb (87.5 kg)   Height: 5' 4\" (1.626 m)       Body mass index is 33.13 kg/m².    Physical Exam  Constitutional:       Appearance: Normal appearance.   Eyes:      General: No scleral icterus.  Cardiovascular:      Rate and Rhythm: Normal rate and regular rhythm.      Pulses: Normal pulses.      Heart sounds: Normal heart sounds. No murmur heard.  Pulmonary:      Effort: Pulmonary effort is normal. No respiratory distress.      Breath sounds: Normal breath sounds. No stridor. No wheezing or rhonchi.   Neurological:      General: No focal deficit present.      Mental Status: She is alert.           ASSESSMENT/PLAN:   1. Acute non intractable tension-type headache  butalbital-acetaminophen-caffeine -40 MG Oral Tab; Take 1 tablet by mouth every 6 (six) hours as needed for Pain.  Dispense: 30 tablet; Refill: 0  Suspect this is due to stress.  Explained that she cannot take on everyone else's problems.    Return if symptoms worsen or fail to improve.    This note was prepared using Dragon Medical voice recognition dictation software. As a result errors may occur. When identified these errors have been corrected. While every attempt is made to correct errors during dictation discrepancies may still exist.    Weston Jordan MD  8/15/2024

## 2024-08-22 ENCOUNTER — PATIENT MESSAGE (OUTPATIENT)
Dept: INTERNAL MEDICINE CLINIC | Facility: CLINIC | Age: 49
End: 2024-08-22

## 2024-08-22 NOTE — TELEPHONE ENCOUNTER
From: Azul Joseph  To: Weston Jordan  Sent: 8/22/2024 10:45 AM CDT  Subject: Headaches    Good morning, just a quick update. Had another horrible headache yesterday.Took Advil, Exedrin and then the Rx you prescribed....no luck. Took my BP it was 131/91 last night. Took rx again this morning and still have this headache, mostly behind my left eye and into the back of my head.  Now what?

## 2024-08-26 ENCOUNTER — HOSPITAL ENCOUNTER (OUTPATIENT)
Dept: CT IMAGING | Facility: HOSPITAL | Age: 49
Discharge: HOME | End: 2024-08-26
Attending: INTERNAL MEDICINE
Payer: COMMERCIAL

## 2024-08-26 DIAGNOSIS — C82.13 FOLLICULAR LYMPHOMA GRADE II OF INTRA-ABDOMINAL LYMPH NODES (HCC): ICD-10-CM

## 2024-08-26 DIAGNOSIS — Z08 ENCOUNTER FOR FOLLOW-UP EXAMINATION AFTER COMPLETED TREATMENT FOR MALIGNANT NEOPLASM: ICD-10-CM

## 2024-08-26 LAB
CREAT BLD-MCNC: 0.8 MG/DL
EGFRCR SERPLBLD CKD-EPI 2021: 90 ML/MIN/1.73M2 (ref 60–?)

## 2024-08-26 PROCEDURE — 74177 CT ABD & PELVIS W/CONTRAST: CPT | Performed by: INTERNAL MEDICINE

## 2024-08-26 PROCEDURE — 71260 CT THORAX DX C+: CPT | Performed by: INTERNAL MEDICINE

## 2024-08-26 PROCEDURE — 82565 ASSAY OF CREATININE: CPT

## 2024-08-27 ENCOUNTER — TELEPHONE (OUTPATIENT)
Dept: INTERNAL MEDICINE CLINIC | Facility: CLINIC | Age: 49
End: 2024-08-27

## 2024-08-27 ENCOUNTER — TELEMEDICINE (OUTPATIENT)
Dept: INTERNAL MEDICINE CLINIC | Facility: CLINIC | Age: 49
End: 2024-08-27
Payer: COMMERCIAL

## 2024-08-27 DIAGNOSIS — G44.209 ACUTE NON INTRACTABLE TENSION-TYPE HEADACHE: Primary | ICD-10-CM

## 2024-08-27 PROCEDURE — 99213 OFFICE O/P EST LOW 20 MIN: CPT | Performed by: INTERNAL MEDICINE

## 2024-08-27 PROCEDURE — G2211 COMPLEX E/M VISIT ADD ON: HCPCS | Performed by: INTERNAL MEDICINE

## 2024-08-27 NOTE — TELEPHONE ENCOUNTER
Pt called as advised in mychart , woke up this morning , headache woke her up about at 2 AM, no nausea, explains there is a lot of pressure behind her eyes  Video visit made to discuss         Good morning, just a quick update. Had another horrible headache yesterday.Took Advil, Exedrin and then the Rx you prescribed....no luck. Took my BP it was 131/91 last night. Took rx again this morning and still have this headache, mostly behind my left eye and into the back of my head.  Now what?

## 2024-08-27 NOTE — PROGRESS NOTES
Patient ID: Azul Joseph is a 49 year old female.  Chief Complaint   Patient presents with    Headache          HISTORY OF PRESENT ILLNESS:   Patient presents for above.  This visit is conducted using Telemedicine with live, interactive video and audio.  Here to discuss her headaches.  She awoke in the middle the night with a headache recently.  Fioricet not effective for her.  Stresses have improved.  Advil is effective.  She feels like she gets an aura before her headaches.  She does not snore or have apneic episodes per the .  She does have very heavy menses.  For one year did not have periods when she was going through chemotherapy.  Hemoglobin 3 months was normal.    Review of Systems   Constitutional: Negative.    HENT: Negative.     Eyes: Negative.    Respiratory: Negative.     Cardiovascular: Negative.    Gastrointestinal: Negative.    Endocrine: Negative.    Genitourinary: Negative.    Musculoskeletal: Negative.    Allergic/Immunologic: Negative.    Neurological:  Positive for headaches.   Hematological: Negative.    Psychiatric/Behavioral: Negative.        MEDICAL HISTORY:     Past Medical History:    Allergic rhinitis    Anxiety    occasional    Artificial insemination    Back problem    DDD    Cancer (HCC)    non hodgkin's lymphoma    Cholecystitis    Laser cholecystectomy     Decorative tattoo    Degenerative lumbar disc    Degenerative L2 disc, Therapy    H/O pregnancy    EAB x 2-, ;  X 3- 2003, ?, 2009    Helicobacter positive gastritis    Noted on EGD; no ulcer. On abx    High cholesterol    Hormone disorder    Hyperlipidemia    Infertility    Art insemination    Lipid screening    per NG    Obesity (BMI 30-39.9)    Obesity, unspecified    Polycystic ovaries    Artificial insemination/clomid    Screen for colon cancer    no adenomatous polyp; repeat CLN in 10 yrs    Tonsillitis    Tonsillectomy    Type 2 diabetes mellitus without complication, without long-term  current use of insulin (HCC)       Past Surgical History:   Procedure Laterality Date           X 3, 2003, ?, 2009       &     Cholecystectomy      laser cholecystectomy    Colonoscopy      Colonoscopy N/A 2024    Procedure: COLONOSCOPY;  Surgeon: Barrie Kaufman MD;  Location: [facility] ENDOSCOPY    Skin surgery      #2 moles removed on back    Tonsillectomy           Current Outpatient Medications:     butalbital-acetaminophen-caffeine -40 MG Oral Tab, Take 1 tablet by mouth every 6 (six) hours as needed for Pain., Disp: 30 tablet, Rfl: 0    semaglutide (OZEMPIC, 0.25 OR 0.5 MG/DOSE,) 2 MG/3ML Subcutaneous Solution Pen-injector, Inject 0.5 mg into the skin once a week., Disp: 3 mL, Rfl: 3    Allergies:  Allergies   Allergen Reactions    Latex RASH and ITCHING     redness       Social History     Socioeconomic History    Marital status:      Spouse name: Not on file    Number of children: Not on file    Years of education: Not on file    Highest education level: Not on file   Occupational History    Not on file   Tobacco Use    Smoking status: Former     Current packs/day: 0.00     Types: Cigarettes     Quit date: 1993     Years since quittin.4    Smokeless tobacco: Never   Vaping Use    Vaping status: Never Used   Substance and Sexual Activity    Alcohol use: Yes     Alcohol/week: 1.0 standard drink of alcohol     Types: 1 Standard drinks or equivalent per week     Comment: very occasional    Drug use: No    Sexual activity: Yes     Partners: Male     Birth control/protection: Vasectomy   Other Topics Concern     Service Not Asked    Blood Transfusions Not Asked    Caffeine Concern Yes     Comment: Coffee, 1 cup daily    Occupational Exposure Not Asked    Hobby Hazards Not Asked    Sleep Concern Not Asked    Stress Concern Not Asked    Weight Concern Not Asked    Special Diet Not Asked    Back Care Not Asked    Exercise Yes     Comment:  walking daily    Bike Helmet Not Asked    Seat Belt Not Asked    Self-Exams Not Asked    Grew up on a farm Not Asked    History of tanning Yes    Outdoor occupation Not Asked    Pt has a pacemaker Not Asked    Pt has a defibrillator Not Asked    Breast feeding No    Reaction to local anesthetic No   Social History Narrative    Not on file     Social Determinants of Health     Financial Resource Strain: Not on file   Food Insecurity: Not on file   Transportation Needs: Not on file   Physical Activity: Not on file   Stress: Not on file   Social Connections: Not on file   Housing Stability: Not on file       PHYSICAL EXAM:   Unable to perform vitals or do physical exam as this is a virtual video visit.  Patient appears alert.  No conversational dyspnea or distress.    ASSESSMENT/PLAN:   1. Acute non intractable tension-type headache  Unclear etiology.  Unlikely sleep apnea.  CT BRAIN OR HEAD (CPT=70450); Future  Neuro Referral - [facility] ([facility])  CBC With Differential With Platelet; Future    Return if symptoms worsen or fail to improve.    Time spent on encounter  15 minutes   Video time 10 minutes   Documentation time 5 minutes     Azul Joseph understands video evaluation is not a substitute for face-to-face examination or emergency care. Patient advised to go to ER or call 911 for worsening symptoms or acute distress.     [facility]  Telehealth Verbal Consent   I conducted a telehealth visit with Azul Joseph today, 08/27/24, which was completed using two-way, real-time interactive audio and video communication. This has been done in good mesha to provide continuity of care in the best interest of the provider-patient relationship, due to the COVID -19 public health crisis/national emergency where restrictions of face-to-face office visits are ongoing. Every conscious effort was taken to allow for sufficient and adequate time to complete the visit.  The patient was made aware  of the limitations of the telehealth visit, including treatment limitations as no physical exam could be performed.  The patient was advised to call 911 or to go to the ER in case there was an emergency.  The patient was also advised of the potential privacy & security concerns related to the telehealth platform.   The patient was made aware of where to find [facility]'s notice of privacy practices, telehealth consent form and other related consent forms and documents.  which are located on the [facility] website. The patient verbally agreed to telehealth consent form, related consents and the risks discussed.    Lastly, the patient confirmed that they were in [place].   Included in this visit, time may have been spent reviewing labs, medications, radiology tests and decision making. Appropriate medical decision-making and tests are ordered as detailed in the plan of care above.  Coding/billing information is submitted for this visit based on complexity of care and/or time spent for the visit.    This note was prepared using Dragon Medical voice recognition dictation software. As a result errors may occur. When identified these errors have been corrected. While every attempt is made to correct errors during dictation discrepancies may still exist.    Weston Jordan MD  8/27/2024

## 2024-09-02 ENCOUNTER — HOSPITAL ENCOUNTER (EMERGENCY)
Facility: HOSPITAL | Age: 49
Discharge: HOME | End: 2024-09-02
Attending: EMERGENCY MEDICINE
Payer: COMMERCIAL

## 2024-09-02 ENCOUNTER — APPOINTMENT (OUTPATIENT)
Dept: MRI IMAGING | Facility: HOSPITAL | Age: 49
End: 2024-09-02
Attending: EMERGENCY MEDICINE
Payer: COMMERCIAL

## 2024-09-02 VITALS
TEMPERATURE: 98 F | OXYGEN SATURATION: 98 % | BODY MASS INDEX: 30.73 KG/M2 | SYSTOLIC BLOOD PRESSURE: 122 MMHG | RESPIRATION RATE: 16 BRPM | HEART RATE: 73 BPM | WEIGHT: 180 LBS | HEIGHT: 64 IN | DIASTOLIC BLOOD PRESSURE: 72 MMHG

## 2024-09-02 DIAGNOSIS — G89.29 CHRONIC NONINTRACTABLE HEADACHE, UNSPECIFIED HEADACHE TYPE: Primary | ICD-10-CM

## 2024-09-02 DIAGNOSIS — R51.9 CHRONIC NONINTRACTABLE HEADACHE, UNSPECIFIED HEADACHE TYPE: Primary | ICD-10-CM

## 2024-09-02 LAB
ALBUMIN SERPL-MCNC: 4.9 G/DL (ref 3.2–4.8)
ALP LIVER SERPL-CCNC: 68 U/L
ALT SERPL-CCNC: 28 U/L
ANION GAP SERPL CALC-SCNC: 5 MMOL/L (ref 0–18)
AST SERPL-CCNC: 22 U/L (ref ?–34)
BASOPHILS # BLD AUTO: 0.03 X10(3) UL (ref 0–0.2)
BASOPHILS NFR BLD AUTO: 0.3 %
BILIRUB DIRECT SERPL-MCNC: 0.2 MG/DL (ref ?–0.3)
BILIRUB SERPL-MCNC: 0.9 MG/DL (ref 0.3–1.2)
BUN BLD-MCNC: 10 MG/DL (ref 9–23)
BUN/CREAT SERPL: 11.9 (ref 10–20)
CALCIUM BLD-MCNC: 9.5 MG/DL (ref 8.7–10.4)
CHLORIDE SERPL-SCNC: 105 MMOL/L (ref 98–112)
CO2 SERPL-SCNC: 29 MMOL/L (ref 21–32)
CREAT BLD-MCNC: 0.84 MG/DL
DEPRECATED RDW RBC AUTO: 38.7 FL (ref 35.1–46.3)
EGFRCR SERPLBLD CKD-EPI 2021: 85 ML/MIN/1.73M2 (ref 60–?)
EOSINOPHIL # BLD AUTO: 0.2 X10(3) UL (ref 0–0.7)
EOSINOPHIL NFR BLD AUTO: 1.9 %
ERYTHROCYTE [DISTWIDTH] IN BLOOD BY AUTOMATED COUNT: 12.2 % (ref 11–15)
GLUCOSE BLD-MCNC: 122 MG/DL (ref 70–99)
HCT VFR BLD AUTO: 44.3 %
HGB BLD-MCNC: 14.8 G/DL
IMM GRANULOCYTES # BLD AUTO: 0.03 X10(3) UL (ref 0–1)
IMM GRANULOCYTES NFR BLD: 0.3 %
LYMPHOCYTES # BLD AUTO: 1.8 X10(3) UL (ref 1–4)
LYMPHOCYTES NFR BLD AUTO: 17.2 %
MCH RBC QN AUTO: 29 PG (ref 26–34)
MCHC RBC AUTO-ENTMCNC: 33.4 G/DL (ref 31–37)
MCV RBC AUTO: 86.7 FL
MONOCYTES # BLD AUTO: 0.99 X10(3) UL (ref 0.1–1)
MONOCYTES NFR BLD AUTO: 9.5 %
NEUTROPHILS # BLD AUTO: 7.42 X10 (3) UL (ref 1.5–7.7)
NEUTROPHILS # BLD AUTO: 7.42 X10(3) UL (ref 1.5–7.7)
NEUTROPHILS NFR BLD AUTO: 70.8 %
OSMOLALITY SERPL CALC.SUM OF ELEC: 288 MOSM/KG (ref 275–295)
PLATELET # BLD AUTO: 298 10(3)UL (ref 150–450)
POTASSIUM SERPL-SCNC: 4.5 MMOL/L (ref 3.5–5.1)
PROT SERPL-MCNC: 7.7 G/DL (ref 5.7–8.2)
RBC # BLD AUTO: 5.11 X10(6)UL
SODIUM SERPL-SCNC: 139 MMOL/L (ref 136–145)
WBC # BLD AUTO: 10.5 X10(3) UL (ref 4–11)

## 2024-09-02 PROCEDURE — 80076 HEPATIC FUNCTION PANEL: CPT | Performed by: EMERGENCY MEDICINE

## 2024-09-02 PROCEDURE — 85025 COMPLETE CBC W/AUTO DIFF WBC: CPT | Performed by: EMERGENCY MEDICINE

## 2024-09-02 PROCEDURE — 93010 ELECTROCARDIOGRAM REPORT: CPT

## 2024-09-02 PROCEDURE — 80048 BASIC METABOLIC PNL TOTAL CA: CPT | Performed by: EMERGENCY MEDICINE

## 2024-09-02 PROCEDURE — 99285 EMERGENCY DEPT VISIT HI MDM: CPT

## 2024-09-02 PROCEDURE — 96374 THER/PROPH/DIAG INJ IV PUSH: CPT

## 2024-09-02 PROCEDURE — 96361 HYDRATE IV INFUSION ADD-ON: CPT

## 2024-09-02 PROCEDURE — 96375 TX/PRO/DX INJ NEW DRUG ADDON: CPT

## 2024-09-02 PROCEDURE — 70551 MRI BRAIN STEM W/O DYE: CPT | Performed by: EMERGENCY MEDICINE

## 2024-09-02 PROCEDURE — 93005 ELECTROCARDIOGRAM TRACING: CPT

## 2024-09-02 RX ORDER — PROCHLORPERAZINE MALEATE 10 MG
10 TABLET ORAL EVERY 6 HOURS PRN
Qty: 20 TABLET | Refills: 0 | Status: SHIPPED | OUTPATIENT
Start: 2024-09-02 | End: 2024-09-22

## 2024-09-02 RX ORDER — DIPHENHYDRAMINE HYDROCHLORIDE 50 MG/ML
50 INJECTION INTRAMUSCULAR; INTRAVENOUS ONCE
Status: COMPLETED | OUTPATIENT
Start: 2024-09-02 | End: 2024-09-02

## 2024-09-02 RX ORDER — KETOROLAC TROMETHAMINE 15 MG/ML
15 INJECTION, SOLUTION INTRAMUSCULAR; INTRAVENOUS ONCE
Status: COMPLETED | OUTPATIENT
Start: 2024-09-02 | End: 2024-09-02

## 2024-09-02 RX ORDER — PROCHLORPERAZINE EDISYLATE 5 MG/ML
10 INJECTION INTRAMUSCULAR; INTRAVENOUS ONCE
Status: COMPLETED | OUTPATIENT
Start: 2024-09-02 | End: 2024-09-02

## 2024-09-02 RX ORDER — KETOROLAC TROMETHAMINE 10 MG/1
10 TABLET, FILM COATED ORAL EVERY 6 HOURS PRN
Qty: 20 TABLET | Refills: 0 | Status: SHIPPED | OUTPATIENT
Start: 2024-09-02 | End: 2024-09-09

## 2024-09-02 NOTE — ED QUICK NOTES
Pt ambulatory to ED tx room 26 from triage, A&O x 4.  CC per triage note.  No focal neuro deficits noted.  Pt connected to cont ECG, pulse ox & BP.  Cart in low/locked position, side rails up x 2, call light w/ in reach.

## 2024-09-02 NOTE — ED INITIAL ASSESSMENT (HPI)
Pt presents to the ER with c/o dizziness and HA intermittently x 1 month. Pt states pain has been severe for last 30 mins    Neuro intact

## 2024-09-03 ENCOUNTER — PATIENT MESSAGE (OUTPATIENT)
Dept: INTERNAL MEDICINE CLINIC | Facility: CLINIC | Age: 49
End: 2024-09-03

## 2024-09-03 ENCOUNTER — TELEPHONE (OUTPATIENT)
Dept: NEUROLOGY | Facility: CLINIC | Age: 49
End: 2024-09-03

## 2024-09-03 DIAGNOSIS — G44.209 ACUTE NON INTRACTABLE TENSION-TYPE HEADACHE: Primary | ICD-10-CM

## 2024-09-03 LAB
ATRIAL RATE: 88 BPM
P AXIS: 36 DEGREES
P-R INTERVAL: 150 MS
Q-T INTERVAL: 380 MS
QRS DURATION: 92 MS
QTC CALCULATION (BEZET): 459 MS
R AXIS: 35 DEGREES
T AXIS: 34 DEGREES
VENTRICULAR RATE: 88 BPM

## 2024-09-03 NOTE — ED PROVIDER NOTES
Patient Seen in: [facility] Emergency Department    History     Chief Complaint   Patient presents with    Headache    Dizziness       HPI    The patient presents to the ED complaining of a headache for the last 1 month.  She states that headache waxes and wanes but has been persistent.  Not better with home medication.  Also complains of mild dizziness that has been ongoing as well.  Talked with her PCP who ordered MRI but is not scheduled until next week.  She states headache was unbearable tonight which caused her to come to the ER.  Denies other symptoms, no neck stiffness, fever, numbness or weakness to her extremities or other complaints.    History reviewed.   Past Medical History:    Allergic rhinitis    Anxiety    occasional    Artificial insemination    Back problem    DDD    Cancer (HCC)    non hodgkin's lymphoma    Cholecystitis    Laser cholecystectomy     Decorative tattoo    Degenerative lumbar disc    Degenerative L2 disc, Therapy    H/O pregnancy    EAB x 2-, ;  X 3- , ?, 2009    Helicobacter positive gastritis    Noted on EGD; no ulcer. On abx    High cholesterol    Hormone disorder    Hyperlipidemia    Infertility    Art insemination    Lipid screening    per NG    Obesity (BMI 30-39.9)    Obesity, unspecified    Polycystic ovaries    Artificial insemination/clomid    Screen for colon cancer    no adenomatous polyp; repeat CLN in 10 yrs    Tonsillitis    Tonsillectomy    Type 2 diabetes mellitus without complication, without long-term current use of insulin (HCC)       History reviewed.   Past Surgical History:   Procedure Laterality Date           X 3, , ?, 2009       &     Cholecystectomy      laser cholecystectomy    Colonoscopy  2019    Colonoscopy N/A 2024    Procedure: COLONOSCOPY;  Surgeon: Barrie Kaufman MD;  Location: [facility] ENDOSCOPY    Skin surgery      #2 moles removed on back    Tonsillectomy            Medications :  (Not in a hospital admission)       Family History   Problem Relation Age of Onset    Diabetes Father 65    Obesity Father     Hypertension Mother     High Cholesterol Mother     Diabetes Mother         Bord DM    Cancer Mother 55        Breast Cancer    Heart Disorder Mother         coronary stent    Breast Cancer Mother 55    Heart Attack Mother     Obesity Mother     Alcohol and Other Disorders Associated Maternal Grandmother         alcoholism    Cancer Maternal Grandmother         Cancer-lung, age 42 (cause of death)    Diabetes Maternal Grandfather     Other (No hyperlipidemia) Other     Cancer Self 46        non hodgkins lymphoma       Smoking Status:   Social History     Socioeconomic History    Marital status:    Tobacco Use    Smoking status: Former     Current packs/day: 0.00     Types: Cigarettes     Quit date: 1993     Years since quittin.4    Smokeless tobacco: Never   Vaping Use    Vaping status: Never Used   Substance and Sexual Activity    Alcohol use: Yes     Alcohol/week: 1.0 standard drink of alcohol     Types: 1 Standard drinks or equivalent per week     Comment: very occasional    Drug use: No    Sexual activity: Yes     Partners: Male     Birth control/protection: Vasectomy   Other Topics Concern    Caffeine Concern Yes     Comment: Coffee, 1 cup daily    Exercise Yes     Comment: walking daily    History of tanning Yes    Breast feeding No    Reaction to local anesthetic No       Constitutional and vital signs reviewed.      Social History and Family History elements reviewed from today, pertinent positives to the presenting problem noted.    Physical Exam     ED Triage Vitals [24 1832]   BP (!) 151/94   Pulse 79   Resp 18   Temp 98 °F (36.7 °C)   Temp src Oral   SpO2 99 %   O2 Device None (Room air)       All measures to prevent infection transmission during my interaction with the patient were taken. Handwashing was performed prior to and after  the exam.  Stethoscope and any equipment used during my examination was cleaned with super sani-cloth germicidal wipes following the exam.     Physical Exam  Vitals and nursing note reviewed.   Constitutional:       General: She is not in acute distress.     Appearance: She is obese. She is not ill-appearing or toxic-appearing.   HENT:      Head: Normocephalic and atraumatic.   Eyes:      General: No visual field deficit.        Right eye: No discharge.         Left eye: No discharge.      Conjunctiva/sclera: Conjunctivae normal.   Neck:      Trachea: No tracheal deviation.   Cardiovascular:      Rate and Rhythm: Normal rate.   Pulmonary:      Effort: Pulmonary effort is normal. No respiratory distress.   Abdominal:      General: There is no distension.      Palpations: Abdomen is soft.   Musculoskeletal:         General: No deformity.   Skin:     General: Skin is warm and dry.   Neurological:      Mental Status: She is alert and oriented to person, place, and time.      Cranial Nerves: No cranial nerve deficit, dysarthria or facial asymmetry.      Sensory: No sensory deficit.      Motor: No weakness.      Coordination: Romberg sign negative.      Gait: Gait normal.   Psychiatric:         Mood and Affect: Mood normal.         Behavior: Behavior normal.         ED Course        Labs Reviewed   BASIC METABOLIC PANEL (8) - Abnormal; Notable for the following components:       Result Value    Glucose 122 (*)     All other components within normal limits   HEPATIC FUNCTION PANEL (7) - Abnormal; Notable for the following components:    Albumin 4.9 (*)     All other components within normal limits   CBC WITH DIFFERENTIAL WITH PLATELET     EKG    Rate, intervals and axes as noted on EKG Report.  Rate: Normal, 88 bpm  Rhythm: Sinus Rhythm  Reading: Normal intervals, normal ST segments, normal EKG           As Interpreted by me    Imaging Results Available and Reviewed while in ED: MRI BRAIN WITHOUT CONTRAST    Comparison:  None      IMPRESSION:    No acute abnormality.    Diffusion images reveal no evidence of acute ischemic infarct.    No evidence of intracranial mass or hemorrhage or mass effect.    Visualized orbits and sinuses are unremarkable.    Report sent at 10:52 PM Eastern time.        Luke Gorman MD  ED Medications Administered:   Medications   sodium chloride 0.9 % IV bolus 1,000 mL (1,000 mL Intravenous New Bag 9/2/24 1946)   diphenhydrAMINE (Benadryl) 50 mg/mL  injection 50 mg (50 mg Intravenous Given 9/2/24 1946)   ketorolac (Toradol) 15 MG/ML injection 15 mg (15 mg Intravenous Given 9/2/24 1946)   prochlorperazine (Compazine) 10 MG/2ML injection 10 mg (10 mg Intravenous Given 9/2/24 1946)         MDM     Vitals:    09/02/24 1845 09/02/24 1954 09/02/24 2045 09/02/24 2145   BP: 131/81 141/90  148/86   Pulse: 84 92 72 76   Resp: 13 19 12 16   Temp:       TempSrc:       SpO2: 99% 96% 97% 96%   Weight:       Height:         *I personally reviewed and interpreted all ED vitals.    Pulse Ox: 96%, Room air, Normal     Monitor Interpretation:   normal sinus rhythm as interpreted by me.  The cardiac monitor was ordered to monitor heart rate.    Differential Diagnosis/ Diagnostic Considerations: Nonspecific headache, migraine headache, tension headache, brain mass, ICH, other    Complicating Factors: The patient already has does not have any pertinent problems on file. to contribute to the complexity of this ED evaluation.    Medical Decision Making  The patient presents to the ED with a chronic headache, nondistressed on examination, given a migraine cocktail and pain resolved.  MRI brain without acute findings.  Patient advised on outpatient neurology follow-up and will discharge home with pain control.    Problems Addressed:  Chronic nonintractable headache, unspecified headache type: chronic illness or injury with exacerbation, progression, or side effects of treatment that poses a threat to life or bodily  functions    Amount and/or Complexity of Data Reviewed  Labs: ordered. Decision-making details documented in ED Course.  Radiology: ordered and independent interpretation performed. Decision-making details documented in ED Course.     Details: I personally reviewed the patient's MRI brain and noted no ICH    Risk  Prescription drug management.        Condition upon leaving the department: Stable    Disposition and Plan     Clinical Impression:  1. Chronic nonintractable headache, unspecified headache type        Disposition:  Discharge    Follow-up:  Mayur Walton, DO  [address] [id number]  [facility] [address]  [phone]    Schedule an appointment as soon as possible for a visit in 1 week(s)        Medications Prescribed:  Current Discharge Medication List        START taking these medications    Details   Ketorolac Tromethamine 10 MG Oral Tab Take 1 tablet (10 mg total) by mouth every 6 (six) hours as needed for Pain.  Qty: 20 tablet, Refills: 0      prochlorperazine (COMPAZINE) 10 mg tablet Take 1 tablet (10 mg total) by mouth every 6 (six) hours as needed for Nausea.  Qty: 20 tablet, Refills: 0

## 2024-09-03 NOTE — TELEPHONE ENCOUNTER
Pt was seen in ed 9/2 advised to f/ up with dr adame. Pt scheduled for soonest appt mid November. Pls advise where to schedule pt.

## 2024-09-04 NOTE — TELEPHONE ENCOUNTER
Dr Jordan, please advise    MRI completed in the ER on 9/2/24    From: Azul Joseph  To: Weston Jordan  Sent: 9/3/2024 11:39 AM CDT  Subject: CT appt    I was at the ER yesterday as I had another horrible headache. I had scheduled a CT scan for next but had a MRI done last night. Do I still need to have CT scan done?     Future Appointments   Date Time Provider Department Center   9/6/2024  8:30 AM EM CC LAB1 [facility] CHEMO [facility]   9/6/2024  9:30 AM Trever Dunlap MD [facility] HEM ONC [facility]   9/11/2024  3:30 PM [facility] CT RM1 [facility] CT SCAN EM [facility]   10/4/2024 10:00 AM Wseton Jordan MD [facility]   11/22/2024  8:40 AM Mayur Walton DO [facility]   1/6/2025 12:15 PM Jt Tejeda MD [id number]        Patient Education        Learning About Birth Control: Intrauterine Device (IUD)  What is an intrauterine device (IUD)? The intrauterine device (IUD) is used to prevent pregnancy. It's a small, plastic, T-shaped device. Your doctor places the IUD in your uterus. If you and your doctor discuss it before you give birth, this can be done right after you have your baby. You have a choice between a hormonal IUD and a copper IUD. The hormonal IUD can prevent pregnancy for 3 to 6 years, depending on which IUD is used. But your doctor may talk to you about leaving it in for longer. When you have it, you don't have to do anything else to prevent pregnancy. The copper IUD can prevent pregnancy for 10 years. But your doctor may talk to you about leaving it in for longer. When you have it, you don't have to do anything else to prevent pregnancy. A string tied to the end of the IUD hangs down through the opening of the uterus (called the cervix) into the vagina. You can check that the IUD is in place by feeling for the string. The IUD usually stays in the uterus until your doctor removes it. How well does an IUD for birth control work? In the first year of use:  · When the hormonal IUD is used exactly as directed, fewer than 1 out of 100 women have an unplanned pregnancy. · When the copper IUD is used exactly as directed, fewer than 1 out of 100 women have an unplanned pregnancy. Be sure to tell your doctor about any health problems you have or medicines you take. Your doctor can help you choose the birth control method that's right for you. What are the advantages of an IUD? · An IUD is one of the most effective methods of birth control. · It prevents pregnancy for 3 to 10 years, depending on the type. You don't have to worry about birth control during this time. · It's safe to use while breastfeeding. · IUDs don't contain estrogen.  So you can use an IUD if you don't want to take estrogen or can't take estrogen because you have certain health problems or concerns. · An IUD is convenient. It is always providing birth control. You don't need to remember to take a pill or get a shot. You don't have to interrupt sex to protect against pregnancy. · A hormonal IUD may reduce heavy bleeding and cramping. What are the disadvantages of an IUD? · An IUD doesn't protect against sexually transmitted infections (STIs), such as herpes or HIV/AIDS. If you aren't sure if your sex partner might have an STI, use a condom to protect against disease. · A copper IUD may cause periods with more bleeding and cramping. · You have to see a doctor to have an IUD inserted and removed. Where can you learn more? Go to [email or web address].[email or web address] org and sign in to your [employer] account. Enter D113 in the MollyWatr box to learn more about \"Learning About Birth Control: Intrauterine Device (IUD). \"     If you do not have an account, please click on the \"Sign Up Now\" link. Current as of: October 8, 2020               Content Version: 12.8  © 1496-3771 Healthwise, Incorporated. Care instructions adapted under license by [facility] ([facility]). If you have questions about a medical condition or this instruction, always ask your healthcare professional. [address] any warranty or liability for your use of this information.

## 2024-09-05 DIAGNOSIS — C82.13 FOLLICULAR LYMPHOMA GRADE II OF INTRA-ABDOMINAL LYMPH NODES (HCC): Primary | ICD-10-CM

## 2024-09-05 DIAGNOSIS — Z08 ENCOUNTER FOR FOLLOW-UP SURVEILLANCE OF LYMPHOMA: ICD-10-CM

## 2024-09-05 DIAGNOSIS — Z85.72 ENCOUNTER FOR FOLLOW-UP SURVEILLANCE OF LYMPHOMA: ICD-10-CM

## 2024-09-05 RX ORDER — RIZATRIPTAN BENZOATE 10 MG/1
10 TABLET ORAL AS NEEDED
Qty: 10 TABLET | Refills: 1 | Status: SHIPPED | OUTPATIENT
Start: 2024-09-05

## 2024-09-05 NOTE — TELEPHONE ENCOUNTER
Dr. Jordan : please see patient's message.  SOLA--   looks like a message was sent to Dr. Walton asking him if he can see patient for ER follow up within a week and awaiting response (neuro Telephone Encounter)    Thank you

## 2024-09-05 NOTE — TELEPHONE ENCOUNTER
Patient is asking for next steps    Stated blood pressure yesterday 155/113, than later 118/89    Currently with headache 4/10 with Ketorolac, feels better than Monday    Denies: n/v blurred vision, dizziness, lightheaded    Was only given 20 tablets of Ketorolac q 6 hours    Please advise

## 2024-09-06 ENCOUNTER — NURSE ONLY (OUTPATIENT)
Dept: HEMATOLOGY/ONCOLOGY | Facility: HOSPITAL | Age: 49
End: 2024-09-06
Attending: INTERNAL MEDICINE
Payer: COMMERCIAL

## 2024-09-06 VITALS
RESPIRATION RATE: 18 BRPM | WEIGHT: 191.81 LBS | TEMPERATURE: 98 F | DIASTOLIC BLOOD PRESSURE: 77 MMHG | HEART RATE: 82 BPM | OXYGEN SATURATION: 97 % | HEIGHT: 63 IN | SYSTOLIC BLOOD PRESSURE: 135 MMHG | BODY MASS INDEX: 33.98 KG/M2

## 2024-09-06 DIAGNOSIS — Z08 ENCOUNTER FOR FOLLOW-UP SURVEILLANCE OF LYMPHOMA: ICD-10-CM

## 2024-09-06 DIAGNOSIS — Z95.828 PORT-A-CATH IN PLACE: ICD-10-CM

## 2024-09-06 DIAGNOSIS — C82.13 FOLLICULAR LYMPHOMA GRADE II OF INTRA-ABDOMINAL LYMPH NODES (HCC): Primary | ICD-10-CM

## 2024-09-06 DIAGNOSIS — Z85.72 ENCOUNTER FOR FOLLOW-UP SURVEILLANCE OF LYMPHOMA: ICD-10-CM

## 2024-09-06 DIAGNOSIS — C82.13 FOLLICULAR LYMPHOMA GRADE II OF INTRA-ABDOMINAL LYMPH NODES (HCC): ICD-10-CM

## 2024-09-06 LAB — LDH SERPL L TO P-CCNC: 208 U/L

## 2024-09-06 PROCEDURE — 99213 OFFICE O/P EST LOW 20 MIN: CPT | Performed by: INTERNAL MEDICINE

## 2024-09-06 PROCEDURE — 36591 DRAW BLOOD OFF VENOUS DEVICE: CPT

## 2024-09-06 PROCEDURE — 83615 LACTATE (LD) (LDH) ENZYME: CPT

## 2024-09-06 NOTE — PROGRESS NOTES
HPI     Azul Joseph is a 49 year old female here for follow up of   Encounter Diagnoses   Name Primary?    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    She is here for surveillance visit.    Was at the ED due to HA.  Started 1 month ago, for 3 weeks.  Period back at that time, and thought from hormones.  States that one week w/o HA.  States had HA after having intercourse and was severe.  States has tried OTCs for HA and not much of improvement.  Went to ED and had stroke protocol MRI negative.  BP per her report OK at home but at ED elevated.  She had Rx for Maxalt and has f/u with PCP.  Scheduled for November to see neurology.      Watching diet.  Lost weight with ozempic with weight loss clinic.    Denies any fevers, NS or involuntary weight loss.  No palpable LNs.   Port still in place but states not tender.  Some discomfort at R neck when turning it by port line site.      LMP 11/01/23.    ECOG PS 0      Review of Systems:     Review of Systems   Constitutional:  Positive for diaphoresis (with hot flashes). Negative for appetite change, fatigue, fever and unexpected weight change.   Respiratory:  Negative for cough and shortness of breath.    Cardiovascular:  Negative for chest pain.   Gastrointestinal:  Negative for abdominal pain.   Endocrine: Positive for hot flashes.   Musculoskeletal:  Positive for neck pain (back of head tight and neck too). Negative for arthralgias and back pain.        Bone pains.   Neurological:  Positive for dizziness and headaches.   Hematological:  Negative for adenopathy.   Psychiatric/Behavioral:  Positive for sleep disturbance.             Current Outpatient Medications   Medication Sig Dispense Refill    Rizatriptan Benzoate (MAXALT) 10 MG Oral Tab Take 1  tablet (10 mg total) by mouth as needed for Migraine. Do not dosages within 2 hours of each other. 10 tablet 1    Ketorolac Tromethamine 10 MG Oral Tab Take 1 tablet (10 mg total) by mouth every 6 (six) hours as needed for Pain. 20 tablet 0    prochlorperazine (COMPAZINE) 10 mg tablet Take 1 tablet (10 mg total) by mouth every 6 (six) hours as needed for Nausea. 20 tablet 0    butalbital-acetaminophen-caffeine -40 MG Oral Tab Take 1 tablet by mouth every 6 (six) hours as needed for Pain. 30 tablet 0    semaglutide (OZEMPIC, 0.25 OR 0.5 MG/DOSE,) 2 MG/3ML Subcutaneous Solution Pen-injector Inject 0.5 mg into the skin once a week. 3 mL 3     Allergies:   Allergies   Allergen Reactions    Latex RASH and ITCHING     redness       Past Medical History:    Allergic rhinitis    Anxiety    occasional    Artificial insemination    Back problem    DDD    Cancer (HCC)    non hodgkin's lymphoma    Cholecystitis    Laser cholecystectomy     Decorative tattoo    Degenerative lumbar disc    Degenerative L2 disc, Therapy    H/O pregnancy    EAB x 2-1989;  X 3- , ?, 2009    Helicobacter positive gastritis    Noted on EGD; no ulcer. On abx    High cholesterol    Hormone disorder    Hyperlipidemia    Infertility    Art insemination    Lipid screening    per NG    Obesity (BMI 30-39.9)    Obesity, unspecified    Polycystic ovaries    Artificial insemination/clomid    Screen for colon cancer    no adenomatous polyp; repeat CLN in 10 yrs    Tonsillitis    Tonsillectomy    Type 2 diabetes mellitus without complication, without long-term current use of insulin (HCC)     Past Surgical History:   Procedure Laterality Date           X 3, , ?,        &     Cholecystectomy      laser cholecystectomy    Colonoscopy  2019    Colonoscopy N/A 2024    Procedure: COLONOSCOPY;  Surgeon: Barrie Kaufman MD;  Location: [facility] ENDOSCOPY    Skin surgery      #2 moles removed on  back    Tonsillectomy       Social History     Socioeconomic History    Marital status:    Tobacco Use    Smoking status: Former     Current packs/day: 0.00     Types: Cigarettes     Quit date: 1993     Years since quittin.4    Smokeless tobacco: Never   Vaping Use    Vaping status: Never Used   Substance and Sexual Activity    Alcohol use: Yes     Alcohol/week: 1.0 standard drink of alcohol     Types: 1 Standard drinks or equivalent per week     Comment: very occasional    Drug use: No    Sexual activity: Yes     Partners: Male     Birth control/protection: Vasectomy   Other Topics Concern    Caffeine Concern Yes     Comment: Coffee, 1 cup daily    Exercise Yes     Comment: walking daily    History of tanning Yes    Breast feeding No    Reaction to local anesthetic No       Family History   Problem Relation Age of Onset    Diabetes Father 65    Obesity Father     Hypertension Mother     High Cholesterol Mother     Diabetes Mother         Bord DM    Cancer Mother 55        Breast Cancer    Heart Disorder Mother         coronary stent    Breast Cancer Mother 55    Heart Attack Mother     Obesity Mother     Alcohol and Other Disorders Associated Maternal Grandmother         alcoholism    Cancer Maternal Grandmother         Cancer-lung, age 42 (cause of death)    Diabetes Maternal Grandfather     Other (No hyperlipidemia) Other     Cancer Self 46        non hodgkins lymphoma         PHYSICAL EXAM:    /77 (BP Location: Left arm, Patient Position: Sitting, Cuff Size: large)   Pulse 82   Temp 97.8 °F (36.6 °C) (Oral)   Resp 18   Ht 1.6 m (5' 3\")   Wt 87 kg (191 lb 12.8 oz)   LMP 2024 (Exact Date)   SpO2 97%   BMI 33.98 kg/m²   Wt Readings from Last 6 Encounters:   24 87 kg (191 lb 12.8 oz)   24 81.6 kg (180 lb)   08/15/24 87.5 kg (193 lb)   24 88.5 kg (195 lb 2 oz)   24 88.9 kg (196 lb)   24 93.3 kg (205 lb 9.6 oz)     Physical Exam  General: Patient is  alert, not in acute distress.  HEENT: EOMs intact. PERRL.   Neck: No JVD. No palpable lymphadenopathy. Neck is supple.  Chest: Clear to auscultation.  Port R chest  Heart: Regular rate and rhythm.   Abdomen: Soft, non tender with good bowel sounds.  Extremities: No edema.  Neurological: Grossly intact.   Lymphatics: There is no palpable lymphadenopathy throughout in the cervical, supraclavicular, axillary, or inguinal regions.  Psych/Depression: nl        ASSESSMENT/PLAN:     1. 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    Discussed with the patient that there is discordance in the pathology report and the level of activity on these keisha basins seen on the PET/CT.  Discussed that the level of activity on PET/CT is more suggestive of a high-grade lymphoma.    Discussed that the therapies for low-grade and high-grade lymphoma are markedly different, as well as he prognosis is different for these 2.    Discussed that in order to determine if this is a high-grade lymphoma, recommend an excisional biopsy.  This would be very difficult to diagnose with another image guided biopsy.  Discussed that there was an area of necrosis seen on the image guided core biopsy, which is feature usually associated with high-grade lymphoma.    She was referred to Dr. Mendoza from surgical oncology for a robotic guided biopsy of the mesenteric lymph nodes.  This was performed on 1/10/2022.    Discussed with patient that fortunately this is still consistent with a grade 1-2 follicular lymphoma.    Discussed with the patient that  given diagnoses been confirmed as a low-grade follicular lymphoma, we will proceed with treatment with Bendamustine and rituximab.      Her FLIPI score is 0.  Discussed with the patient that this low risk group with the addition of rituximab to treatment plan, had a 2-year overall survival of 98%, 2-year progression free survival 84% and a median progression free survival of 84 months      S/p cycle 6 of BR in August 2022.    Patient with evidence of complete response on PET/CT prior [place] criteria.    Discussed on maintenance therapy with Rituxan for 2 years.  However, on updated NCCN guideline 4.2022 the state that maintenance therapy is suggested. Pt considered and declinined maintenance, based on data discussed and guidelines.     On surveillance as per NCCN guidelines with CBC, CMP and LDH every 3 months for 2 yrs and CT scan every 6 months for 2 yrs (from August, 2022 to August, 2024).      Yrs 3-5 (until August of 2027) labs q 6 mo, then yearly.  H+P each visit.    CT in February 2024 AMANDA.  Last CT in August 2024, AMANDA.  This will complete her imaging and then imaging will be as needed for symptom.    May have port removed      Return in about 6 months (around 3/6/2025) for surveillance follow up with labs.      MDM low    No orders of the defined types were placed in this encounter.      Results From Past 48 Hours:  Recent Results (from the past 48 hour(s))   LDH [E]    Collection Time: 09/06/24  8:28 AM   Result Value Ref Range     120-246 U/L U/L     Component      Latest Ref Rng 6/5/2024 9/2/2024 9/6/2024   WBC      4.0 - 11.0 x10(3) uL 4.9  10.5     RBC      3.80 - 5.30 x10(6)uL 5.02  5.11     Hemoglobin      12.0 - 16.0 g/dL 14.3  14.8     Hematocrit      35.0 - 48.0 % 42.8  44.3     MCV      80.0 - 100.0 fL 85.3  86.7     MCH      26.0 - 34.0 pg 28.5  29.0     MCHC      31.0 - 37.0 g/dL 33.4  33.4     RDW-SD      35.1 - 46.3 fL 38.7  38.7     RDW      11.0 - 15.0 % 12.6  12.2     Platelet  Count      150.0 - 450.0 10(3)uL 270.0  298.0     Prelim Neutrophil Abs      1.50 - 7.70 x10 (3) uL 2.47  7.42     Neutrophils Absolute      1.50 - 7.70 x10(3) uL 2.47  7.42     Lymphocytes Absolute      1.00 - 4.00 x10(3) uL 1.81  1.80     Monocytes Absolute      0.10 - 1.00 x10(3) uL 0.51  0.99     Eosinophils Absolute      0.00 - 0.70 x10(3) uL 0.11  0.20     Basophils Absolute      0.00 - 0.20 x10(3) uL 0.03  0.03     Immature Granulocyte Absolute      0.00 - 1.00 x10(3) uL 0.01  0.03     Neutrophils %      % 50.1  70.8     Lymphocytes %      % 36.6  17.2     Monocytes %      % 10.3  9.5     Eosinophils %      % 2.2  1.9     Basophils %      % 0.6  0.3     Immature Granulocyte %      % 0.2  0.3     Glucose      70 - 99 mg/dL 99  122 (H)     Sodium      136 - 145 mmol/L 142  139     Potassium      3.5 - 5.1 mmol/L 4.0  4.5     Chloride      98 - 112 mmol/L 111  105     Carbon Dioxide, Total      21.0 - 32.0 mmol/L 26.0  29.0     ANION GAP      0 - 18 mmol/L 5  5     BUN      9 - 23 mg/dL 11  10     CREATININE      0.55 - 1.02 mg/dL 0.77  0.84     BUN/CREATININE RATIO      10.0 - 20.0  14.3  11.9     CALCIUM      8.7 - 10.4 mg/dL 8.9  9.5     CALCULATED OSMOLALITY      275 - 295 mOsm/kg 293  288     EGFR      >=60 mL/min/1.73m2 95  85     ALT (SGPT)      10 - 49 U/L 31  28     AST (SGOT)      <34 U/L 21  22     ALKALINE PHOSPHATASE      39 - 100 U/L 63  68     Total Bilirubin      0.3 - 1.2 mg/dL 1.0  0.9     PROTEIN, TOTAL      5.7 - 8.2 g/dL 7.2  7.7     Albumin      3.2 - 4.8 g/dL 4.5  4.9 (H)     Globulin      2.0 - 3.5 g/dL 2.7      A/G Ratio      1.0 - 2.0  1.7      Patient Fasting for CMP? Patient not present      Bilirubin, Direct      <=0.3 mg/dL  0.2     LDH      120-246 U/L U/L 216   208        Imaging & Referrals:  None   PROCEDURE: CT CHEST ABDOMEN PELVIS (ALL CONTRAST ONLY) (CPT=71260/09601)      COMPARISON: [facility], PET STANDARD BODY SCAN (CPT=78815), 7/25/2022, 9:01 AM.   [facility], CT CHEST+ABDOMEN+PELVIS(ALL CNTRST ONLY)(CPT=71260/25760), 2/26/2024, 12:59 PM.  [facility], CT   CHEST+ABDOMEN+PELVIS(ALL CNTRST ONLY)(CPT=71260/94141), 8/10/2023, 9:20 AM.      INDICATIONS: Z08 Encounter for follow-up examination after completed treatment for malignant neoplasm C82.13 Follicular lymphoma grade II of intra-abdominal lymph nodes (HC*      TECHNIQUE:   CT images of the chest, abdomen and pelvis were obtained with intravenous contrast material.  Automated exposure control for dose reduction was used. Adjustment of the mA and/or kV was done based on the patient's size. Use of iterative   reconstruction technique for dose reduction was used.  Dose information is transmitted to the ACR (American College of Radiology) NRDR (National Radiology Data Registry) which includes the Dose Index Registry.      FINDINGS:   DEVICES: A right-sided subcutaneous chest port is again noted with tip again terminating at the cavoatrial junction.   CARDIAC: The heart is not enlarged.  There are no significant coronary artery calcifications.  There is no pericardial effusion.   VASCULATURE: The thoracic aorta has unremarkable configuration without aneurysm or dissection.    LUNGS/PLEURA: Mild dependent opacities at the lung bases, thought to represent subsegmental atelectasis.  No pneumothorax or pleural effusion.   AIRWAYS: The tracheobronchial tree is without central mass or obstructing lesion.      MEDIASTINUM/EMILY: No mass or lymphadenopathy.    CHEST WALL: No axillary mass or lymphadenopathy.       LIVER: The liver is again mildly enlarged and demonstrates diffuse steatosis, grossly unchanged.  There are no discrete liver lesions.   BILIARY: The gallbladder is again noted to be surgically absent.  There is no biliary ductal dilatation.   PANCREAS: No lesion, fluid collection, ductal dilatation, or atrophy.    SPLEEN: No enlargement.    ADRENALS:   No defined mass or abnormal  enlargement.    KIDNEYS:   Mild cortical scarring is noted in the medial aspect of the superior left renal pole.  The kidneys otherwise enhance symmetrically without hydronephrosis or suspicious lesions.      GI/MESENTERY: Portions of the bowel are underdistended and not well assessed.  There is a large amount of stool throughout the colon.  No bowel wall thickening or obstruction.  The appendix is normal.   A few scattered nonenlarged mesenteric lymph nodes are similar to prior.   URINARY BLADDER: No visible calculus or focal wall thickening.    PELVIC NODES: No lymphadenopathy.     PELVIC ORGANS: The uterus is present.  Continue decreased size of a 1.5, previously 2.8 cm left adnexal cystic lesion, consistent with benign evolution.  Multiple nabothian cysts are again seen in the cervix.   VASCULATURE:   No aneurysm is detected.   RETROPERITONEUM: No mass or lymphadenopathy is apparent.    BONES:   No significant bony lesion or fracture.  There are 6 lumbar type vertebrae with redemonstration of transitional vertebral anatomy at the lumbosacral junction bilaterally.  Partial butterfly morphology of the T5 and T11 vertebral bodies is again   noted.  Mild spondylotic changes are again seen throughout the thoracolumbar spine.   ABDOMINAL WALL: There is stable transverse scarring in the anterior pelvic wall, likely related to a previous Caesarean section.  A minute fat containing umbilical hernia is unchanged.   OTHER: No free air or fluid is seen in the abdomen or pelvis.                      Impression  CONCLUSION:      History of follicular lymphoma.  No lymphadenopathy in the chest, abdomen, or pelvis, similar to the 02/26/2024 examination.      Hepatomegaly with diffuse hepatic steatosis, unchanged.      Continue decreased size of a left ovarian cystic lesion, most compatible with a benign etiology.             Dictated by (CST): Nelly Eldridge MD on 8/26/2024 at 12:01 PM       Finalized by (CST): Jazmyn  MD Nelly on 8/26/2024 at 12:11 PM

## 2024-09-06 NOTE — TELEPHONE ENCOUNTER
From  Weston Jordan MD To  Azul Salazar Sent and Delivered  9/5/2024  4:55 PM   Last Read in MyChart  9/5/2024  4:59 PM by Azul Joseph

## 2024-09-09 VITALS — HEIGHT: 63 IN | BODY MASS INDEX: 31.89 KG/M2 | WEIGHT: 180 LBS

## 2024-09-09 RX ORDER — SODIUM CHLORIDE 9 MG/ML
INJECTION, SOLUTION INTRAVENOUS CONTINUOUS
Status: CANCELLED | OUTPATIENT
Start: 2024-09-09

## 2024-10-04 ENCOUNTER — LAB ENCOUNTER (OUTPATIENT)
Dept: LAB | Age: 49
End: 2024-10-04
Attending: INTERNAL MEDICINE
Payer: COMMERCIAL

## 2024-10-04 ENCOUNTER — OFFICE VISIT (OUTPATIENT)
Dept: INTERNAL MEDICINE CLINIC | Facility: CLINIC | Age: 49
End: 2024-10-04
Payer: COMMERCIAL

## 2024-10-04 VITALS
DIASTOLIC BLOOD PRESSURE: 76 MMHG | RESPIRATION RATE: 18 BRPM | OXYGEN SATURATION: 96 % | BODY MASS INDEX: 34.2 KG/M2 | WEIGHT: 193 LBS | TEMPERATURE: 98 F | HEIGHT: 63 IN | SYSTOLIC BLOOD PRESSURE: 102 MMHG | HEART RATE: 91 BPM

## 2024-10-04 DIAGNOSIS — E78.00 PURE HYPERCHOLESTEROLEMIA: ICD-10-CM

## 2024-10-04 DIAGNOSIS — Z00.00 ANNUAL PHYSICAL EXAM: Primary | ICD-10-CM

## 2024-10-04 DIAGNOSIS — E11.9 TYPE 2 DIABETES MELLITUS WITHOUT COMPLICATION, WITHOUT LONG-TERM CURRENT USE OF INSULIN (HCC): ICD-10-CM

## 2024-10-04 DIAGNOSIS — E66.09 CLASS 1 OBESITY DUE TO EXCESS CALORIES WITHOUT SERIOUS COMORBIDITY WITH BODY MASS INDEX (BMI) OF 34.0 TO 34.9 IN ADULT: ICD-10-CM

## 2024-10-04 DIAGNOSIS — C82.13 FOLLICULAR LYMPHOMA GRADE II OF INTRA-ABDOMINAL LYMPH NODES (HCC): ICD-10-CM

## 2024-10-04 DIAGNOSIS — E28.2 POLYCYSTIC OVARIES: ICD-10-CM

## 2024-10-04 DIAGNOSIS — E66.811 CLASS 1 OBESITY DUE TO EXCESS CALORIES WITHOUT SERIOUS COMORBIDITY WITH BODY MASS INDEX (BMI) OF 34.0 TO 34.9 IN ADULT: ICD-10-CM

## 2024-10-04 DIAGNOSIS — Z12.11 COLON CANCER SCREENING: ICD-10-CM

## 2024-10-04 DIAGNOSIS — Z00.00 ANNUAL PHYSICAL EXAM: ICD-10-CM

## 2024-10-04 PROBLEM — T80.90XA INFUSION REACTION: Status: RESOLVED | Noted: 2022-03-11 | Resolved: 2024-10-04

## 2024-10-04 PROBLEM — F43.9 STRESS: Status: RESOLVED | Noted: 2023-11-20 | Resolved: 2024-10-04

## 2024-10-04 PROBLEM — M54.59 MECHANICAL LOW BACK PAIN: Status: RESOLVED | Noted: 2017-02-14 | Resolved: 2024-10-04

## 2024-10-04 PROBLEM — M79.671 FOOT PAIN, BILATERAL: Status: RESOLVED | Noted: 2022-10-12 | Resolved: 2024-10-04

## 2024-10-04 PROBLEM — E78.1 HYPERTRIGLYCERIDEMIA: Status: RESOLVED | Noted: 2023-02-27 | Resolved: 2024-10-04

## 2024-10-04 PROBLEM — L29.0 ANAL PRURITUS: Status: RESOLVED | Noted: 2019-03-21 | Resolved: 2024-10-04

## 2024-10-04 PROBLEM — M79.672 FOOT PAIN, BILATERAL: Status: RESOLVED | Noted: 2022-10-12 | Resolved: 2024-10-04

## 2024-10-04 PROBLEM — E88.819 INSULIN RESISTANCE: Status: RESOLVED | Noted: 2023-02-27 | Resolved: 2024-10-04

## 2024-10-04 PROBLEM — M99.9 BIOMECHANICAL LESION: Status: RESOLVED | Noted: 2023-02-28 | Resolved: 2024-10-04

## 2024-10-04 PROBLEM — K62.5 RECTAL BLEEDING: Status: RESOLVED | Noted: 2019-03-21 | Resolved: 2024-10-04

## 2024-10-04 PROBLEM — R06.83 SNORING: Status: RESOLVED | Noted: 2017-02-14 | Resolved: 2024-10-04

## 2024-10-04 PROBLEM — R10.30 LOWER ABDOMINAL PAIN: Status: RESOLVED | Noted: 2019-03-21 | Resolved: 2024-10-04

## 2024-10-04 LAB
ALBUMIN SERPL-MCNC: 4.6 G/DL (ref 3.2–4.8)
ALBUMIN/GLOB SERPL: 1.6 {RATIO} (ref 1–2)
ALP LIVER SERPL-CCNC: 68 U/L
ALT SERPL-CCNC: 34 U/L
ANION GAP SERPL CALC-SCNC: 7 MMOL/L (ref 0–18)
AST SERPL-CCNC: 25 U/L (ref ?–34)
BASOPHILS # BLD AUTO: 0.03 X10(3) UL (ref 0–0.2)
BASOPHILS NFR BLD AUTO: 0.5 %
BILIRUB SERPL-MCNC: 0.9 MG/DL (ref 0.3–1.2)
BUN BLD-MCNC: 13 MG/DL (ref 9–23)
BUN/CREAT SERPL: 15.3 (ref 10–20)
CALCIUM BLD-MCNC: 9.7 MG/DL (ref 8.7–10.4)
CHLORIDE SERPL-SCNC: 107 MMOL/L (ref 98–112)
CHOLEST SERPL-MCNC: 231 MG/DL (ref ?–200)
CO2 SERPL-SCNC: 27 MMOL/L (ref 21–32)
CREAT BLD-MCNC: 0.85 MG/DL
CREAT UR-SCNC: 148.6 MG/DL
DEPRECATED RDW RBC AUTO: 39.3 FL (ref 35.1–46.3)
EGFRCR SERPLBLD CKD-EPI 2021: 83 ML/MIN/1.73M2 (ref 60–?)
EOSINOPHIL # BLD AUTO: 0.16 X10(3) UL (ref 0–0.7)
EOSINOPHIL NFR BLD AUTO: 2.7 %
ERYTHROCYTE [DISTWIDTH] IN BLOOD BY AUTOMATED COUNT: 12.3 % (ref 11–15)
EST. AVERAGE GLUCOSE BLD GHB EST-MCNC: 120 MG/DL (ref 68–126)
FASTING PATIENT LIPID ANSWER: YES
FASTING STATUS PATIENT QL REPORTED: YES
GLOBULIN PLAS-MCNC: 2.8 G/DL (ref 2–3.5)
GLUCOSE BLD-MCNC: 84 MG/DL (ref 70–99)
HBA1C MFR BLD: 5.8 % (ref ?–5.7)
HCT VFR BLD AUTO: 44.7 %
HDLC SERPL-MCNC: 50 MG/DL (ref 40–59)
HGB BLD-MCNC: 14.6 G/DL
IMM GRANULOCYTES # BLD AUTO: 0.01 X10(3) UL (ref 0–1)
IMM GRANULOCYTES NFR BLD: 0.2 %
LDLC SERPL CALC-MCNC: 151 MG/DL (ref ?–100)
LYMPHOCYTES # BLD AUTO: 2.02 X10(3) UL (ref 1–4)
LYMPHOCYTES NFR BLD AUTO: 34.6 %
MCH RBC QN AUTO: 28.3 PG (ref 26–34)
MCHC RBC AUTO-ENTMCNC: 32.7 G/DL (ref 31–37)
MCV RBC AUTO: 86.6 FL
MICROALBUMIN UR-MCNC: <0.3 MG/DL
MONOCYTES # BLD AUTO: 0.56 X10(3) UL (ref 0.1–1)
MONOCYTES NFR BLD AUTO: 9.6 %
NEUTROPHILS # BLD AUTO: 3.05 X10 (3) UL (ref 1.5–7.7)
NEUTROPHILS # BLD AUTO: 3.05 X10(3) UL (ref 1.5–7.7)
NEUTROPHILS NFR BLD AUTO: 52.4 %
NONHDLC SERPL-MCNC: 181 MG/DL (ref ?–130)
OSMOLALITY SERPL CALC.SUM OF ELEC: 291 MOSM/KG (ref 275–295)
PLATELET # BLD AUTO: 299 10(3)UL (ref 150–450)
POTASSIUM SERPL-SCNC: 5.2 MMOL/L (ref 3.5–5.1)
PROT SERPL-MCNC: 7.4 G/DL (ref 5.7–8.2)
RBC # BLD AUTO: 5.16 X10(6)UL
SODIUM SERPL-SCNC: 141 MMOL/L (ref 136–145)
TRIGL SERPL-MCNC: 165 MG/DL (ref 30–149)
TSI SER-ACNC: 1.98 MIU/ML (ref 0.55–4.78)
VLDLC SERPL CALC-MCNC: 32 MG/DL (ref 0–30)
WBC # BLD AUTO: 5.8 X10(3) UL (ref 4–11)

## 2024-10-04 PROCEDURE — 83036 HEMOGLOBIN GLYCOSYLATED A1C: CPT

## 2024-10-04 PROCEDURE — 82570 ASSAY OF URINE CREATININE: CPT

## 2024-10-04 PROCEDURE — 82043 UR ALBUMIN QUANTITATIVE: CPT

## 2024-10-04 PROCEDURE — 85025 COMPLETE CBC W/AUTO DIFF WBC: CPT

## 2024-10-04 PROCEDURE — 84443 ASSAY THYROID STIM HORMONE: CPT

## 2024-10-04 PROCEDURE — 80061 LIPID PANEL: CPT

## 2024-10-04 PROCEDURE — 80053 COMPREHEN METABOLIC PANEL: CPT

## 2024-10-04 PROCEDURE — 36415 COLL VENOUS BLD VENIPUNCTURE: CPT

## 2024-10-04 NOTE — PROGRESS NOTES
Patient ID: Azul Joseph is a 49 year old female.  Chief Complaint   Patient presents with    Physical        HISTORY OF PRESENT ILLNESS:   HPI  Patient presents for above.  Here for her annual physical exam.    History of hypercholesterolemia on rosuvastatin.  Compliant with medications.  Needs levels rechecked.    Has been seeing a weight loss specialist.  She was found to have an A1c of 6.3 and was classified as diabetic.  She is currently on Ozempic for diabetes and weight loss purposes.  Initially did lose significant weight on Ozempic but has not plateaued and gained a few pounds back.    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  She underwent chemotherapy with very favorable results.  One side effect it appears is that she has neuropathy of her feet.  She is also on her feet for much of the day as she is a .  Neuropathy has improved over time.  She is doing very well from lymphoma standpoint.  She will be having her port removed soon and the frequency of visits with her hematologist will be decreased to twice a year.    History of polycystic ovaries.  Was taking metformin many years ago but no longer doing so.  She does follow with a local gynecologist.     She did have a colonoscopy in 2024 with repeat to be done in 2031.  Had mammogram in 2024 with repeat to be done in 1 year.  Gets orders from her gynecologist.  Last Pap smear was in 2022.  Does not want any other age-appropriate vaccinations.    Review of Systems  Ten point review of systems otherwise negative with the exception of HPI and assessment and plan.    MEDICAL HISTORY:     Past Medical History:    Allergic rhinitis    Anxiety    occasional    Artificial insemination    Back problem    DDD    Cancer (HCC)    non hodgkin's lymphoma    Cholecystitis    Laser cholecystectomy 2011    Decorative tattoo    Degenerative lumbar disc    Degenerative L2 disc, Therapy    H/O pregnancy    EAB x 2-1988, 1989;   X 3- , ?, 2009    Helicobacter positive gastritis    Noted on EGD; no ulcer. On abx    High cholesterol    Hormone disorder    Hyperlipidemia    Infertility    Art insemination    Lipid screening    per NG    Obesity (BMI 30-39.9)    Obesity, unspecified    Polycystic ovaries    Artificial insemination/clomid    Screen for colon cancer    no adenomatous polyp; repeat CLN in 10 yrs    Tonsillitis    Tonsillectomy    Type 2 diabetes mellitus without complication, without long-term current use of insulin (HCC)       Past Surgical History:   Procedure Laterality Date           X 3, , ?,        &     Cholecystectomy      laser cholecystectomy    Colonoscopy      Colonoscopy N/A 2024    Procedure: COLONOSCOPY;  Surgeon: Barrie Kaufman MD;  Location: [facility] ENDOSCOPY    Skin surgery      #2 moles removed on back    Tonsillectomy           Current Outpatient Medications:     Magnesium 100 MG Oral Tab, Take 1.65 tablets (165 mg total) by mouth., Disp: , Rfl:     Rizatriptan Benzoate (MAXALT) 10 MG Oral Tab, Take 1 tablet (10 mg total) by mouth as needed for Migraine. Do not dosages within 2 hours of each other., Disp: 10 tablet, Rfl: 1    butalbital-acetaminophen-caffeine -40 MG Oral Tab, Take 1 tablet by mouth every 6 (six) hours as needed for Pain., Disp: 30 tablet, Rfl: 0    semaglutide (OZEMPIC, 0.25 OR 0.5 MG/DOSE,) 2 MG/3ML Subcutaneous Solution Pen-injector, Inject 0.5 mg into the skin once a week., Disp: 3 mL, Rfl: 3    Allergies:  Allergies   Allergen Reactions    Latex RASH and ITCHING     redness       Social History     Socioeconomic History    Marital status:      Spouse name: Not on file    Number of children: Not on file    Years of education: Not on file    Highest education level: Not on file   Occupational History    Not on file   Tobacco Use    Smoking status: Former     Current packs/day: 0.00     Types: Cigarettes     Quit  date: 1993     Years since quittin.5    Smokeless tobacco: Never   Vaping Use    Vaping status: Never Used   Substance and Sexual Activity    Alcohol use: Yes     Alcohol/week: 1.0 standard drink of alcohol     Types: 1 Standard drinks or equivalent per week     Comment: very occasional    Drug use: No    Sexual activity: Yes     Partners: Male     Birth control/protection: Vasectomy   Other Topics Concern     Service Not Asked    Blood Transfusions Not Asked    Caffeine Concern Yes     Comment: Coffee, 1 cup daily    Occupational Exposure Not Asked    Hobby Hazards Not Asked    Sleep Concern Not Asked    Stress Concern Not Asked    Weight Concern Not Asked    Special Diet Not Asked    Back Care Not Asked    Exercise Yes     Comment: walking daily    Bike Helmet Not Asked    Seat Belt Not Asked    Self-Exams Not Asked    Grew up on a farm Not Asked    History of tanning Yes    Outdoor occupation Not Asked    Pt has a pacemaker Not Asked    Pt has a defibrillator Not Asked    Breast feeding No    Reaction to local anesthetic No   Social History Narrative    Not on file     Social Determinants of Health     Financial Resource Strain: Not on file   Food Insecurity: Not on file   Transportation Needs: Not on file   Physical Activity: Not on file   Stress: Not on file   Social Connections: Not on file   Housing Stability: Not on file           PHYSICAL EXAM:     Vitals:    10/04/24 0949   BP: 102/76   Pulse: 91   Resp: 18   Temp: 97.5 °F (36.4 °C)   TempSrc: Temporal   SpO2: 96%   Weight: 193 lb (87.5 kg)   Height: 5' 3\" (1.6 m)       Body mass index is 34.19 kg/m².    Physical Exam  Constitutional:       Appearance: Normal appearance.   HENT:      Head: Normocephalic and atraumatic.      Right Ear: External ear normal.      Left Ear: External ear normal.      Nose: Nose normal.      Mouth/Throat:      Mouth: Mucous membranes are moist.      Pharynx: Oropharynx is clear.   Eyes:      General: No scleral  icterus.        Right eye: No discharge.         Left eye: No discharge.      Extraocular Movements: Extraocular movements intact.      Conjunctiva/sclera: Conjunctivae normal.      Pupils: Pupils are equal, round, and reactive to light.   Cardiovascular:      Rate and Rhythm: Normal rate and regular rhythm.      Pulses: Normal pulses.      Heart sounds: Normal heart sounds. No murmur heard.     No friction rub. No gallop.   Pulmonary:      Effort: Pulmonary effort is normal. No respiratory distress.      Breath sounds: No stridor. No wheezing or rales.   Abdominal:      General: Abdomen is flat. Bowel sounds are normal. There is no distension.      Palpations: There is no mass.      Tenderness: There is no abdominal tenderness. There is no guarding.   Genitourinary:     Comments: Exam deferred to patient's gynecologist.  Musculoskeletal:         General: No swelling. Normal range of motion.      Cervical back: Normal range of motion.      Right lower leg: No edema.      Left lower leg: No edema.   Skin:     General: Skin is warm.   Neurological:      General: No focal deficit present.      Mental Status: She is alert.   Psychiatric:         Mood and Affect: Mood normal.         Behavior: Behavior normal.           ASSESSMENT/PLAN:   1. Annual physical exam  CBC With Differential With Platelet; Future  Comp Metabolic Panel (14); Future  Lipid Panel; Future  TSH W Reflex To Free T4; Future    2. Pure hypercholesterolemia  Comp Metabolic Panel (14); Future  Lipid Panel; Future    3. Class 1 obesity due to excess calories without serious comorbidity with body mass index (BMI) of 34.0 to 34.9 in adult  Follow-up with weight loss specialist.    4. Type 2 diabetes mellitus without complication, without long-term current use of insulin (HCC)  OPHTHALMOLOGY - INTERNAL  Microalb/Creat Ratio, Random Urine; Future  Hemoglobin A1C; Future    5. Follicular lymphoma grade II of intra-abdominal lymph nodes (HCC)  CBC With  Differential With Platelet; Future    6. Polycystic ovaries  OBG Referral - [place] ([facility])    7. Colon cancer screening  Repeat colonoscopy in 2031.    Return in about 6 months (around 4/4/2025) for Routine Follow-Up.    This note was prepared using Dragon Medical voice recognition dictation software. As a result errors may occur. When identified these errors have been corrected. While every attempt is made to correct errors during dictation discrepancies may still exist.    Weston Jordan MD  10/4/2024

## 2024-10-04 NOTE — PATIENT INSTRUCTIONS
Prevention Guidelines, Women Ages 50 to 64  Screening tests and vaccines are an important part of managing your health. A screening test is done to find possible disorders or diseases in people who don't have any symptoms. The goal is to find a disease early so lifestyle changes can be made and you can be watched more closely to reduce the risk of disease, or to detect it early enough to treat it most effectively. Screening tests are not considered diagnostic, but are used to determine if more testing is needed. Health counseling is essential, too. Below are guidelines for these, for women ages 50 to 64. Talk with your healthcare provider to make sure you’re up to date on what you need.  Screening Who needs it How often   Type 2 diabetes or prediabetes All women beginning at age 45 and women without symptoms at any age who are overweight or obese and have 1 or more additional risk factors for diabetes. At  least every 3 years   Type 2 diabetes or prediabetes All women diagnosed with gestational diabetes Lifelong testing every 3 years   Type 2 diabetes All women with prediabetes Every year   Alcohol misuse All women in this age group At routine exams   Blood pressure All women in this age group Yearly checkup if your blood pressure is normal  Normal blood pressure is less than 120/80 mm Hg  If your blood pressure reading is higher than normal, follow the advice of your healthcare provider   Breast cancer All women at average risk in this age group Yearly mammogram should be done until age 54. At age 55, you can switch to every other year or choose to continue yearly.  All women should know the possible benefits and risks of breast cancer screening with mammograms.   Cervical cancer All women in this age group, except women who have had a complete hysterectomy Pap test every 3 years or Pap test with human papillomavirus (HPV) test every 5 years   Chlamydia Women at increased risk for infection At routine exams    Colorectal cancer All women at average risk in this age group Multiple tests are available and are used at different times. Possible tests include:  Flexible sigmoidoscopy every 5 years, or  Colonoscopy every 10 years, or  CT colonography (virtual colonoscopy) every 5 years, or  Yearly fecal occult blood test, or  Yearly fecal immunochemical test every year, or  Stool DNA test, every 3 years  If you choose a test other than a colonoscopy and have an abnormal test result, you will need to follow up with a colonoscopy. Screening advice varies among expert groups. Talk with your healthcare provider about which tests are best for you.  Some people should be screened using a different schedule because of their personal or family health history. Talk with your healthcare provider about your health history.   Depression All women in this age group At routine exams   Gonorrhea Sexually active women at increased risk for infection At routine exams   Hepatitis C Anyone at increased risk; 1 time for those born between 1945 and 1965 At routine exams   High cholesterol or triglycerides All women in this age group who are at risk for coronary artery disease At least every 5 years   HIV All women At routine exams   Lung cancer Adults age 55 to 80 who have smoked Yearly screening in smokers with 30 pack-year history of smoking or who quit within 15 years   Obesity All women in this age group At routine exams   Osteoporosis Women who are postmenopausal Ask your healthcare provider   Syphilis Women at increased risk for infection - talk with your healthcare provider At routine exams   Tuberculosis Women at increased risk for infection - talk with your healthcare provider Ask your healthcare provider   Vision All women in this age group Ask your healthcare provider   Vaccine Who needs it How often   Chickenpox (varicella) All women in this age group who have no record of this infection or vaccine 2 doses; the second dose should be  given at least 4 weeks after the first dose   Hepatitis A Women at increased risk for infection - talk with your healthcare provider 2 doses given at least 6 months apart   Hepatitis B Women at increased risk for infection - talk with your healthcare provider 3 doses over 6 months; second dose should be given 1 month after the first dose; the third dose should be given at least 2 months after the second dose and at least 4 months after the first dose   Haemophilus influenzae Type B (HIB) Women at increased risk for infection - talk with your healthcare provider 1 to 3 doses   Influenza (flu) All women in this age group Once a year   Measles, mumps, rubella (MMR) Women in this age group through their late 50s who have no record of these infections or vaccines 1 dose   Meningococcal Women at increased risk for infection - talk with your healthcare provider 1 or more doses   Pneumococcal conjugate vaccine (PCV13) and pneumococcal polysaccharide vaccine (PPSV23) Women at increased risk for infection - talk with your healthcare provider PCV13: 1 dose ages 19 to 65 (protects against 13 types of pneumococcal bacteria)  PPSV23: 1 to 2 doses through age 64, or 1 dose at 65 or older (protects against 23 types of pneumococcal bacteria)   Tetanus/diphtheria/pertussis (Td/Tdap) booster All women in this age group Td every 10 years, or a 1-time dose of Tdap instead of a Td booster after age 18, then Td every 10 years   Zoster All women ages 60 and older 1 dose   Counseling Who needs it How often   BRCA gene mutation testing for breast and ovarian cancer susceptibility Women with increased risk for having gene mutation When your risk is known   Breast cancer and chemoprevention Women at high risk for breast cancer When your risk is known   Diet and exercise Women who are overweight or obese When diagnosed, and then at routine exams   Sexually transmitted infection prevention Women at increased risk for infection - talk with your  healthcare provider At routine exams   Use of daily aspirin Women ages 55 and up in this age group who are at risk for cardiovascular health problems such as stroke When your risk is known   Use of tobacco and the health effects it can cause All women in this age group Every exam   1 American [employer]  Date Last Reviewed: 1/26/2016  © 3072-5036 [employer]. [address] [place] [address], [place] [address]. All rights reserved. This information is not intended as a substitute for professional medical care. Always follow your healthcare professional's instructions.

## 2024-10-04 NOTE — H&P (VIEW-ONLY)
Patient ID: Azul Joseph is a 49 year old female.  Chief Complaint   Patient presents with    Physical        HISTORY OF PRESENT ILLNESS:   HPI  Patient presents for above.  Here for her annual physical exam.    History of hypercholesterolemia on rosuvastatin.  Compliant with medications.  Needs levels rechecked.    Has been seeing a weight loss specialist.  She was found to have an A1c of 6.3 and was classified as diabetic.  She is currently on Ozempic for diabetes and weight loss purposes.  Initially did lose significant weight on Ozempic but has not plateaued and gained a few pounds back.    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  She underwent chemotherapy with very favorable results.  One side effect it appears is that she has neuropathy of her feet.  She is also on her feet for much of the day as she is a .  Neuropathy has improved over time.  She is doing very well from lymphoma standpoint.  She will be having her port removed soon and the frequency of visits with her hematologist will be decreased to twice a year.    History of polycystic ovaries.  Was taking metformin many years ago but no longer doing so.  She does follow with a local gynecologist.     She did have a colonoscopy in 2024 with repeat to be done in 2031.  Had mammogram in 2024 with repeat to be done in 1 year.  Gets orders from her gynecologist.  Last Pap smear was in 2022.  Does not want any other age-appropriate vaccinations.    Review of Systems  Ten point review of systems otherwise negative with the exception of HPI and assessment and plan.    MEDICAL HISTORY:     Past Medical History:    Allergic rhinitis    Anxiety    occasional    Artificial insemination    Back problem    DDD    Cancer (HCC)    non hodgkin's lymphoma    Cholecystitis    Laser cholecystectomy 2011    Decorative tattoo    Degenerative lumbar disc    Degenerative L2 disc, Therapy    H/O pregnancy    EAB x 2-1988, 1989;   X 3- , ?, 2009    Helicobacter positive gastritis    Noted on EGD; no ulcer. On abx    High cholesterol    Hormone disorder    Hyperlipidemia    Infertility    Art insemination    Lipid screening    per NG    Obesity (BMI 30-39.9)    Obesity, unspecified    Polycystic ovaries    Artificial insemination/clomid    Screen for colon cancer    no adenomatous polyp; repeat CLN in 10 yrs    Tonsillitis    Tonsillectomy    Type 2 diabetes mellitus without complication, without long-term current use of insulin (HCC)       Past Surgical History:   Procedure Laterality Date           X 3, , ?,        &     Cholecystectomy      laser cholecystectomy    Colonoscopy      Colonoscopy N/A 2024    Procedure: COLONOSCOPY;  Surgeon: Barrie Kaufman MD;  Location: [facility] ENDOSCOPY    Skin surgery      #2 moles removed on back    Tonsillectomy           Current Outpatient Medications:     Magnesium 100 MG Oral Tab, Take 1.65 tablets (165 mg total) by mouth., Disp: , Rfl:     Rizatriptan Benzoate (MAXALT) 10 MG Oral Tab, Take 1 tablet (10 mg total) by mouth as needed for Migraine. Do not dosages within 2 hours of each other., Disp: 10 tablet, Rfl: 1    butalbital-acetaminophen-caffeine -40 MG Oral Tab, Take 1 tablet by mouth every 6 (six) hours as needed for Pain., Disp: 30 tablet, Rfl: 0    semaglutide (OZEMPIC, 0.25 OR 0.5 MG/DOSE,) 2 MG/3ML Subcutaneous Solution Pen-injector, Inject 0.5 mg into the skin once a week., Disp: 3 mL, Rfl: 3    Allergies:  Allergies   Allergen Reactions    Latex RASH and ITCHING     redness       Social History     Socioeconomic History    Marital status:      Spouse name: Not on file    Number of children: Not on file    Years of education: Not on file    Highest education level: Not on file   Occupational History    Not on file   Tobacco Use    Smoking status: Former     Current packs/day: 0.00     Types: Cigarettes     Quit  date: 1993     Years since quittin.5    Smokeless tobacco: Never   Vaping Use    Vaping status: Never Used   Substance and Sexual Activity    Alcohol use: Yes     Alcohol/week: 1.0 standard drink of alcohol     Types: 1 Standard drinks or equivalent per week     Comment: very occasional    Drug use: No    Sexual activity: Yes     Partners: Male     Birth control/protection: Vasectomy   Other Topics Concern     Service Not Asked    Blood Transfusions Not Asked    Caffeine Concern Yes     Comment: Coffee, 1 cup daily    Occupational Exposure Not Asked    Hobby Hazards Not Asked    Sleep Concern Not Asked    Stress Concern Not Asked    Weight Concern Not Asked    Special Diet Not Asked    Back Care Not Asked    Exercise Yes     Comment: walking daily    Bike Helmet Not Asked    Seat Belt Not Asked    Self-Exams Not Asked    Grew up on a farm Not Asked    History of tanning Yes    Outdoor occupation Not Asked    Pt has a pacemaker Not Asked    Pt has a defibrillator Not Asked    Breast feeding No    Reaction to local anesthetic No   Social History Narrative    Not on file     Social Determinants of Health     Financial Resource Strain: Not on file   Food Insecurity: Not on file   Transportation Needs: Not on file   Physical Activity: Not on file   Stress: Not on file   Social Connections: Not on file   Housing Stability: Not on file           PHYSICAL EXAM:     Vitals:    10/04/24 0949   BP: 102/76   Pulse: 91   Resp: 18   Temp: 97.5 °F (36.4 °C)   TempSrc: Temporal   SpO2: 96%   Weight: 193 lb (87.5 kg)   Height: 5' 3\" (1.6 m)       Body mass index is 34.19 kg/m².    Physical Exam  Constitutional:       Appearance: Normal appearance.   HENT:      Head: Normocephalic and atraumatic.      Right Ear: External ear normal.      Left Ear: External ear normal.      Nose: Nose normal.      Mouth/Throat:      Mouth: Mucous membranes are moist.      Pharynx: Oropharynx is clear.   Eyes:      General: No scleral  icterus.        Right eye: No discharge.         Left eye: No discharge.      Extraocular Movements: Extraocular movements intact.      Conjunctiva/sclera: Conjunctivae normal.      Pupils: Pupils are equal, round, and reactive to light.   Cardiovascular:      Rate and Rhythm: Normal rate and regular rhythm.      Pulses: Normal pulses.      Heart sounds: Normal heart sounds. No murmur heard.     No friction rub. No gallop.   Pulmonary:      Effort: Pulmonary effort is normal. No respiratory distress.      Breath sounds: No stridor. No wheezing or rales.   Abdominal:      General: Abdomen is flat. Bowel sounds are normal. There is no distension.      Palpations: There is no mass.      Tenderness: There is no abdominal tenderness. There is no guarding.   Genitourinary:     Comments: Exam deferred to patient's gynecologist.  Musculoskeletal:         General: No swelling. Normal range of motion.      Cervical back: Normal range of motion.      Right lower leg: No edema.      Left lower leg: No edema.   Skin:     General: Skin is warm.   Neurological:      General: No focal deficit present.      Mental Status: She is alert.   Psychiatric:         Mood and Affect: Mood normal.         Behavior: Behavior normal.           ASSESSMENT/PLAN:   1. Annual physical exam  CBC With Differential With Platelet; Future  Comp Metabolic Panel (14); Future  Lipid Panel; Future  TSH W Reflex To Free T4; Future    2. Pure hypercholesterolemia  Comp Metabolic Panel (14); Future  Lipid Panel; Future    3. Class 1 obesity due to excess calories without serious comorbidity with body mass index (BMI) of 34.0 to 34.9 in adult  Follow-up with weight loss specialist.    4. Type 2 diabetes mellitus without complication, without long-term current use of insulin (HCC)  OPHTHALMOLOGY - INTERNAL  Microalb/Creat Ratio, Random Urine; Future  Hemoglobin A1C; Future    5. Follicular lymphoma grade II of intra-abdominal lymph nodes (HCC)  CBC With  Differential With Platelet; Future    6. Polycystic ovaries  OBG Referral - [place] ([facility])    7. Colon cancer screening  Repeat colonoscopy in 2031.    Return in about 6 months (around 4/4/2025) for Routine Follow-Up.    This note was prepared using Dragon [employer] voice recognition dictation software. As a result errors may occur. When identified these errors have been corrected. While every attempt is made to correct errors during dictation discrepancies may still exist.    Weston Jordan MD  10/4/2024

## 2024-10-07 ENCOUNTER — TELEPHONE (OUTPATIENT)
Dept: OBGYN CLINIC | Facility: CLINIC | Age: 49
End: 2024-10-07

## 2024-10-07 ENCOUNTER — HOSPITAL ENCOUNTER (OUTPATIENT)
Dept: INTERVENTIONAL RADIOLOGY/VASCULAR | Facility: HOSPITAL | Age: 49
Discharge: HOME | End: 2024-10-07
Attending: INTERNAL MEDICINE
Payer: COMMERCIAL

## 2024-10-07 DIAGNOSIS — E11.9 TYPE 2 DIABETES MELLITUS WITHOUT COMPLICATION, WITHOUT LONG-TERM CURRENT USE OF INSULIN (HCC): Primary | ICD-10-CM

## 2024-10-07 DIAGNOSIS — Z12.31 ENCOUNTER FOR SCREENING MAMMOGRAM FOR BREAST CANCER: Primary | ICD-10-CM

## 2024-10-07 NOTE — TELEPHONE ENCOUNTER
Last annual - 11/5/2022  Last mammogram - 10/16/2023, negative    Next annual - 10/18/2024    Left message that order was placed.

## 2024-10-10 ENCOUNTER — HOSPITAL ENCOUNTER (OUTPATIENT)
Dept: INTERVENTIONAL RADIOLOGY/VASCULAR | Facility: HOSPITAL | Age: 49
Discharge: HOME | End: 2024-10-10
Attending: INTERNAL MEDICINE | Admitting: RADIOLOGY
Payer: COMMERCIAL

## 2024-10-10 VITALS
DIASTOLIC BLOOD PRESSURE: 88 MMHG | HEART RATE: 73 BPM | OXYGEN SATURATION: 98 % | HEIGHT: 64 IN | BODY MASS INDEX: 32.44 KG/M2 | RESPIRATION RATE: 10 BRPM | WEIGHT: 190 LBS | SYSTOLIC BLOOD PRESSURE: 126 MMHG | TEMPERATURE: 97 F

## 2024-10-10 DIAGNOSIS — Z95.828 PORT-A-CATH IN PLACE: ICD-10-CM

## 2024-10-10 PROCEDURE — 36415 COLL VENOUS BLD VENIPUNCTURE: CPT

## 2024-10-10 PROCEDURE — 36590 REMOVAL TUNNELED CV CATH: CPT | Performed by: RADIOLOGY

## 2024-10-10 PROCEDURE — 99152 MOD SED SAME PHYS/QHP 5/>YRS: CPT | Performed by: RADIOLOGY

## 2024-10-10 PROCEDURE — 0JPT0XZ REMOVAL OF TUNNELED VASCULAR ACCESS DEVICE FROM TRUNK SUBCUTANEOUS TISSUE AND FASCIA, OPEN APPROACH: ICD-10-PCS | Performed by: RADIOLOGY

## 2024-10-10 RX ORDER — LIDOCAINE HYDROCHLORIDE 20 MG/ML
INJECTION, SOLUTION INFILTRATION; PERINEURAL
Status: COMPLETED
Start: 2024-10-10 | End: 2024-10-10

## 2024-10-10 RX ORDER — SODIUM CHLORIDE 9 MG/ML
INJECTION, SOLUTION INTRAVENOUS CONTINUOUS
Status: DISCONTINUED | OUTPATIENT
Start: 2024-10-10 | End: 2024-10-10

## 2024-10-10 RX ORDER — MIDAZOLAM HYDROCHLORIDE 1 MG/ML
INJECTION INTRAMUSCULAR; INTRAVENOUS
Status: COMPLETED
Start: 2024-10-10 | End: 2024-10-10

## 2024-10-10 RX ADMIN — SODIUM CHLORIDE: 9 INJECTION, SOLUTION INTRAVENOUS at 07:15:00

## 2024-10-10 NOTE — DISCHARGE INSTRUCTIONS
INTERVENTIONAL RADIOLOGY  [facility]  [phone]     Patient Name:  Azul Joseph    Procedure:  Chest Port Removal    Site Care: Dermabond (skin glue) has been applied to your incision.  Do not scrub the area.  Allow the Dermabond to flake off on its own.  Keep site dry for 48 hours.  Gently wash area with soap and water.                                        Activity/Diet  No heavy lifting or strenuous activity for 48 hours.  Drink plenty of fluids, unless you have otherwise been told to restrict your fluid intake.  Do not drink alcohol for 24 hours.  Do not drive,  operate heavy machinery, make important decisions or sign legal documents today.    Medications:  Take acetaminophen if needed for pain. Do not exceed 4000mg of acetaminophen in a 24-hour period., Do not take blood thinners, aspirin, or Plavix for 24 hours., and Make no changes to your existing medications.    Contact Interventional Radiology at [phone] if you have severe/unrelieved pain, fever, chills, dizziness/lightheadedness, or drainage/bleeding from your incision site.

## 2024-10-10 NOTE — IVS NOTE
DISCHARGE NOTE     Pt is able to sit up and ambulate without difficulty.   Pt voided and tolerated fluids and food.   Procedural site remains dry and intact with good circulation, motion and sensation.   No signs or symptoms of bleeding/hematoma noted.   Pt denies any pain or discomfort at this time.  IV access removed  Instructions provided, patient/family verbalizes understanding.   Dr. Dias spoke with patient/family post procedure.     Pt discharge via wheelchair to [facility]     Follow up Appointment: n/a    New Prescription: n/a

## 2024-10-10 NOTE — INTERVAL H&P NOTE
The above referenced H&P was reviewed by Rick Dias MD on 10/10/2024, the patient was examined and no significant changes have occurred in the patient's condition since the H&P was performed.  Risks, benefits, alternative treatments and consequences of no treatment were discussed.  We will proceed with procedure as planned.      Rick Dias MD  10/10/2024  7:21 AM

## 2024-10-16 ENCOUNTER — PATIENT MESSAGE (OUTPATIENT)
Dept: INTERNAL MEDICINE CLINIC | Facility: CLINIC | Age: 49
End: 2024-10-16

## 2024-10-16 DIAGNOSIS — E78.00 PURE HYPERCHOLESTEROLEMIA: Primary | ICD-10-CM

## 2024-10-17 NOTE — TELEPHONE ENCOUNTER
Please respond directly to the patient if no additional staff support is required.      DR Jordan=see patient's reply regarding cholesterol medication.Thanks.       LABS 10/4/24;  Azul,     Your labs are attached.  All were normal except for your cholesterol levels increasing significantly.  Can you confirm that you are taking the rosuvastatin?  If so then we will need to modify your medication regimen.     Regards,  Weston Jordan MD  [phone]   Written by Weston Jordan MD on 10/16/2024  5:49 PM CDT  Seen by patient Azul Hiram Salazar on 10/16/2024  6:35 PM

## 2024-10-18 ENCOUNTER — OFFICE VISIT (OUTPATIENT)
Dept: OBGYN CLINIC | Facility: CLINIC | Age: 49
End: 2024-10-18
Payer: COMMERCIAL

## 2024-10-18 VITALS
WEIGHT: 193.69 LBS | DIASTOLIC BLOOD PRESSURE: 89 MMHG | BODY MASS INDEX: 33 KG/M2 | HEART RATE: 76 BPM | SYSTOLIC BLOOD PRESSURE: 126 MMHG

## 2024-10-18 DIAGNOSIS — N95.1 PERIMENOPAUSAL: ICD-10-CM

## 2024-10-18 DIAGNOSIS — Z01.419 ENCOUNTER FOR GYNECOLOGICAL EXAMINATION WITHOUT ABNORMAL FINDING: Primary | ICD-10-CM

## 2024-10-18 PROCEDURE — 3079F DIAST BP 80-89 MM HG: CPT | Performed by: OBSTETRICS & GYNECOLOGY

## 2024-10-18 PROCEDURE — 99396 PREV VISIT EST AGE 40-64: CPT | Performed by: OBSTETRICS & GYNECOLOGY

## 2024-10-18 PROCEDURE — 3074F SYST BP LT 130 MM HG: CPT | Performed by: OBSTETRICS & GYNECOLOGY

## 2024-10-18 NOTE — PROGRESS NOTES
Azul Joseph is a 49 year old female  Patient's last menstrual period was 2024 (exact date).   Chief Complaint   Patient presents with    Gyn Exam     ANNUAL EXAM AND CONSULT PERIMENOPAUSAL -- new pt to Dr. Pride. Typically Dr. Beck pt. Last seen in     Gyn Problem     Last period in July. Used to have bad hot flashes / vaginal dryness. Now resolved. Also prior severe HA & resolved. Wondering on perimenopause. Had chemo  & periods stopped x 10 months -- returned very heavy & became infreq -- no eval done.   .    OBSTETRICS HISTORY:     OB History    Para Term  AB Living   4 2 2 0 2 2   SAB IAB Ectopic Multiple Live Births   0 2 0 0 2      # Outcome Date GA Lbr Jonathan/2nd Weight Sex Type Anes PTL Lv   4 IAB            3 IAB            2 Term     M CS-LTranv   MARIANGEL   1 Term     M CS-LTranv   MARIANGEL       GYNE HISTORY:     Periods  irregular       BCM:  None    History   Sexual Activity    Sexual activity: Yes    Partners: Male    Birth control/ protection: Vasectomy        Menarche: 14  Use of Birth Control (if yes, specify type): vasectomy  Pap Date: 22  Pap Result Notes: Pap neg HPV neg   Follow Up Recommendation: Mammo 10/16/2023 neg asses. ANNUAL 2022 JALEN          Latest Ref Rng & Units 2022    11:47 AM 2018     4:59 PM   RECENT PAP RESULTS   INTERPRETATION/RESULT: Negative for intraepithelial lesion or malignancy Negative for intraepithelial lesion or malignancy  Negative for intraepithelial lesion or malignancy    HPV Negative Negative  Negative          MEDICAL HISTORY:     Past Medical History:    Allergic rhinitis    Anxiety    occasional    Back problem    DDD    Cancer (HCC)    non hodgkin's lymphoma    Decorative tattoo    Degenerative lumbar disc    Degenerative L2 disc, Therapy    Helicobacter positive gastritis    Noted on EGD; no ulcer. On abx    High cholesterol    Hyperlipidemia    Infertility    Art insemination    Polycystic ovaries     Artificial insemination/clomid    Screen for colon cancer    no adenomatous polyp; repeat CLN in 10 yrs    Type 2 diabetes mellitus without complication, without long-term current use of insulin (HCC)     Past Surgical History:   Procedure Laterality Date       &     Cholecystectomy      laser cholecystectomy    Colonoscopy N/A 2024    Procedure: COLONOSCOPY;  Surgeon: Barrie Kaufman MD;  Location: [facility] ENDOSCOPY    Skin surgery      #2 moles removed on back    Tonsillectomy       OB History    Para Term  AB Living   4 2 2 0 2 2   SAB IAB Ectopic Multiple Live Births   0 2 0 0 2        SOCIAL HISTORY:     Tobacco Use: Medium Risk (10/18/2024)    Patient History     Smoking Tobacco Use: Former     Smokeless Tobacco Use: Never     Passive Exposure: Not on file       FAMILY HISTORY:     Family History   Problem Relation Age of Onset    Diabetes Father 65    Hypertension Mother     High Cholesterol Mother     Diabetes Mother         Bord DM    Breast Cancer Mother 55    Heart Attack Mother     Alcohol and Other Disorders Associated Maternal Grandmother         alcoholism    Cancer Maternal Grandmother 42        Cancer-lung, smoker    Diabetes Maternal Grandfather     Cancer Self 47        non hodgkins lymphoma    Diabetes Maternal Uncle     Hypertension Maternal Uncle     Diabetes Maternal Aunt     Hypertension Maternal Aunt          MEDICATIONS:       Current Outpatient Medications:     semaglutide 4 MG/3ML Subcutaneous Solution Pen-injector, Inject 1 mg into the skin once a week., Disp: 3 mL, Rfl: 5    Magnesium 100 MG Oral Tab, Take 1.65 tablets (165 mg total) by mouth., Disp: , Rfl:     ALLERGIES:     Allergies[1]      REVIEW OF SYSTEMS:     Constitutional:    denies fever / chills  Eyes:     denies blurred or double vision  Cardiovascular:  denies chest pain or palpitations  Respiratory:    denies shortness of breath  Gastrointestinal:  denies severe abdominal pain,  frequent diarrhea or constipation, nausea / vomiting  Genitourinary:    denies dysuria, bothersome incontinence  Skin/Breast:   denies any breast pain, lumps, or discharge  Neurological:    denies frequent severe headaches  Psychiatric:   denies depression or anxiety, thoughts of harming self or others  Heme/Lymph:    denies easy bruising or bleeding      PHYSICAL EXAM:   Blood pressure 126/89, pulse 76, weight 193 lb 11.2 oz (87.9 kg), last menstrual period 07/27/2024, not currently breastfeeding.  Constitutional:  well developed, well nourished  Head/Face:  normocephalic  Neck/Thyroid: thyroid symmetric, no thyromegaly, no nodules, no adenopathy  Lymphatic: no abnormal supraclavicular or axillary adenopathy is noted  Breast:   normal without palpable masses, tenderness, asymmetry, nipple discharge, nipple retraction or skin changes  Abdomen:   soft, nontender, nondistended, no masses  Skin/Hair:  no unusual rashes or bruises  Extremities:  no edema, no cyanosis  Psychiatric:   oriented to time, place, person and situation. Appropriate mood and affect    Pelvic Exam:  External Genitalia:  normal appearance, hair distribution, and no lesions  Urethral Meatus:   normal in size, location, without lesions and prolapse  Bladder:    no fullness, masses or tenderness  Vagina:    normal appearance without lesions, no abnormal discharge  Cervix:    normal without tenderness on motion  Uterus:    normal in size, contour, position, mobility, without tenderness  Adnexa:   normal without masses or tenderness  Perineum:   normal  Anus: no hemorroids         ASSESSMENT & PLAN:     Azul was seen today for gyn exam and gyn problem.    Diagnoses and all orders for this visit:    Encounter for gynecological examination without abnormal finding    Perimenopausal    Reviewed need for evalation if abn vaginal bleeding occurs -- reviewed defn in detail. Did not have any eval in 2021 when periods superheavy.    SUMMARY:  Pap: Next  cotest 11/25-27 per ASCCP guidelines.  BCM:  None  STD screening: declines  Mammogram: ordered placed  HM updated  Depression screen:   Depression Screening (PHQ-2/PHQ-9): Over the LAST 2 WEEKS   Little interest or pleasure in doing things (over the last two weeks)?: Not at all    Feeling down, depressed, or hopeless (over the last two weeks)?: Not at all    PHQ-2 SCORE: 0          FOLLOW-UP     Return in about 1 year (around 10/18/2025) for annual gyne exam, cotest.    Note to patient and family:  The 21st Century Cures Act makes medical notes available to patients in the interest of transparency.  However, please be advised that this is a medical document.  It is intended as a peer to peer communication.  It is written in medical language and may contain abbreviations or verbiage that are technical and unfamiliar.  It may appear blunt or direct.  Medical documents are intended to carry relevant information, facts as evident, and the clinical opinion of the practitioner.         [1]   Allergies  Allergen Reactions    Latex RASH and ITCHING     redness

## 2024-10-21 NOTE — TELEPHONE ENCOUNTER
Spoke with pt declined 10/31 appt with dr conde. Pt requested appt with dr adame to be canceled as well. Pt advised she had headaches for over 2 weeks advised they are gone and has seen gynecologist and had a physical completed believe its PERIMENOPAUSAL. If anything changes advised will reach out.

## 2024-10-24 ENCOUNTER — PATIENT MESSAGE (OUTPATIENT)
Dept: SURGERY | Facility: CLINIC | Age: 49
End: 2024-10-24

## 2024-10-24 DIAGNOSIS — E11.9 TYPE 2 DIABETES MELLITUS WITHOUT COMPLICATION, WITHOUT LONG-TERM CURRENT USE OF INSULIN (HCC): Primary | ICD-10-CM

## 2024-10-24 RX ORDER — TIRZEPATIDE 10 MG/.5ML
10 INJECTION, SOLUTION SUBCUTANEOUS WEEKLY
Qty: 2 ML | Refills: 5 | Status: SHIPPED | OUTPATIENT
Start: 2024-10-24

## 2024-11-05 ENCOUNTER — TELEPHONE (OUTPATIENT)
Dept: SURGERY | Facility: CLINIC | Age: 49
End: 2024-11-05

## 2024-12-11 ENCOUNTER — HOSPITAL ENCOUNTER (OUTPATIENT)
Dept: MAMMOGRAPHY | Facility: HOSPITAL | Age: 49
Discharge: HOME | End: 2024-12-11
Attending: OBSTETRICS & GYNECOLOGY
Payer: COMMERCIAL

## 2024-12-11 DIAGNOSIS — Z12.31 ENCOUNTER FOR SCREENING MAMMOGRAM FOR BREAST CANCER: ICD-10-CM

## 2024-12-11 PROCEDURE — 77067 SCR MAMMO BI INCL CAD: CPT | Performed by: OBSTETRICS & GYNECOLOGY

## 2024-12-11 PROCEDURE — 77063 BREAST TOMOSYNTHESIS BI: CPT | Performed by: OBSTETRICS & GYNECOLOGY

## 2025-03-10 ENCOUNTER — NURSE ONLY (OUTPATIENT)
Age: 50
End: 2025-03-10
Attending: INTERNAL MEDICINE
Payer: COMMERCIAL

## 2025-03-10 ENCOUNTER — OFFICE VISIT (OUTPATIENT)
Age: 50
End: 2025-03-10
Attending: INTERNAL MEDICINE
Payer: COMMERCIAL

## 2025-03-10 VITALS
TEMPERATURE: 98 F | SYSTOLIC BLOOD PRESSURE: 126 MMHG | RESPIRATION RATE: 18 BRPM | HEIGHT: 63 IN | BODY MASS INDEX: 36.04 KG/M2 | DIASTOLIC BLOOD PRESSURE: 91 MMHG | WEIGHT: 203.38 LBS | OXYGEN SATURATION: 97 % | HEART RATE: 82 BPM

## 2025-03-10 DIAGNOSIS — Z08 ENCOUNTER FOR FOLLOW-UP SURVEILLANCE OF LYMPHOMA: ICD-10-CM

## 2025-03-10 DIAGNOSIS — Z85.72 ENCOUNTER FOR FOLLOW-UP SURVEILLANCE OF LYMPHOMA: ICD-10-CM

## 2025-03-10 DIAGNOSIS — C82.13 FOLLICULAR LYMPHOMA GRADE II OF INTRA-ABDOMINAL LYMPH NODES (HCC): ICD-10-CM

## 2025-03-10 DIAGNOSIS — C82.13 FOLLICULAR LYMPHOMA GRADE II OF INTRA-ABDOMINAL LYMPH NODES (HCC): Primary | ICD-10-CM

## 2025-03-10 LAB
ALBUMIN SERPL-MCNC: 4.6 G/DL (ref 3.2–4.8)
ALBUMIN/GLOB SERPL: 1.9 {RATIO} (ref 1–2)
ALP LIVER SERPL-CCNC: 53 U/L
ALT SERPL-CCNC: 33 U/L
ANION GAP SERPL CALC-SCNC: 8 MMOL/L (ref 0–18)
AST SERPL-CCNC: 24 U/L (ref ?–34)
BASOPHILS # BLD AUTO: 0.02 X10(3) UL (ref 0–0.2)
BASOPHILS NFR BLD AUTO: 0.3 %
BILIRUB SERPL-MCNC: 0.9 MG/DL (ref 0.3–1.2)
BUN BLD-MCNC: 17 MG/DL (ref 9–23)
BUN/CREAT SERPL: 19.8 (ref 10–20)
CALCIUM BLD-MCNC: 8.8 MG/DL (ref 8.7–10.4)
CHLORIDE SERPL-SCNC: 106 MMOL/L (ref 98–112)
CO2 SERPL-SCNC: 25 MMOL/L (ref 21–32)
CREAT BLD-MCNC: 0.86 MG/DL
DEPRECATED RDW RBC AUTO: 43.3 FL (ref 35.1–46.3)
EGFRCR SERPLBLD CKD-EPI 2021: 82 ML/MIN/1.73M2 (ref 60–?)
EOSINOPHIL # BLD AUTO: 0.17 X10(3) UL (ref 0–0.7)
EOSINOPHIL NFR BLD AUTO: 3 %
ERYTHROCYTE [DISTWIDTH] IN BLOOD BY AUTOMATED COUNT: 13.5 % (ref 11–15)
GLOBULIN PLAS-MCNC: 2.4 G/DL (ref 2–3.5)
GLUCOSE BLD-MCNC: 101 MG/DL (ref 70–99)
HCT VFR BLD AUTO: 44.9 %
HGB BLD-MCNC: 14.4 G/DL
IMM GRANULOCYTES # BLD AUTO: 0.01 X10(3) UL (ref 0–1)
IMM GRANULOCYTES NFR BLD: 0.2 %
LDH SERPL L TO P-CCNC: 177 U/L
LYMPHOCYTES # BLD AUTO: 1.9 X10(3) UL (ref 1–4)
LYMPHOCYTES NFR BLD AUTO: 33.2 %
MCH RBC QN AUTO: 27.7 PG (ref 26–34)
MCHC RBC AUTO-ENTMCNC: 32.1 G/DL (ref 31–37)
MCV RBC AUTO: 86.5 FL
MONOCYTES # BLD AUTO: 0.7 X10(3) UL (ref 0.1–1)
MONOCYTES NFR BLD AUTO: 12.2 %
NEUTROPHILS # BLD AUTO: 2.93 X10 (3) UL (ref 1.5–7.7)
NEUTROPHILS # BLD AUTO: 2.93 X10(3) UL (ref 1.5–7.7)
NEUTROPHILS NFR BLD AUTO: 51.1 %
OSMOLALITY SERPL CALC.SUM OF ELEC: 290 MOSM/KG (ref 275–295)
PLATELET # BLD AUTO: 281 10(3)UL (ref 150–450)
POTASSIUM SERPL-SCNC: 4.5 MMOL/L (ref 3.5–5.1)
PROT SERPL-MCNC: 7 G/DL (ref 5.7–8.2)
RBC # BLD AUTO: 5.19 X10(6)UL
SODIUM SERPL-SCNC: 139 MMOL/L (ref 136–145)
WBC # BLD AUTO: 5.7 X10(3) UL (ref 4–11)

## 2025-03-10 NOTE — PROGRESS NOTES
HPI     Azul Joseph is a 49 year old female here for follow up of   Encounter Diagnoses   Name Primary?    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    She is here for surveillance visit.    Watching diet.  Lost weight with ozempic with weight loss clinic.    Denies any fevers, NS or involuntary weight loss.  No palpable LNs.      States noticed a bump on the back of the R neck that was there prior to dx and has not changed.  Size of a pea.  States at times does not feel it.     A week ago had pain in the L chest to arm , described as sharp pains.  Not sure if indigestion, also was working on lifting weights. Did not go to the ED.  She is not sure if it was anxiety as has as lot of stress.     LMP 02/14/25, prior to that in January of 2025.  Having breast tenderness.     ECOG PS 0      Review of Systems:     Review of Systems   Constitutional:  Positive for diaphoresis (with hot flashes). Negative for appetite change, fatigue, fever and unexpected weight change.   Respiratory:  Negative for cough and shortness of breath.    Cardiovascular:  Negative for chest pain.   Gastrointestinal:  Negative for abdominal pain.        No GERD.  Has been gassy.   Endocrine: Positive for hot flashes.   Musculoskeletal:  Negative for arthralgias, back pain and neck pain.        Bone pains.   Neurological:  Negative for dizziness and headaches.   Hematological:  Negative for adenopathy.   Psychiatric/Behavioral:  Positive for sleep disturbance (improved).             Current Outpatient Medications   Medication Sig Dispense Refill    Magnesium 100 MG Oral Tab Take 1.65 tablets (165 mg total) by mouth. (Patient not taking: Reported on 3/10/2025)       Allergies:   Allergies   Allergen Reactions    Latex RASH and  ITCHING     redness       Past Medical History:    Allergic rhinitis    Anxiety    occasional    Back problem    DDD    Cancer (HCC)    non hodgkin's lymphoma    Decorative tattoo    Degenerative lumbar disc    Degenerative L2 disc, Therapy    Helicobacter positive gastritis    Noted on EGD; no ulcer. On abx    High cholesterol    Hyperlipidemia    Infertility    Art insemination    Polycystic ovaries    Artificial insemination/clomid    Screen for colon cancer    no adenomatous polyp; repeat CLN in 10 yrs    Type 2 diabetes mellitus without complication, without long-term current use of insulin (HCC)     Past Surgical History:   Procedure Laterality Date       &     Cholecystectomy      laser cholecystectomy    Colonoscopy N/A 2024    Procedure: COLONOSCOPY;  Surgeon: Barrie Kaufman MD;  Location: [facility] ENDOSCOPY    Skin surgery      #2 moles removed on back    Tonsillectomy       Social History     Socioeconomic History    Marital status:    Tobacco Use    Smoking status: Former     Current packs/day: 0.00     Types: Cigarettes     Quit date: 1993     Years since quittin.9    Smokeless tobacco: Never   Vaping Use    Vaping status: Never Used   Substance and Sexual Activity    Alcohol use: Yes     Alcohol/week: 1.0 standard drink of alcohol     Types: 1 Standard drinks or equivalent per week     Comment: very occasional    Drug use: No    Sexual activity: Yes     Partners: Male     Birth control/protection: Vasectomy   Other Topics Concern    Caffeine Concern Yes     Comment: Coffee, 1 cup daily    Exercise Yes     Comment: walking daily    History of tanning Yes    Breast feeding No    Reaction to local anesthetic No       Family History   Problem Relation Age of Onset    Diabetes Father 65    Hypertension Mother     High Cholesterol Mother     Diabetes Mother         Bord DM    Breast Cancer Mother 55    Heart Attack Mother     Alcohol and Other Disorders Associated  Maternal Grandmother         alcoholism    Cancer Maternal Grandmother 42        Cancer-lung, smoker    Diabetes Maternal Grandfather     Cancer Self 47        non hodgkins lymphoma    Diabetes Maternal Uncle     Hypertension Maternal Uncle     Diabetes Maternal Aunt     Hypertension Maternal Aunt          PHYSICAL EXAM:    BP (!) 126/91 (BP Location: Left arm, Patient Position: Sitting, Cuff Size: large)   Pulse 82   Temp 97.7 °F (36.5 °C) (Other)   Resp 18   Ht 1.6 m (5' 3\")   Wt 92.3 kg (203 lb 6.4 oz)   LMP 11/07/2024 (Exact Date)   SpO2 97%   BMI 36.03 kg/m²   Wt Readings from Last 6 Encounters:   03/10/25 92.3 kg (203 lb 6.4 oz)   10/18/24 87.9 kg (193 lb 11.2 oz)   10/10/24 86.2 kg (190 lb)   09/09/24 81.6 kg (180 lb)   10/04/24 87.5 kg (193 lb)   09/06/24 87 kg (191 lb 12.8 oz)     Physical Exam  General: Patient is alert, not in acute distress.  HEENT: EOMs intact. PERRL.   Neck: No JVD. No palpable lymphadenopathy. Neck is supple.  Chest: Clear to auscultation.    Heart: Regular rate and rhythm.   Abdomen: Soft, non tender with good bowel sounds.  Extremities: No edema.  Neurological: Grossly intact.   Lymphatics: There is no palpable lymphadenopathy throughout in the cervical, supraclavicular, axillary, or inguinal regions.  Psych/Depression: nl        ASSESSMENT/PLAN:     1. 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    Discussed with the patient that there is discordance in the pathology report and the level of activity on these keisha basins seen on the PET/CT.  Discussed that the level  of activity on PET/CT is more suggestive of a high-grade lymphoma.    Discussed that the therapies for low-grade and high-grade lymphoma are markedly different, as well as he prognosis is different for these 2.    Discussed that in order to determine if this is a high-grade lymphoma, recommend an excisional biopsy.  This would be very difficult to diagnose with another image guided biopsy.  Discussed that there was an area of necrosis seen on the image guided core biopsy, which is feature usually associated with high-grade lymphoma.    She was referred to Dr. Mendoza from surgical oncology for a robotic guided biopsy of the mesenteric lymph nodes.  This was performed on 1/10/2022.    Discussed with patient that fortunately this is still consistent with a grade 1-2 follicular lymphoma.    Discussed with the patient that given diagnoses been confirmed as a low-grade follicular lymphoma, we will proceed with treatment with Bendamustine and rituximab.      Her FLIPI score is 0.  Discussed with the patient that this low risk group with the addition of rituximab to treatment plan, had a 2-year overall survival of 98%, 2-year progression free survival 84% and a median progression free survival of 84 months      S/p cycle 6 of BR in August 2022.    Patient with evidence of complete response on PET/CT prior [place] criteria.    Discussed on maintenance therapy with Rituxan for 2 years.  However, on updated NCCN guideline 4.2022 the state that maintenance therapy is suggested. Pt considered and declinined maintenance, based on data discussed and guidelines.     On surveillance as per NCCN guidelines with CBC, CMP and LDH every 3 months for 2 yrs and CT scan every 6 months for 2 yrs (from August, 2022 to August, 2024).      Yrs 3-5 (until August of 2027) labs q 6 mo, then yearly.  H+P each visit.    CT in February 2024 AMANDA.  Last CT in August 2024, AMANDA.  This will complete her imaging and then imaging will be as needed for  symptom.        No follow-ups on file.      MDM low    No orders of the defined types were placed in this encounter.      Results From Past 48 Hours:  Recent Results (from the past 48 hours)   CBC With Differential With Platelet    Collection Time: 03/10/25  8:56 AM   Result Value Ref Range    WBC 5.7 4.0 - 11.0 x10(3) uL    RBC 5.19 3.80 - 5.30 x10(6)uL    HGB 14.4 12.0 - 16.0 g/dL    HCT 44.9 35.0 - 48.0 %    MCV 86.5 80.0 - 100.0 fL    MCH 27.7 26.0 - 34.0 pg    MCHC 32.1 31.0 - 37.0 g/dL    RDW-SD 43.3 35.1 - 46.3 fL    RDW 13.5 11.0 - 15.0 %    .0 150.0 - 450.0 10(3)uL    Neutrophil Absolute Prelim 2.93 1.50 - 7.70 x10 (3) uL    Neutrophil Absolute 2.93 1.50 - 7.70 x10(3) uL    Lymphocyte Absolute 1.90 1.00 - 4.00 x10(3) uL    Monocyte Absolute 0.70 0.10 - 1.00 x10(3) uL    Eosinophil Absolute 0.17 0.00 - 0.70 x10(3) uL    Basophil Absolute 0.02 0.00 - 0.20 x10(3) uL    Immature Granulocyte Absolute 0.01 0.00 - 1.00 x10(3) uL    Neutrophil % 51.1 %    Lymphocyte % 33.2 %    Monocyte % 12.2 %    Eosinophil % 3.0 %    Basophil % 0.3 %    Immature Granulocyte % 0.2 %   Comp Metabolic Panel (14)    Collection Time: 03/10/25  8:56 AM   Result Value Ref Range    Glucose 101 (H) 70 - 99 mg/dL    Sodium 139 136 - 145 mmol/L    Potassium 4.5 3.5 - 5.1 mmol/L    Chloride 106 98 - 112 mmol/L    CO2 25.0 21.0 - 32.0 mmol/L    Anion Gap 8 0 - 18 mmol/L    BUN 17 9 - 23 mg/dL    Creatinine 0.86 0.55 - 1.02 mg/dL    BUN/CREA Ratio 19.8 10.0 - 20.0    Calcium, Total 8.8 8.7 - 10.4 mg/dL    Calculated Osmolality 290 275 - 295 mOsm/kg    eGFR-Cr 82 >=60 mL/min/1.73m2    ALT 33 10 - 49 U/L    AST 24 <34 U/L    Alkaline Phosphatase 53 39 - 100 U/L    Bilirubin, Total 0.9 0.3 - 1.2 mg/dL    Total Protein 7.0 5.7 - 8.2 g/dL    Albumin 4.6 3.2 - 4.8 g/dL    Globulin  2.4 2.0 - 3.5 g/dL    A/G Ratio 1.9 1.0 - 2.0    Patient Fasting for CMP? Patient not present    LDH    Collection Time: 03/10/25  8:56 AM   Result Value Ref  Range     120 - 246 U/L

## 2025-04-05 ENCOUNTER — TELEPHONE (OUTPATIENT)
Dept: INTERNAL MEDICINE CLINIC | Facility: CLINIC | Age: 50
End: 2025-04-05

## 2025-04-05 DIAGNOSIS — R73.03 PRE-DIABETES: Primary | ICD-10-CM

## 2025-04-06 NOTE — TELEPHONE ENCOUNTER
Dr. Jordan please advise the patients last Ha1c was 5.8. The patient received a Mychart letter with the following below. Would you like her to proceed with testing? Orders pended.    Hello,      This is the [facility], office of Dr. Weston Jordan.     Thank you for putting your trust in [facility].  Our goal is to deliver the highest quality healthcare and an exceptional patient experience. Review of your medical record shows you are due for the following:        Diabetic A1C follow up   Diabetic Eye Exam   Diabetic Foot Exam   Diabetes Care: Microalb/Creat Ratio

## 2025-05-12 ENCOUNTER — TELEPHONE (OUTPATIENT)
Dept: INTERNAL MEDICINE CLINIC | Facility: CLINIC | Age: 50
End: 2025-05-12

## (undated) DIAGNOSIS — C82.13 FOLLICULAR LYMPHOMA GRADE II OF INTRA-ABDOMINAL LYMPH NODES (HCC): Primary | ICD-10-CM

## (undated) DIAGNOSIS — R05.9 COUGH: Primary | ICD-10-CM

## (undated) DIAGNOSIS — Z86.16 PERSONAL HISTORY OF COVID-19: ICD-10-CM

## (undated) DIAGNOSIS — D84.9 IMMUNOCOMPROMISED PATIENT (HCC): ICD-10-CM

## (undated) DEVICE — 3M™ IOBAN™ 2 ANTIMICROBIAL INCISE DRAPE 6650EZ: Brand: IOBAN™ 2

## (undated) DEVICE — BLADELESS OBTURATOR: Brand: WECK VISTA

## (undated) DEVICE — DRAPE SHEET LAPCHOLE 124X100X7

## (undated) DEVICE — FORCEP RADIAL JAW 4

## (undated) DEVICE — SOL H2O 1000ML BTL

## (undated) DEVICE — 6 ML SYRINGE LUER-LOCK TIP: Brand: MONOJECT

## (undated) DEVICE — ARM DRAPE

## (undated) DEVICE — PROVIDES A STERILE INTERFACE BETWEEN THE OPERATING ROOM SURGICAL LAMPS (NON-STERILE) AND THE SURGEON OR NURSE (STERILE).: Brand: STERION®CLAMP COVER FABRIC

## (undated) DEVICE — 3 ML SYRINGE LUER-LOCK TIP: Brand: MONOJECT

## (undated) DEVICE — LINE MNTR ADLT SET O2 INTMD

## (undated) DEVICE — GAMMEX® PI HYBRID SIZE 7, STERILE POWDER-FREE SURGICAL GLOVE, POLYISOPRENE AND NEOPRENE BLEND: Brand: GAMMEX

## (undated) DEVICE — 35 ML SYRINGE REGULAR TIP: Brand: MONOJECT

## (undated) DEVICE — SUTURE MONOCRYL 4-0 PS-2

## (undated) DEVICE — SUTURE PASSOR WITH GUIDE

## (undated) DEVICE — MEDI-VAC NON-CONDUCTIVE SUCTION TUBING 6MM X 1.8M (6FT.) L: Brand: CARDINAL HEALTH

## (undated) DEVICE — SOL  .9 3000ML

## (undated) DEVICE — SUTURE ETHILON 3-0 PS-2

## (undated) DEVICE — KIT ENDO ORCAPOD 160/180/190

## (undated) DEVICE — KIT CLEAN ENDOKIT 1.1OZ GOWNX2

## (undated) DEVICE — PROXIMATE RH ROTATING HEAD SKIN STAPLERS (35 WIDE) CONTAINS 35 STAINLESS STEEL STAPLES: Brand: PROXIMATE

## (undated) DEVICE — MEGA SUTURECUT ND: Brand: ENDOWRIST

## (undated) DEVICE — MEDI-VAC NON-CONDUCTIVE SUCTION TUBING: Brand: CARDINAL HEALTH

## (undated) DEVICE — GIJAW SINGLE-USE BIOPSY FORCEPS WITH NEEDLE: Brand: GIJAW

## (undated) DEVICE — SOL  .9 1000ML BTL

## (undated) DEVICE — Device: Brand: DUAL NARE NASAL CANNULAE FEMALE LUER CON 7FT O2 TUBE

## (undated) DEVICE — PLUMEPORT ACTIV LAPAROSCOPIC SMOKE FILTRATION DEVICE: Brand: PLUMEPORT ACTIVE

## (undated) DEVICE — 40580 - THE PINK PAD - ADVANCED TRENDELENBURG POSITIONING KIT: Brand: 40580 - THE PINK PAD - ADVANCED TRENDELENBURG POSITIONING KIT

## (undated) DEVICE — VESSEL SEALER EXTEND: Brand: ENDOWRIST

## (undated) DEVICE — 60 ML SYRINGE REGULAR TIP: Brand: MONOJECT

## (undated) DEVICE — GOWN SURG AERO BLUE PERF LG

## (undated) DEVICE — ANCHOR TISSUE RETRIEVAL SYSTEM, BAG SIZE 175 ML, PORT SIZE 10 MM: Brand: ANCHOR TISSUE RETRIEVAL SYSTEM

## (undated) DEVICE — LAPAROVUE VISIBILITY SYSTEM LAPAROSCOPIC SOLUTIONS: Brand: LAPAROVUE

## (undated) DEVICE — EXOFIN TISSUE ADHESIVE 1.0ML

## (undated) DEVICE — CLIP HEMOLOK LARGE PURPLE

## (undated) DEVICE — ROBOTIC: Brand: MEDLINE INDUSTRIES, INC.

## (undated) DEVICE — ABSORBABLE HEMOSTAT (OXIDIZED REGENERATED CELLULOSE, U.S.P.): Brand: SURGICEL

## (undated) DEVICE — TOWEL SURG OR 17X30IN BLUE

## (undated) DEVICE — CONMED SCOPE SAVER BITE BLOCK, 20X27 MM: Brand: SCOPE SAVER

## (undated) DEVICE — CANNULA SEAL

## (undated) DEVICE — COLUMN DRAPE

## (undated) NOTE — Clinical Note
Thank you for the referral.   Will start Phentermine 15 mg. Should help with energy.    Thanks [facility]

## (undated) NOTE — LETTER
[address] [place] [address]      Authorization for Surgical Operation and Procedure     Date:___________                                                                                                         Time:_______ following are some, but not all, of the potential risks that can occur: fever and allergic reactions, hemolytic reactions, transmission of diseases such as Hepatitis, AIDS and Cytomegalovirus (CMV) and fluid overload.   In the event that I wish to have an a The surgeon or my attending physician will determine when the applicable recovery period ends for purposes of reinstating the DNAR order.   10. Patients having a sterilization procedure: I understand that if the procedure is successful the results will be p with my patient.     _______________________________________________________________ _____________________________  Kandy Speaks of Physician)                                                                                         (Date)

## (undated) NOTE — MR AVS SNAPSHOT
[facility] [place] [address] [place]  [address] [phone]               Thank you for choosing us for your health care visit with Capo Cui MD.  We are glad to serve you and happy to provide you with this summar [address] [facility]   Phone:  [phone]   Fax:  [phone]    Diagnoses:  Snoring   Order: Op Emh Alt Referral Diagostic Sleep Study Adult    [id number] [facility].  [address] [place] [address]   Phone:  [phone]   Fax:  [phone] This list is accurate as of: 2/14/17 11:59 PM.  Always use your most recent med list.                Atorvastatin Calcium 40 MG Tabs   Take 0.5 tablets (20 mg total) by mouth daily.    Commonly known as:  LIPITOR           nystatin [id number] UNIT/GM Oint   Com Choose whole grain products Foods high in sodium   Water is best for hydration Fast food.    Eat at home when possible     Tips for increasing your physical activity – Adults who are physically active are less likely to develop some chronic diseases than ad

## (undated) NOTE — LETTER
[facility]  Administration of Anesthesia  Azul RESENDEZ agree to be cared for by a physician anesthesiologist alone and/or with a nurse anesthetist, who is specially trained to monitor me and give me medicine to put me to sleep or keep me comfortable during my procedure    I understand that my anesthesiologist and/or anesthetist is not an employee or agent of [facility] or [employer]. He or she works for [facility]    As the patient asking for anesthesia services, I agree to:  Allow the anesthesiologist (anesthesia doctor) to give me medicine and do additional procedures as necessary. Some examples are: Starting or using an “IV” to give me medicine, fluids or blood during my procedure, and having a breathing tube placed to help me breathe when I’m asleep (intubation). In the event that my heart stops working properly, I understand that my anesthesiologist will make every effort to sustain my life, unless otherwise directed by [facility] Do Not Resuscitate documents.  Tell my anesthesia doctor before my procedure:  If I am pregnant.  The last time that I ate or drank.  iii. All of the medicines I take (including prescriptions, herbal supplements, and pills I can buy without a prescription (including street drugs/illegal medications). Failure to inform my anesthesiologist about these medicines may increase my risk of anesthetic complications.  iv.If I am allergic to anything or have had a reaction to anesthesia before.  I understand how the anesthesia medicine will help me (benefits).  I understand that with any type of anesthesia medicine there are risks:  The most common risks are: nausea, vomiting, sore throat, muscle soreness, damage to my eyes, mouth, or teeth (from breathing tube placement).  Rare risks include: remembering what happened during my procedure, allergic reactions to medications, injury to my airway, heart, lungs, vision, nerves,  or muscles and in extremely rare instances death.  My doctor has explained to me other choices available to me for my care (alternatives).  Pregnant Patients (“epidural”):  I understand that the risks of having an epidural (medicine given into my back to help control pain during labor), include itching, low blood pressure, difficulty urinating, headache or slowing of the baby’s heart. Very rare risks include infection, bleeding, seizure, irregular heart rhythms and nerve injury.  Regional Anesthesia (“spinal”, “epidural”, & “nerve blocks”):  I understand that rare but potential complications include headache, bleeding, infection, seizure, irregular heart rhythms, and nerve injury.    _____________________________________________________________________________  Patient (or Representative) Signature/Relationship to Patient  Date   Time    _____________________________________________________________________________   Name (if used)    Language/Organization   Time    _____________________________________________________________________________  Nurse Anesthetist Signature     Date   Time  _____________________________________________________________________________  Anesthesiologist Signature     Date   Time  I have discussed the procedure and information above with the patient (or patient’s representative) and answered their questions. The patient or their representative has agreed to have anesthesia services.    _____________________________________________________________________________  Witness        Date   Time  I have verified that the signature is that of the patient or patient’s representative, and that it was signed before the procedure  Patient Name: Azul Joseph     : 1975                 Printed: 2024 at 10:19 AM    Medical Record #: [id number]                                            Page 1 of 1  ----------ANESTHESIA CONSENT----------

## (undated) NOTE — LETTER
[facility]  [address], [place], [place]  Authorization for Surgical Operation and Procedure                                                                                           I hereby authorize Barrie Kaufman MD, my physician and his/her assistants (if applicable), which may include medical students, residents, and/or fellows, to perform the following surgical operation/ procedure and administer such anesthesia as may be determined necessary by my physician: Operation/Procedure name (s) COLONOSCOPY on Azul Pepito Martin   2.   I recognize that during the surgical operation/procedure, unforeseen conditions may necessitate additional or different procedures than those listed above.  I, therefore, further authorize and request that the above-named surgeon, assistants, or designees perform such procedures as are, in their judgment, necessary and desirable.    3.   My surgeon/physician has discussed prior to my surgery the potential benefits, risks and side effects of this procedure; the likelihood of achieving goals; and potential problems that might occur during recuperation.  They also discussed reasonable alternatives to the procedure, including risks, benefits, and side effects related to the alternatives and risks related to not receiving this procedure.  I have had all my questions answered and I acknowledge that no guarantee has been made as to the result that may be obtained.    4.   Should the need arise during my operation/procedure, which includes change of level of care prior to discharge, I also consent to the administration of blood and/or blood products.  Further, I understand that despite careful testing and screening of blood or blood products by collecting agencies, I may still be subject to ill effects as a result of receiving a blood transfusion and/or blood products.  The following are some, but not all, of the potential risks that can occur: fever and allergic  reactions, hemolytic reactions, transmission of diseases such as Hepatitis, AIDS and Cytomegalovirus (CMV) and fluid overload.  In the event that I wish to have an autologous transfusion of my own blood, or a directed donor transfusion, I will discuss this with my physician.  Check only if Refusing Blood or Blood Products  I understand refusal of blood or blood products as deemed necessary by my physician may have serious consequences to my condition to include possible death. I hereby assume responsibility for my refusal and release the hospital, its personnel, and my physicians from any responsibility for the consequences of my refusal.    o  Refuse   5.   I authorize the use of any specimen, organs, tissues, body parts or foreign objects that may be removed from my body during the operation/procedure for diagnosis, research or teaching purposes and their subsequent disposal by hospital authorities.  I also authorize the release of specimen test results and/or written reports to my treating physician on the hospital medical staff or other referring or consulting physicians involved in my care, at the discretion of the Pathologist or my treating physician.    6.   I consent to the photographing or videotaping of the operations or procedures to be performed, including appropriate portions of my body for medical, scientific, or educational purposes, provided my identity is not revealed by the pictures or by descriptive texts accompanying them.  If the procedure has been photographed/videotaped, the surgeon will obtain the original picture, image, videotape or CD.  The hospital will not be responsible for storage, release or maintenance of the picture, image, tape or CD.    7.   I consent to the presence of a  or observers in the operating room as deemed necessary by my physician or their designees.    8.   I recognize that in the event my procedure results in extended X-Ray/fluoroscopy time, I may  develop a skin reaction.    9. If I have a Do Not Attempt Resuscitation (DNAR) order in place, that status will be suspended while in the operating room, procedural suite, and during the recovery period unless otherwise explicitly stated by me (or a person authorized to consent on my behalf). The surgeon or my attending physician will determine when the applicable recovery period ends for purposes of reinstating the DNAR order.  10. Patients having a sterilization procedure: I understand that if the procedure is successful the results will be permanent and it will therefore be impossible for me to inseminate, conceive, or bear children.  I also understand that the procedure is intended to result in sterility, although the result has not been guaranteed.   11. I acknowledge that my physician has explained sedation/analgesia administration to me including the risk and benefits I consent to the administration of sedation/analgesia as may be necessary or desirable in the judgment of my physician.    I CERTIFY THAT I HAVE READ AND FULLY UNDERSTAND THE ABOVE CONSENT TO OPERATION and/or OTHER PROCEDURE.     _________________________________________ _________________________________     ___________________________________  Signature of Patient     Signature of Responsible Person                   Printed Name of Responsible Person                              _________________________________________ ______________________________        ___________________________________  Signature of Witness         Date  Time         Relationship to Patient    STATEMENT OF PHYSICIAN My signature below affirms that prior to the time of the procedure; I have explained to the patient and/or his/her legal representative, the risks and benefits involved in the proposed treatment and any reasonable alternative to the proposed treatment. I have also explained the risks and benefits involved in refusal of the proposed treatment and alternatives  to the proposed treatment and have answered the patient's questions. If I have a significant financial interest in a co-management agreement or a significant financial interest in any product or implant, or other significant relationship used in this procedure/surgery, I have disclosed this and had a discussion with my patient.     _______________________________________________________________ _____________________________  (Signature of Physician)                                                                                         (Date)                                   (Time)  Patient Name: Azul Joseph    : 1975   Printed: 2024      Medical Record #: [id number]                                              Page 1 of 1

## (undated) NOTE — LETTER
[address]  [address] [place], [place]      Authorization for Surgical Operation and Procedure     Date:___________                                                                                                         Time:_______ risks that can occur: fever and allergic reactions, hemolytic reactions, transmission of diseases such as Hepatitis, AIDS and Cytomegalovirus (CMV) and fluid overload.   In the event that I wish to have an autologous transfusion of my own blood, or a direct determine when the applicable recovery period ends for purposes of reinstating the DNAR order.   10. Patients having a sterilization procedure: I understand that if the procedure is successful the results will be permanent and it will therefore be impossibl _______________________________________________________________ _____________________________  Lopez Leavitt Physician)                                                                                         (Date)                                   (Ti

## (undated) NOTE — LETTER
[facility]  [address], [place], [place]       INFORMED REFUSAL FOR TREATMENT / PROCEDURE / TESTING      I have been advised by  ____________Shae__________________ that it is necessary for me to undergo the following treatment, procedure or testing.  The treatment, procedure or test is as follows:    _____________________________________________________________________    Urine Pregnancy Test  _____________________________________________________________________    The need for this treatment, procedures and/or testing, its benefits and risks, and alternatives has been explained to me by my physician.  I understand that my refusal to consent to the recommended medical treatment or testing may seriously impair my health and even jeopardize my life.  While I understand the possible consequences, I nevertheless refuse to submit to the recommended treatment, procedure or testing against medical advice.  I assume responsibility for the consequences of this refusal and release [facility], [facility] ([facility]), it affiliates and subsidiaries, its employees and agents, and the physician, from any and all liability associated with my refusal to follow the medical advice and permit treatment, procedure or testing.          ________________________________________  (Patient Signature)      Azul Joseph  (Patient Name, Printed)    ________________________________________  (Date)                  Patient Name: Azul Joseph     : 1975                 Printed: 2024      Medical Record #: [id number]                                              Page 1 of 1

## (undated) NOTE — LETTER
[address] [place] [address] Rd, WILLA Gaming       INFORMED REFUSAL FOR TREATMENT / PROCEDURE / TESTING      I have been advised by  ______________________________ that it is necessary for me to undergo the following treatment, procedu

## (undated) NOTE — LETTER
[facility]  Administration of Anesthesia  1. I, Johanny 32, or _________________________________ acting on her behalf, (Patient) (Dependent/Representative) request to receive anesthesia for my pending procedure/operation/treatment.   A bleeding, seizure, cardiac arrest and death. 7. AWARENESS: I understand that it is possible (but unlikely) to have explicit memory of events from the operating room while under general anesthesia.   8. ELECTROCONVULSIVE THERAPY PATIENTS: This consent serve below affirms that prior to the time of the procedure, I have explained to the patient and/or his/her guardian, the risks and benefits of undergoing anesthesia, as well as any reasonable alternatives.     ___________________________________________________

## (undated) NOTE — LETTER
[facility], [facility]  [address] [address]  PH: [phone]  FAX: [phone]        [address]  Azul Pepito Martin, :  1975  [address] [address]      This is the [facility], office of Dr. Weston Jordan.    Thank you for putting your trust in [facility].  Our goal is to deliver the highest quality healthcare and an exceptional patient experience. Review of your medical record shows you are due for the following:     Diabetic A1C follow up   Diabetic Eye Exam   Diabetic Foot Exam   Diabetes Care: Microalb/Creat Ratio      Please call [phone] to schedule your appointment or schedule online via [employer].     If you changed to a new provider at another facility, please notify the clinic to update your records.    If you had any recent testing at another facility, please have your results faxed to our office at [phone].     Thank you and have a great day!